# Patient Record
Sex: MALE | Race: WHITE | NOT HISPANIC OR LATINO | Employment: OTHER | URBAN - METROPOLITAN AREA
[De-identification: names, ages, dates, MRNs, and addresses within clinical notes are randomized per-mention and may not be internally consistent; named-entity substitution may affect disease eponyms.]

---

## 2017-10-06 ENCOUNTER — GENERIC CONVERSION - ENCOUNTER (OUTPATIENT)
Dept: OTHER | Facility: OTHER | Age: 52
End: 2017-10-06

## 2017-10-06 DIAGNOSIS — Z00.00 ENCOUNTER FOR GENERAL ADULT MEDICAL EXAMINATION WITHOUT ABNORMAL FINDINGS: ICD-10-CM

## 2017-10-19 ENCOUNTER — GENERIC CONVERSION - ENCOUNTER (OUTPATIENT)
Dept: OTHER | Facility: OTHER | Age: 52
End: 2017-10-19

## 2017-10-24 RX ORDER — OXYCODONE AND ACETAMINOPHEN 10; 325 MG/1; MG/1
1 TABLET ORAL EVERY 4 HOURS PRN
COMMUNITY

## 2017-10-24 NOTE — PRE-PROCEDURE INSTRUCTIONS
No outpatient prescriptions have been marked as taking for the 10/25/17 encounter [facility] Encounter)

## 2017-10-25 ENCOUNTER — ANESTHESIA (OUTPATIENT)
Dept: PERIOP | Facility: HOSPITAL | Age: 52
End: 2017-10-25
Payer: COMMERCIAL

## 2017-10-25 ENCOUNTER — HOSPITAL ENCOUNTER (OUTPATIENT)
Facility: HOSPITAL | Age: 52
Setting detail: OUTPATIENT SURGERY
Discharge: HOME | End: 2017-10-25
Attending: SURGERY | Admitting: SURGERY
Payer: COMMERCIAL

## 2017-10-25 ENCOUNTER — ANESTHESIA EVENT (OUTPATIENT)
Dept: PERIOP | Facility: HOSPITAL | Age: 52
End: 2017-10-25
Payer: COMMERCIAL

## 2017-10-25 VITALS
BODY MASS INDEX: 25.78 KG/M2 | TEMPERATURE: 98 F | RESPIRATION RATE: 17 BRPM | HEART RATE: 59 BPM | OXYGEN SATURATION: 100 % | WEIGHT: 151 LBS | HEIGHT: 64 IN | SYSTOLIC BLOOD PRESSURE: 132 MMHG | DIASTOLIC BLOOD PRESSURE: 71 MMHG

## 2017-10-25 DIAGNOSIS — M79.9 SOFT TISSUE DISORDER: ICD-10-CM

## 2017-10-25 PROBLEM — D17.1 SUBMUSCULAR LIPOMA OF CHEST: Chronic | Status: ACTIVE | Noted: 2017-10-25

## 2017-10-25 PROCEDURE — 88304 TISSUE EXAM BY PATHOLOGIST: CPT | Performed by: SURGERY

## 2017-10-25 RX ORDER — PROPOFOL 10 MG/ML
INJECTION, EMULSION INTRAVENOUS CONTINUOUS PRN
Status: DISCONTINUED | OUTPATIENT
Start: 2017-10-25 | End: 2017-10-25

## 2017-10-25 RX ORDER — CEFAZOLIN SODIUM 1 G/3ML
INJECTION, POWDER, FOR SOLUTION INTRAMUSCULAR; INTRAVENOUS AS NEEDED
Status: DISCONTINUED | OUTPATIENT
Start: 2017-10-25 | End: 2017-10-25 | Stop reason: SURG

## 2017-10-25 RX ORDER — PROMETHAZINE HYDROCHLORIDE 25 MG/ML
25 INJECTION, SOLUTION INTRAMUSCULAR; INTRAVENOUS ONCE AS NEEDED
Status: DISCONTINUED | OUTPATIENT
Start: 2017-10-25 | End: 2017-10-25 | Stop reason: HOSPADM

## 2017-10-25 RX ORDER — FENTANYL CITRATE/PF 50 MCG/ML
50 SYRINGE (ML) INJECTION
Status: DISCONTINUED | OUTPATIENT
Start: 2017-10-25 | End: 2017-10-25

## 2017-10-25 RX ORDER — ACETAMINOPHEN AND CODEINE PHOSPHATE 300; 30 MG/1; MG/1
1 TABLET ORAL EVERY 4 HOURS PRN
Qty: 20 TABLET | Refills: 0 | Status: SHIPPED | OUTPATIENT
Start: 2017-10-25 | End: 2017-11-04

## 2017-10-25 RX ORDER — MAGNESIUM HYDROXIDE 1200 MG/15ML
LIQUID ORAL AS NEEDED
Status: DISCONTINUED | OUTPATIENT
Start: 2017-10-25 | End: 2017-10-25 | Stop reason: HOSPADM

## 2017-10-25 RX ORDER — ONDANSETRON 2 MG/ML
4 INJECTION INTRAMUSCULAR; INTRAVENOUS ONCE AS NEEDED
Status: DISCONTINUED | OUTPATIENT
Start: 2017-10-25 | End: 2017-10-25

## 2017-10-25 RX ORDER — ONDANSETRON 2 MG/ML
4 INJECTION INTRAMUSCULAR; INTRAVENOUS EVERY 6 HOURS PRN
Status: DISCONTINUED | OUTPATIENT
Start: 2017-10-25 | End: 2017-10-25 | Stop reason: HOSPADM

## 2017-10-25 RX ORDER — LIDOCAINE HYDROCHLORIDE 10 MG/ML
INJECTION, SOLUTION INFILTRATION; PERINEURAL AS NEEDED
Status: DISCONTINUED | OUTPATIENT
Start: 2017-10-25 | End: 2017-10-25 | Stop reason: HOSPADM

## 2017-10-25 RX ORDER — MORPHINE SULFATE 2 MG/ML
2 INJECTION, SOLUTION INTRAMUSCULAR; INTRAVENOUS EVERY 2 HOUR PRN
Status: DISCONTINUED | OUTPATIENT
Start: 2017-10-25 | End: 2017-10-25 | Stop reason: HOSPADM

## 2017-10-25 RX ORDER — FENTANYL CITRATE 50 UG/ML
INJECTION, SOLUTION INTRAMUSCULAR; INTRAVENOUS AS NEEDED
Status: DISCONTINUED | OUTPATIENT
Start: 2017-10-25 | End: 2017-10-25 | Stop reason: SURG

## 2017-10-25 RX ORDER — PROPOFOL 10 MG/ML
INJECTION, EMULSION INTRAVENOUS AS NEEDED
Status: DISCONTINUED | OUTPATIENT
Start: 2017-10-25 | End: 2017-10-25 | Stop reason: SURG

## 2017-10-25 RX ORDER — MIDAZOLAM HYDROCHLORIDE 1 MG/ML
INJECTION INTRAMUSCULAR; INTRAVENOUS AS NEEDED
Status: DISCONTINUED | OUTPATIENT
Start: 2017-10-25 | End: 2017-10-25 | Stop reason: SURG

## 2017-10-25 RX ORDER — LIDOCAINE HYDROCHLORIDE 10 MG/ML
INJECTION, SOLUTION INFILTRATION; PERINEURAL AS NEEDED
Status: DISCONTINUED | OUTPATIENT
Start: 2017-10-25 | End: 2017-10-25 | Stop reason: SURG

## 2017-10-25 RX ORDER — MEPERIDINE HYDROCHLORIDE 25 MG/ML
12.5 INJECTION INTRAMUSCULAR; INTRAVENOUS; SUBCUTANEOUS ONCE AS NEEDED
Status: DISCONTINUED | OUTPATIENT
Start: 2017-10-25 | End: 2017-10-25

## 2017-10-25 RX ORDER — SODIUM CHLORIDE, SODIUM LACTATE, POTASSIUM CHLORIDE, CALCIUM CHLORIDE 600; 310; 30; 20 MG/100ML; MG/100ML; MG/100ML; MG/100ML
100 INJECTION, SOLUTION INTRAVENOUS CONTINUOUS
Status: DISCONTINUED | OUTPATIENT
Start: 2017-10-25 | End: 2017-10-25 | Stop reason: HOSPADM

## 2017-10-25 RX ADMIN — FENTANYL CITRATE 50 MCG: 50 INJECTION, SOLUTION INTRAMUSCULAR; INTRAVENOUS at 12:06

## 2017-10-25 RX ADMIN — PROPOFOL 100 MCG/KG/MIN: 10 INJECTION, EMULSION INTRAVENOUS at 12:06

## 2017-10-25 RX ADMIN — MIDAZOLAM HYDROCHLORIDE 2 MG: 1 INJECTION, SOLUTION INTRAMUSCULAR; INTRAVENOUS at 12:00

## 2017-10-25 RX ADMIN — SODIUM CHLORIDE, SODIUM LACTATE, POTASSIUM CHLORIDE, AND CALCIUM CHLORIDE: .6; .31; .03; .02 INJECTION, SOLUTION INTRAVENOUS at 11:53

## 2017-10-25 RX ADMIN — LIDOCAINE HYDROCHLORIDE 50 MG: 10 INJECTION, SOLUTION INFILTRATION; PERINEURAL at 12:06

## 2017-10-25 RX ADMIN — PROPOFOL 20 MG: 10 INJECTION, EMULSION INTRAVENOUS at 12:12

## 2017-10-25 RX ADMIN — PROPOFOL 20 MG: 10 INJECTION, EMULSION INTRAVENOUS at 12:09

## 2017-10-25 RX ADMIN — PROPOFOL 100 MG: 10 INJECTION, EMULSION INTRAVENOUS at 12:06

## 2017-10-25 RX ADMIN — CEFAZOLIN 2000 MG: 1 INJECTION, POWDER, FOR SOLUTION INTRAVENOUS at 12:10

## 2017-10-25 NOTE — ANESTHESIA PREPROCEDURE EVALUATION
Review of Systems/Medical History  Patient summary reviewed        Cardiovascular  Negative cardio ROS    Pulmonary  Smoker cigarette smoker , Tobacco cessation counseling given, ,        GI/Hepatic  Negative GI/hepatic ROS          Negative  ROS        Endo/Other  Negative endo/other ROS      GYN  Negative gynecology ROS          Hematology  Negative hematology ROS      Musculoskeletal  Negative musculoskeletal ROS        Neurology  Negative neurology ROS      Psychology   Negative psychology ROS            Physical Exam    Airway    Mallampati score: II  TM Distance: >3 FB  Neck ROM: full     Dental       Cardiovascular  Comment: Negative ROS, Rhythm: regular, Rate: normal, Cardiovascular exam normal    Pulmonary  Pulmonary exam normal Breath sounds clear to auscultation,     Other Findings        Anesthesia Plan  ASA Score- 2       Anesthesia Type- IV sedation with anesthesia with ASA Monitors  Additional Monitors:   Airway Plan:           Induction- intravenous  Informed Consent- Anesthetic plan and risks discussed with patient

## 2017-10-25 NOTE — OP NOTE
OPERATIVE REPORT  PATIENT NAME: Korina Watkins    :  1965  MRN: [id number]  Pt Location: [facility] OR ROOM 04    SURGERY DATE: 10/25/2017    Surgeon(s) and Role:     * Jesus Sanders MD - Primary     * Yuli Rebolledo PA-C    Preop Diagnosis:  Soft tissue disorder [M79 9]    Post-Op Diagnosis Codes: * Soft tissue disorder [M79 9]    Procedure(s) (LRB):  RIGHT BACK MASS EXCISION (N/A)    Specimen(s):  ID Type Source Tests Collected by Time Destination   1 : RIGHT BACK LIPOMA Tissue Soft Tissue, Lipoma TISSUE EXAM Jesus Sanders MD 10/25/2017 1219        Estimated Blood Loss:   Minimal    Drains:       Anesthesia Type:   IV Sedation with Anesthesia    Operative Indications:  Soft tissue disorder [M79 9]  [de-identified] year male who noted a mass in the right posterior chest for approximately 2 years, increasing in size and constant discomfort and pain with movements  The patient was advised to undergo excision of the soft tissue mass  Operative Findings:  The patient had an lipoma measuring 7 5 x 5 5 cm and was 2 5 cm thick  The lipoma was within the latissimus dorsi muscle  Complications:   None    Procedure and Technique:  The patient was identified he was placed on the left leg decubitus in the operating table  After adequate IV sedation the right chest was prepped and draped in a sterile usual fashion with ChloraPrep  Time-out was called the patient was identified as well as surgical site  1% lidocaine was infiltrated over the mass  A transverse incision was made with a scalpel, taken down through the subcutaneous tissue with cautery  The lipoma was readily identified and dissected bluntly using finger dissection and hemostat  Once the soft tissue mass was completely excised, it was sent to pathology  The muscle was approximated with 3 0 Vicryl in a interrupted fashion    The subcutaneous tissue was approximated with a 3 0 Vicryl in an interrupted fashion and the skin was closed with 4 O Vicryl in a continuous subcuticular fashion  Sterile dressings were applied  At the end of the case instrument, needles, and sponges counts were correct  The patient tolerated the procedure well     I was present for the entire procedure and A qualified resident physician was not available    Patient Disposition:  PACU     SIGNATURE: Dawit Ocampo MD  DATE: October 25, 2017  TIME: 12:35 PM

## 2017-10-25 NOTE — ANESTHESIA POSTPROCEDURE EVALUATION
Post-Op Assessment Note      CV Status:  Stable    Mental Status:  Somnolent    Hydration Status:  Euvolemic    PONV Controlled:  Controlled    Airway Patency:  Patent    Post Op Vitals Reviewed: Yes          Staff: CRNA           /55 (10/25/17 1234)    Temp 97 9 °F (36 6 °C) (10/25/17 1234)    Pulse 74 (10/25/17 1234)   Resp 18 (10/25/17 1234)    SpO2 96 % (10/25/17 1234)

## 2017-10-25 NOTE — DISCHARGE INSTRUCTIONS
Lipoma   GENERAL INFORMATION:   What is a lipoma? A lipoma is a lump made up of fat cells  It is most often found just under your skin on your shoulders, back, or neck, but can be found in other areas of your body  Multiple lipomas found in different areas of your body is called lipomatosis  Lipomas normally grow very slowly and rarely turn into cancer  What increases my risk of a lipoma? The exact cause of a lipoma is not known  Single lipomas are more common in women  Men are more likely to have lipomatosis  The following may increase your risk of getting a lipoma or lipomatosis:  · Family history of lipoma     · Certain medical conditions, such as liver disease, or problems controlling your blood sugar    · Obesity    · A blunt blow or injury to your body  What are the signs and symptoms of a lipoma? Lipomas can occur anywhere in your body and cause signs and symptoms depending on where they occur  Most often, you have a soft, round, movable lump just under your skin  The lump may be painful, but this is not common  How is a lipoma diagnosed? Your caregiver will examine you  He may feel the area around your lipoma  Tell your caregiver about any signs and symptoms you have  You may need any of the following:  · Ultrasound: An ultrasound uses sound waves to show pictures on a monitor  An ultrasound may be done to look at your lipoma and surrounding tissue  · X-ray:  An x-ray may be taken to check for lipomas in your muscles and other areas of your body  · CT scan: This test is also called a CAT scan  An x-ray machine uses a computer to take pictures of your lipoma and nearby tissue  You may be given a dye before the pictures are taken to help caregivers see the pictures better  Tell the caregiver if you have ever had an allergic reaction to contrast dye  · MRI:  This scan uses powerful magnets and a computer to take pictures of your lipoma and nearby tissue   You may be given dye to help the pictures show up better  Tell the caregiver if you have ever had an allergic reaction to contrast dye  Do not enter the MRI room with anything metal  Metal can cause serious injury  Tell the caregiver if you have any metal in or on your body  · Biopsy:  During this procedure, a small amount of tissue is removed from your lipoma  The tissue sample will be sent to a lab for tests  How is a lipoma treated? You may need treatment if your lipoma grows and causes symptoms such as pain  You may choose to have your lipoma treated if you do not like how it looks  Treatment may include any of the following:  · Steroid injections: This is given as a shot into your lipoma to help it shrink  · Liposuction:  During this procedure, your caregiver will use a syringe with a needle to remove your lipoma  He may also insert a scope and tools through a small incision  A scope is a thin, flexible tube with a light and tiny camera on the end  This will help him see and remove your lipoma  · Surgical removal:  Your caregiver will remove your lipoma through an incision in your skin  Anesthesia medicine will be used to numb the surgery site  A drain may be put into your skin to remove extra blood or fluid from your surgery area  The incision may be closed with stitches and a bandage may cover your wound  What are the risks of a lipoma? · Treatment for your lipoma may cause pain, swelling, and bruising  Liposuction may cause dimpling or color changes in your skin  Surgical removal of your lipoma may cause a scar  With surgery, a seroma (pocket of fluid) may form in nearby tissue or organs  Your nerves may be damaged and cause numbness or tingling in your skin  Your muscles may also be injured, and you may bleed more than expected or get an infection  You may need to have more than one treatment for your lipoma  Even with treatment, your lipoma may not be completely removed, and it may increase in size again      · Without treatment, your lipoma may become large and painful  A lipoma in your bowel may block bowel movements  It may also injure nearby tissue causing a hemorrhage (heavy bleeding)  Lipomas in your neck and throat may cause you to choke, have trouble breathing, and be life-threatening  When should I contact my caregiver? Contact your caregiver if:  · You have blood in your bowel movement  · Your lipoma increases in size  · Your lipoma is painful or you have pain in the area of your lipoma  · You have questions or concerns about your condition or care  When should I seek immediate care? Seek care immediately or call 911 if:  · You feel a lump in your throat or have trouble swallowing  · You suddenly have trouble breathing  CARE AGREEMENT:   You have the right to help plan your care  Learn about your health condition and how it may be treated  Discuss treatment options with your caregivers to decide what care you want to receive  You always have the right to refuse treatment  The above information is an  only  It is not intended as medical advice for individual conditions or treatments  Talk to your doctor, nurse or pharmacist before following any medical regimen to see if it is safe and effective for you  © 2014 [address] is for End User's use only and may not be sold, redistributed or otherwise used for commercial purposes  All illustrations and images included in CareNotes® are the copyrighted property of [employer]  or Shashank Ferguson  Lipoma   WHAT YOU SHOULD KNOW:   A lipoma is a lump made up of fat cells  It is most often found just under your skin on your shoulders, back, or neck, but can be found in other areas of your body  Multiple lipomas found in different areas of your body is called lipomatosis  Lipomas normally grow very slowly and rarely turn into cancer     AFTER YOU LEAVE:   Follow up with your primary healthcare provider as directed: If you had your lipoma removed, you may need to return to have your wound checked or stitches removed  Write down your questions so you remember to ask them during your visits  Contact your primary healthcare provider if:   · You have blood in your bowel movement  · Your lipoma increases in size  · Your lipoma is painful or you have pain in the area of your lipoma  · You have questions or concerns about your condition or care  Seek care immediately or call 911 if:   · You feel a lump in your throat or have trouble swallowing  · You suddenly have trouble breathing  © 2014 [address] is for End User's use only and may not be sold, redistributed or otherwise used for commercial purposes  All illustrations and images included in CareNotes® are the copyrighted property of [employer]  or Shashank Ferguson  The above information is an  only  It is not intended as medical advice for individual conditions or treatments  Talk to your doctor, nurse or pharmacist before following any medical regimen to see if it is safe and effective for you    No diet restriction for this surgery  May shower every day  Remove dressings in 3 days  Remove strips in 7 days  Call office to make an appointment in 2 weeks  Call office with any issues regarding the surgery  No driving, heavy lifting or strenuous exercise for one week  Resume home medications  Apply ice to the incisions  May take Percocet, regular Tylenol or ibuprofen for pain

## 2018-01-22 VITALS
SYSTOLIC BLOOD PRESSURE: 118 MMHG | HEIGHT: 63 IN | DIASTOLIC BLOOD PRESSURE: 90 MMHG | TEMPERATURE: 97.2 F | HEART RATE: 87 BPM | WEIGHT: 150 LBS | BODY MASS INDEX: 26.58 KG/M2 | OXYGEN SATURATION: 97 %

## 2018-01-22 VITALS — WEIGHT: 156 LBS | TEMPERATURE: 97.8 F | SYSTOLIC BLOOD PRESSURE: 110 MMHG | DIASTOLIC BLOOD PRESSURE: 70 MMHG

## 2018-02-05 ENCOUNTER — TELEPHONE (OUTPATIENT)
Dept: FAMILY MEDICINE CLINIC | Facility: CLINIC | Age: 53
End: 2018-02-05

## 2018-02-05 NOTE — TELEPHONE ENCOUNTER
Pt was seen for that in early October, why haven't we heard from him until now, also he needs to have his labs done and needs to be seen

## 2018-02-13 ENCOUNTER — OFFICE VISIT (OUTPATIENT)
Dept: FAMILY MEDICINE CLINIC | Facility: CLINIC | Age: 53
End: 2018-02-13
Payer: COMMERCIAL

## 2018-02-13 VITALS
DIASTOLIC BLOOD PRESSURE: 90 MMHG | WEIGHT: 155 LBS | BODY MASS INDEX: 26.46 KG/M2 | SYSTOLIC BLOOD PRESSURE: 124 MMHG | HEART RATE: 81 BPM | HEIGHT: 64 IN | TEMPERATURE: 96.4 F | OXYGEN SATURATION: 99 %

## 2018-02-13 DIAGNOSIS — R03.0 ELEVATED BLOOD PRESSURE READING: ICD-10-CM

## 2018-02-13 DIAGNOSIS — R21 RASH: Primary | ICD-10-CM

## 2018-02-13 DIAGNOSIS — Z12.5 SCREENING FOR PROSTATE CANCER: ICD-10-CM

## 2018-02-13 PROCEDURE — 99214 OFFICE O/P EST MOD 30 MIN: CPT | Performed by: FAMILY MEDICINE

## 2018-02-13 RX ORDER — DIAZEPAM 5 MG/1
TABLET ORAL
Refills: 0 | COMMUNITY
Start: 2018-01-24 | End: 2018-12-07 | Stop reason: SDUPTHER

## 2018-02-13 NOTE — PROGRESS NOTES
Assessment/Plan:    No problem-specific Assessment & Plan notes found for this encounter  Diagnoses and all orders for this visit:    Rash  -     CBC and differential; Future  -     Comprehensive metabolic panel; Future  -     TSH, 3rd generation with T4 reflex; Future  -     nystatin-triamcinolone (MYCOLOG-II) cream; Apply topically 2 (two) times a day for 10 days    Elevated blood pressure reading  -     CBC and differential; Future  -     Comprehensive metabolic panel; Future  -     Lipid panel; Future  -     TSH, 3rd generation with T4 reflex; Future    Screening for prostate cancer  -     PSA; Future    Other orders  -     traMADol-acetaminophen (ULTRACET) 37 5-325 mg per tablet; TAKE 1 - 2 TABLET BY MOUTH EVERY FOUR TO SIX HOURS AS NEEDED FOR PAIN  -     diazepam (VALIUM) 5 mg tablet; TAKE 1-2 TABLET EVERY SIX HOURS          Subjective:      Patient ID: Myah Beltran is a 50 y o  male  Pt states the the nystatin was helping but he ran out of medication, pt failed to have his labs done and failed to follow up      Rash   This is a chronic problem  The current episode started more than 1 month ago  The problem has been gradually improving since onset  Location: "jock area" The rash is characterized by redness  He was exposed to nothing  Pertinent negatives include no fatigue, fever or shortness of breath  Treatments tried: nystatin  The treatment provided moderate relief  There is no history of allergies  The following portions of the patient's history were reviewed and updated as appropriate: allergies, current medications, past family history, past medical history, past social history, past surgical history and problem list     Review of Systems   Constitutional: Negative for chills, fatigue and fever  Respiratory: Negative for shortness of breath and wheezing  Skin: Positive for rash           Objective:    Vitals:    02/13/18 1342   BP: 124/90   Pulse: 81   Temp: (!) 96 4 °F (35 8 °C) SpO2: 99%        Physical Exam   Constitutional: He is oriented to person, place, and time  He appears well-developed and well-nourished  No distress  HENT:   Head: Normocephalic and atraumatic  Neck: Normal range of motion  Neck supple  Cardiovascular: Normal rate, regular rhythm and normal heart sounds  No murmur heard  Pulmonary/Chest: Effort normal and breath sounds normal  No stridor  No respiratory distress  He has no wheezes  He has no rales  Lymphadenopathy:     He has no cervical adenopathy  Neurological: He is alert and oriented to person, place, and time  Skin: Skin is warm and dry  He is not diaphoretic  Psychiatric: He has a normal mood and affect   His behavior is normal  Judgment and thought content normal

## 2018-02-21 ENCOUNTER — APPOINTMENT (OUTPATIENT)
Dept: LAB | Facility: CLINIC | Age: 53
End: 2018-02-21
Payer: COMMERCIAL

## 2018-02-21 DIAGNOSIS — R03.0 ELEVATED BLOOD PRESSURE READING: ICD-10-CM

## 2018-02-21 DIAGNOSIS — R21 RASH: ICD-10-CM

## 2018-02-21 DIAGNOSIS — Z12.5 SCREENING FOR PROSTATE CANCER: ICD-10-CM

## 2018-02-21 LAB
ALBUMIN SERPL BCP-MCNC: 3.8 G/DL (ref 3.5–5)
ALP SERPL-CCNC: 58 U/L (ref 46–116)
ALT SERPL W P-5'-P-CCNC: 25 U/L (ref 12–78)
ANION GAP SERPL CALCULATED.3IONS-SCNC: 9 MMOL/L (ref 4–13)
AST SERPL W P-5'-P-CCNC: 13 U/L (ref 5–45)
BASOPHILS # BLD AUTO: 0.05 THOUSANDS/ΜL (ref 0–0.1)
BASOPHILS NFR BLD AUTO: 1 % (ref 0–1)
BILIRUB SERPL-MCNC: 0.77 MG/DL (ref 0.2–1)
BUN SERPL-MCNC: 14 MG/DL (ref 5–25)
CALCIUM SERPL-MCNC: 8.5 MG/DL (ref 8.3–10.1)
CHLORIDE SERPL-SCNC: 106 MMOL/L (ref 100–108)
CHOLEST SERPL-MCNC: 152 MG/DL (ref 50–200)
CO2 SERPL-SCNC: 24 MMOL/L (ref 21–32)
CREAT SERPL-MCNC: 0.72 MG/DL (ref 0.6–1.3)
EOSINOPHIL # BLD AUTO: 0.35 THOUSAND/ΜL (ref 0–0.61)
EOSINOPHIL NFR BLD AUTO: 6 % (ref 0–6)
ERYTHROCYTE [DISTWIDTH] IN BLOOD BY AUTOMATED COUNT: 12.8 % (ref 11.6–15.1)
GFR SERPL CREATININE-BSD FRML MDRD: 109 ML/MIN/1.73SQ M
GLUCOSE P FAST SERPL-MCNC: 84 MG/DL (ref 65–99)
HCT VFR BLD AUTO: 39 % (ref 36.5–49.3)
HDLC SERPL-MCNC: 77 MG/DL (ref 40–60)
HGB BLD-MCNC: 13.7 G/DL (ref 12–17)
LDLC SERPL CALC-MCNC: 55 MG/DL (ref 0–100)
LYMPHOCYTES # BLD AUTO: 2.22 THOUSANDS/ΜL (ref 0.6–4.47)
LYMPHOCYTES NFR BLD AUTO: 36 % (ref 14–44)
MCH RBC QN AUTO: 31.1 PG (ref 26.8–34.3)
MCHC RBC AUTO-ENTMCNC: 35.1 G/DL (ref 31.4–37.4)
MCV RBC AUTO: 88 FL (ref 82–98)
MONOCYTES # BLD AUTO: 0.56 THOUSAND/ΜL (ref 0.17–1.22)
MONOCYTES NFR BLD AUTO: 9 % (ref 4–12)
NEUTROPHILS # BLD AUTO: 3.02 THOUSANDS/ΜL (ref 1.85–7.62)
NEUTS SEG NFR BLD AUTO: 48 % (ref 43–75)
NRBC BLD AUTO-RTO: 0 /100 WBCS
PLATELET # BLD AUTO: 291 THOUSANDS/UL (ref 149–390)
PMV BLD AUTO: 10.1 FL (ref 8.9–12.7)
POTASSIUM SERPL-SCNC: 4.1 MMOL/L (ref 3.5–5.3)
PROT SERPL-MCNC: 6.9 G/DL (ref 6.4–8.2)
PSA SERPL-MCNC: 0.8 NG/ML (ref 0–4)
RBC # BLD AUTO: 4.41 MILLION/UL (ref 3.88–5.62)
SODIUM SERPL-SCNC: 139 MMOL/L (ref 136–145)
TRIGL SERPL-MCNC: 102 MG/DL
TSH SERPL DL<=0.05 MIU/L-ACNC: 1.27 UIU/ML (ref 0.36–3.74)
WBC # BLD AUTO: 6.22 THOUSAND/UL (ref 4.31–10.16)

## 2018-02-21 PROCEDURE — 84443 ASSAY THYROID STIM HORMONE: CPT

## 2018-02-21 PROCEDURE — 80053 COMPREHEN METABOLIC PANEL: CPT

## 2018-02-21 PROCEDURE — G0103 PSA SCREENING: HCPCS

## 2018-02-21 PROCEDURE — 36415 COLL VENOUS BLD VENIPUNCTURE: CPT

## 2018-02-21 PROCEDURE — 85025 COMPLETE CBC W/AUTO DIFF WBC: CPT

## 2018-02-21 PROCEDURE — 80061 LIPID PANEL: CPT

## 2018-02-27 ENCOUNTER — OFFICE VISIT (OUTPATIENT)
Dept: FAMILY MEDICINE CLINIC | Facility: CLINIC | Age: 53
End: 2018-02-27

## 2018-02-27 VITALS
HEIGHT: 64 IN | DIASTOLIC BLOOD PRESSURE: 78 MMHG | SYSTOLIC BLOOD PRESSURE: 116 MMHG | WEIGHT: 155 LBS | TEMPERATURE: 97.9 F | HEART RATE: 69 BPM | BODY MASS INDEX: 26.46 KG/M2 | OXYGEN SATURATION: 99 %

## 2018-02-27 DIAGNOSIS — R21 RASH: ICD-10-CM

## 2018-02-27 DIAGNOSIS — R03.0 ELEVATED BLOOD PRESSURE READING: Primary | ICD-10-CM

## 2018-02-27 DIAGNOSIS — Z12.5 SCREENING FOR PROSTATE CANCER: ICD-10-CM

## 2018-02-27 DIAGNOSIS — E78.89 ELEVATED HDL: ICD-10-CM

## 2018-02-27 DIAGNOSIS — B00.1 COLD SORE: ICD-10-CM

## 2018-02-27 PROCEDURE — 99214 OFFICE O/P EST MOD 30 MIN: CPT | Performed by: FAMILY MEDICINE

## 2018-02-27 RX ORDER — VALACYCLOVIR HYDROCHLORIDE 1 G/1
1000 TABLET, FILM COATED ORAL 2 TIMES DAILY
Qty: 10 TABLET | Refills: 0 | Status: SHIPPED | OUTPATIENT
Start: 2018-02-27 | End: 2022-02-23

## 2018-02-27 NOTE — PROGRESS NOTES
Assessment/Plan:    No problem-specific Assessment & Plan notes found for this encounter  Diagnoses and all orders for this visit:    Elevated blood pressure reading  -     CBC and differential; Future  -     Comprehensive metabolic panel; Future  -     Lipid panel; Future  -     TSH, 3rd generation with T4 reflex; Future    Elevated HDL  Comments:  pt has a very good lipid profile, continue to watch diet and exercise  Orders:  -     CBC and differential; Future  -     Comprehensive metabolic panel; Future  -     Lipid panel; Future  -     TSH, 3rd generation with T4 reflex; Future    Rash  Comments:  resolved    Cold sore  -     valACYclovir (VALTREX) 1,000 mg tablet; Take 1 tablet (1,000 mg total) by mouth 2 (two) times a day for 5 days    Screening for prostate cancer  -     PSA; Future          Subjective:      Patient ID: Makenzie Nunez is a 50 y o  male  Follow up for elevated Bp, pt quit smoking totally at this point, pt is not checking Bps at home, follow up for rash which is improved with the since using the steroid cream, pt complains of cold sore today which started recently        The following portions of the patient's history were reviewed and updated as appropriate: allergies, current medications, past family history, past medical history, past social history, past surgical history and problem list     Review of Systems   Cardiovascular: Negative for chest pain and palpitations  Gastrointestinal: Negative for abdominal pain, nausea and vomiting  Musculoskeletal: Negative for myalgias  Objective:      /78   Pulse 69   Temp 97 9 °F (36 6 °C)   Ht 5' 4" (1 626 m)   Wt 70 3 kg (155 lb)   SpO2 99%   BMI 26 61 kg/m²          Physical Exam   Constitutional: He is oriented to person, place, and time  He appears well-developed and well-nourished  No distress  HENT:   Head: Normocephalic and atraumatic  Eyes: No scleral icterus  Neck: Normal range of motion  Neck supple  Cardiovascular: Normal rate, regular rhythm and normal heart sounds  Exam reveals no gallop and no friction rub  No murmur heard  Pulmonary/Chest: Effort normal and breath sounds normal  No stridor  No respiratory distress  He has no wheezes  He has no rales  Abdominal: Soft  Bowel sounds are normal  He exhibits no distension and no mass  There is no tenderness  There is no rebound and no guarding  Lymphadenopathy:     He has no cervical adenopathy  Neurological: He is alert and oriented to person, place, and time  Skin: Skin is warm and dry  He is not diaphoretic  Psychiatric: He has a normal mood and affect  His behavior is normal  Judgment and thought content normal    Nursing note and vitals reviewed

## 2018-05-24 DIAGNOSIS — R21 RASH: ICD-10-CM

## 2018-06-19 DIAGNOSIS — R21 RASH: ICD-10-CM

## 2018-07-03 ENCOUNTER — OFFICE VISIT (OUTPATIENT)
Dept: FAMILY MEDICINE CLINIC | Facility: CLINIC | Age: 53
End: 2018-07-03
Payer: COMMERCIAL

## 2018-07-03 VITALS
BODY MASS INDEX: 26.63 KG/M2 | WEIGHT: 156 LBS | HEIGHT: 64 IN | SYSTOLIC BLOOD PRESSURE: 132 MMHG | DIASTOLIC BLOOD PRESSURE: 98 MMHG | TEMPERATURE: 97.1 F

## 2018-07-03 DIAGNOSIS — G89.29 CHRONIC BILATERAL LOW BACK PAIN WITH BILATERAL SCIATICA: ICD-10-CM

## 2018-07-03 DIAGNOSIS — Z12.11 SCREENING FOR COLON CANCER: ICD-10-CM

## 2018-07-03 DIAGNOSIS — M54.41 CHRONIC BILATERAL LOW BACK PAIN WITH BILATERAL SCIATICA: ICD-10-CM

## 2018-07-03 DIAGNOSIS — H92.01 EAR PAIN, RIGHT: Primary | ICD-10-CM

## 2018-07-03 DIAGNOSIS — F41.9 ANXIETY: ICD-10-CM

## 2018-07-03 DIAGNOSIS — M54.42 CHRONIC BILATERAL LOW BACK PAIN WITH BILATERAL SCIATICA: ICD-10-CM

## 2018-07-03 DIAGNOSIS — H61.23 BILATERAL HEARING LOSS DUE TO CERUMEN IMPACTION: ICD-10-CM

## 2018-07-03 PROBLEM — M54.9 BACK PAIN: Status: ACTIVE | Noted: 2018-07-03

## 2018-07-03 PROBLEM — R21 SKIN RASH: Status: ACTIVE | Noted: 2017-10-06

## 2018-07-03 PROBLEM — L72.9 CYST OF SKIN: Status: ACTIVE | Noted: 2017-10-06

## 2018-07-03 PROBLEM — M79.89 SOFT TISSUE MASS: Status: ACTIVE | Noted: 2017-10-19

## 2018-07-03 PROCEDURE — 99214 OFFICE O/P EST MOD 30 MIN: CPT | Performed by: NURSE PRACTITIONER

## 2018-07-03 RX ORDER — CYCLOBENZAPRINE HCL 10 MG
10 TABLET ORAL 3 TIMES DAILY PRN
Qty: 30 TABLET | Refills: 2 | Status: SHIPPED | OUTPATIENT
Start: 2018-07-03

## 2018-07-03 RX ORDER — NYSTATIN 100000 U/G
CREAM TOPICAL
Refills: 1 | COMMUNITY
Start: 2018-05-24 | End: 2021-01-13 | Stop reason: SDUPTHER

## 2018-07-03 RX ORDER — PREDNISONE 20 MG/1
TABLET ORAL
Qty: 16 TABLET | Refills: 0 | Status: SHIPPED | OUTPATIENT
Start: 2018-07-03

## 2018-07-03 RX ORDER — DIAZEPAM 5 MG/1
5 TABLET ORAL EVERY 6 HOURS PRN
Qty: 30 TABLET | Refills: 0 | Status: SHIPPED | OUTPATIENT
Start: 2018-07-03 | End: 2018-11-09 | Stop reason: SDUPTHER

## 2018-07-03 NOTE — PROGRESS NOTES
Assessment/Plan:    No problem-specific Assessment & Plan notes found for this encounter  Diagnoses and all orders for this visit:    Ear pain, right  Comments:  bilateral cerumen impaction noted- pt reports bilateral hearing loss    Screening for colon cancer  Comments:  Referred to GI  Orders:  -     Ambulatory referral to Gastroenterology; Future    Anxiety  Comments:  Rx for Valium provided  Orders:  -     diazepam (VALIUM) 5 mg tablet; Take 1 tablet (5 mg total) by mouth every 6 (six) hours as needed for anxiety    Bilateral hearing loss due to cerumen impaction  Comments:  Pt will return 3 days for cerumen removal    Chronic bilateral low back pain with bilateral sciatica  Comments:  Rx for Prednisone and Flexeril provided  pt has hx of lumbar surgery 2 yrs ago  Orders:  -     predniSONE 20 mg tablet; Please take 3 -20 mg tabs X3 days , Please take 2 - 20mg tabs X 3 days, Please take 1-20 mg tab X1 days  -     cyclobenzaprine (FLEXERIL) 10 mg tablet; Take 1 tablet (10 mg total) by mouth 3 (three) times a day as needed for muscle spasms    Other orders  -     nystatin (MYCOSTATIN) cream; APPLY TOPICALLY 2 (TWO) TIMES A DAY FOR 10 DAYS          Subjective:      Patient ID: Parul Del Rio is a 50 y o  male here for ear pain  HPI    The following portions of the patient's history were reviewed and updated as appropriate:   He  has a past medical history of Back pain  He   Patient Active Problem List    Diagnosis Date Noted    Ear pain, right 07/03/2018    Back pain 07/03/2018    Submuscular lipoma of chest 10/25/2017    Soft tissue mass 10/19/2017    Cyst of skin 10/06/2017    Skin rash 10/06/2017     He  has a past surgical history that includes Back surgery (09/2017); Neck surgery; Hernia repair; and Mass excision (N/A, 10/25/2017)  His family history is not on file  He  reports that he has been smoking Cigarettes  He has been smoking about 0 10 packs per day   He has never used smokeless tobacco  He reports that he drinks alcohol  He reports that he does not use drugs  Current Outpatient Prescriptions   Medication Sig Dispense Refill    nystatin (MYCOSTATIN) cream APPLY TOPICALLY 2 (TWO) TIMES A DAY FOR 10 DAYS  1    cyclobenzaprine (FLEXERIL) 10 mg tablet Take 1 tablet (10 mg total) by mouth 3 (three) times a day as needed for muscle spasms 30 tablet 2    diazepam (VALIUM) 5 mg tablet TAKE 1-2 TABLET EVERY SIX HOURS  0    diazepam (VALIUM) 5 mg tablet Take 1 tablet (5 mg total) by mouth every 6 (six) hours as needed for anxiety 30 tablet 0    nystatin-triamcinolone (MYCOLOG-II) cream Apply topically 2 (two) times a day for 10 days 120 g 1    oxyCODONE-acetaminophen (PERCOCET)  mg per tablet Take 1 tablet by mouth every 4 (four) hours as needed for moderate pain      predniSONE 20 mg tablet Please take 3 -20 mg tabs X3 days , Please take 2 - 20mg tabs X 3 days, Please take 1-20 mg tab X1 days 16 tablet 0    valACYclovir (VALTREX) 1,000 mg tablet Take 1 tablet (1,000 mg total) by mouth 2 (two) times a day for 5 days 10 tablet 0     No current facility-administered medications for this visit  Current Outpatient Prescriptions on File Prior to Visit   Medication Sig    diazepam (VALIUM) 5 mg tablet TAKE 1-2 TABLET EVERY SIX HOURS    nystatin-triamcinolone (MYCOLOG-II) cream Apply topically 2 (two) times a day for 10 days    oxyCODONE-acetaminophen (PERCOCET)  mg per tablet Take 1 tablet by mouth every 4 (four) hours as needed for moderate pain    valACYclovir (VALTREX) 1,000 mg tablet Take 1 tablet (1,000 mg total) by mouth 2 (two) times a day for 5 days    [DISCONTINUED] traMADol-acetaminophen (ULTRACET) 37 5-325 mg per tablet TAKE 1 - 2 TABLET BY MOUTH EVERY FOUR TO SIX HOURS AS NEEDED FOR PAIN     No current facility-administered medications on file prior to visit  He has No Known Allergies       Review of Systems   Constitutional: Negative    Negative for appetite change and fatigue  HENT: Positive for ear pain and hearing loss  Negative for congestion, rhinorrhea and sinus pain  Eyes: Negative  Negative for pain, itching and visual disturbance  Respiratory: Negative  Negative for cough, shortness of breath and wheezing  Cardiovascular: Negative  Negative for chest pain, palpitations and leg swelling  Gastrointestinal: Negative  Negative for abdominal pain, constipation, diarrhea, nausea and vomiting  Endocrine: Negative  Negative for polydipsia, polyphagia and polyuria  Genitourinary: Negative  Negative for difficulty urinating, frequency and urgency  Musculoskeletal: Positive for back pain and gait problem  Negative for joint swelling  Pt repots low back pain and bilateral leg pain X1 wk   Skin: Negative  Negative for rash and wound  Allergic/Immunologic: Negative  Neurological: Negative for dizziness, weakness and headaches  Hematological: Negative  Negative for adenopathy  Does not bruise/bleed easily  Psychiatric/Behavioral: Negative for behavioral problems and sleep disturbance  The patient is nervous/anxious  Objective:      /98   Temp (!) 97 1 °F (36 2 °C)   Ht 5' 4" (1 626 m)   Wt 70 8 kg (156 lb)   BMI 26 78 kg/m²          Physical Exam   Constitutional: He is oriented to person, place, and time  He appears well-developed and well-nourished  He appears distressed  HENT:   Head: Normocephalic  Right Ear: Decreased hearing is noted  Left Ear: Decreased hearing is noted  Large amount cerumen noted in both ears   Eyes: Conjunctivae and EOM are normal  Pupils are equal, round, and reactive to light  Neck: Normal range of motion  Neck supple  Cardiovascular: Normal rate and regular rhythm  Pulmonary/Chest: Effort normal and breath sounds normal    Abdominal: Soft  Bowel sounds are normal    Musculoskeletal:        Lumbar back: He exhibits decreased range of motion, tenderness, pain and spasm  Arms:  Bilateral lower back tenderness noted, n/v intact   Neurological: He is alert and oriented to person, place, and time  Skin: Skin is warm and dry  Psychiatric: He has a normal mood and affect   His behavior is normal  Judgment and thought content normal

## 2018-07-06 ENCOUNTER — OFFICE VISIT (OUTPATIENT)
Dept: FAMILY MEDICINE CLINIC | Facility: CLINIC | Age: 53
End: 2018-07-06
Payer: COMMERCIAL

## 2018-07-06 VITALS
OXYGEN SATURATION: 97 % | SYSTOLIC BLOOD PRESSURE: 138 MMHG | WEIGHT: 156 LBS | DIASTOLIC BLOOD PRESSURE: 90 MMHG | HEART RATE: 77 BPM | HEIGHT: 64 IN | BODY MASS INDEX: 26.63 KG/M2 | TEMPERATURE: 96.8 F

## 2018-07-06 DIAGNOSIS — H92.01 EAR PAIN, RIGHT: Primary | ICD-10-CM

## 2018-07-06 DIAGNOSIS — H61.23 BILATERAL HEARING LOSS DUE TO CERUMEN IMPACTION: ICD-10-CM

## 2018-07-06 PROCEDURE — 99213 OFFICE O/P EST LOW 20 MIN: CPT | Performed by: NURSE PRACTITIONER

## 2018-07-06 PROCEDURE — 3008F BODY MASS INDEX DOCD: CPT | Performed by: NURSE PRACTITIONER

## 2018-07-06 PROCEDURE — 69209 REMOVE IMPACTED EAR WAX UNI: CPT | Performed by: NURSE PRACTITIONER

## 2018-07-06 NOTE — PROGRESS NOTES
Assessment/Plan:    No problem-specific Assessment & Plan notes found for this encounter  Diagnoses and all orders for this visit:    Ear pain, right  Comments:  Pain is resolved post Cerumen removal  Orders:  -     Ear cerumen removal    Bilateral hearing loss due to cerumen impaction  Comments:  pt able to hear without difficulty following bilateral cerumen removal  Orders:  -     Ear cerumen removal          Subjective:      Patient ID: Yvette Ram is a 50 y o  male here for bilateral cerumen impaction, right ear pain and bilateral hearing loss    HPI    The following portions of the patient's history were reviewed and updated as appropriate:   He  has a past medical history of Back pain  He   Patient Active Problem List    Diagnosis Date Noted    Bilateral hearing loss due to cerumen impaction 07/06/2018    Ear pain, right 07/03/2018    Back pain 07/03/2018    Submuscular lipoma of chest 10/25/2017    Soft tissue mass 10/19/2017    Cyst of skin 10/06/2017    Skin rash 10/06/2017     He  has a past surgical history that includes Back surgery (09/2017); Neck surgery; Hernia repair; and Mass excision (N/A, 10/25/2017)  His family history is not on file  He  reports that he has been smoking Cigarettes  He has been smoking about 0 10 packs per day  He has never used smokeless tobacco  He reports that he drinks alcohol  He reports that he does not use drugs    Current Outpatient Prescriptions   Medication Sig Dispense Refill    cyclobenzaprine (FLEXERIL) 10 mg tablet Take 1 tablet (10 mg total) by mouth 3 (three) times a day as needed for muscle spasms 30 tablet 2    diazepam (VALIUM) 5 mg tablet TAKE 1-2 TABLET EVERY SIX HOURS  0    diazepam (VALIUM) 5 mg tablet Take 1 tablet (5 mg total) by mouth every 6 (six) hours as needed for anxiety 30 tablet 0    nystatin (MYCOSTATIN) cream APPLY TOPICALLY 2 (TWO) TIMES A DAY FOR 10 DAYS  1    oxyCODONE-acetaminophen (PERCOCET)  mg per tablet Take 1 tablet by mouth every 4 (four) hours as needed for moderate pain      predniSONE 20 mg tablet Please take 3 -20 mg tabs X3 days , Please take 2 - 20mg tabs X 3 days, Please take 1-20 mg tab X1 days 16 tablet 0    nystatin-triamcinolone (MYCOLOG-II) cream Apply topically 2 (two) times a day for 10 days 120 g 1    valACYclovir (VALTREX) 1,000 mg tablet Take 1 tablet (1,000 mg total) by mouth 2 (two) times a day for 5 days 10 tablet 0     No current facility-administered medications for this visit  Current Outpatient Prescriptions on File Prior to Visit   Medication Sig    cyclobenzaprine (FLEXERIL) 10 mg tablet Take 1 tablet (10 mg total) by mouth 3 (three) times a day as needed for muscle spasms    diazepam (VALIUM) 5 mg tablet TAKE 1-2 TABLET EVERY SIX HOURS    diazepam (VALIUM) 5 mg tablet Take 1 tablet (5 mg total) by mouth every 6 (six) hours as needed for anxiety    nystatin (MYCOSTATIN) cream APPLY TOPICALLY 2 (TWO) TIMES A DAY FOR 10 DAYS    oxyCODONE-acetaminophen (PERCOCET)  mg per tablet Take 1 tablet by mouth every 4 (four) hours as needed for moderate pain    predniSONE 20 mg tablet Please take 3 -20 mg tabs X3 days , Please take 2 - 20mg tabs X 3 days, Please take 1-20 mg tab X1 days    nystatin-triamcinolone (MYCOLOG-II) cream Apply topically 2 (two) times a day for 10 days    valACYclovir (VALTREX) 1,000 mg tablet Take 1 tablet (1,000 mg total) by mouth 2 (two) times a day for 5 days     No current facility-administered medications on file prior to visit  He has No Known Allergies       Review of Systems   Constitutional: Negative  Negative for appetite change and fatigue  HENT: Positive for ear pain and hearing loss  Negative for congestion, rhinorrhea and sinus pain  Eyes: Negative  Negative for pain, itching and visual disturbance  Respiratory: Negative  Negative for cough, shortness of breath and wheezing  Cardiovascular: Negative    Negative for chest pain, palpitations and leg swelling  Gastrointestinal: Negative  Negative for abdominal pain, constipation, diarrhea, nausea and vomiting  Endocrine: Negative  Negative for polydipsia, polyphagia and polyuria  Genitourinary: Negative  Negative for difficulty urinating, frequency and urgency  Musculoskeletal: Negative  Negative for back pain and joint swelling  Skin: Negative  Negative for rash and wound  Allergic/Immunologic: Negative  Neurological: Negative  Negative for dizziness, weakness and headaches  Hematological: Negative  Negative for adenopathy  Does not bruise/bleed easily  Psychiatric/Behavioral: Negative  Negative for behavioral problems and sleep disturbance  The patient is not nervous/anxious  Objective:      /90 (BP Location: Right arm, Patient Position: Sitting, Cuff Size: Adult)   Pulse 77   Temp (!) 96 8 °F (36 °C) (Tympanic)   Ht 5' 4" (1 626 m)   Wt 70 8 kg (156 lb)   SpO2 97%   BMI 26 78 kg/m²          Physical Exam   Constitutional: He is oriented to person, place, and time  He appears well-developed and well-nourished  HENT:   Head: Normocephalic  Right Ear: Decreased hearing is noted  Left Ear: Decreased hearing is noted  Cerumen impaction bilaterally   Eyes: Conjunctivae and EOM are normal  Pupils are equal, round, and reactive to light  Neck: Normal range of motion  Neck supple  Cardiovascular: Normal rate and regular rhythm  Pulmonary/Chest: Effort normal and breath sounds normal    Abdominal: Soft  Bowel sounds are normal    Musculoskeletal: Normal range of motion  Neurological: He is alert and oriented to person, place, and time  Skin: Skin is warm and dry  Psychiatric: He has a normal mood and affect   His behavior is normal  Judgment and thought content normal          Ear cerumen removal  Date/Time: 7/6/2018 9:43 AM  Performed by: Chikis Escalante by: Alfredo Rosenberg     Patient location: Clinic  Indications / Diagnosis:  Large amount bilateral cerumen impaction,, hearing loss, ear pain  Consent:     Consent obtained:  Verbal    Consent given by:  Patient    Risks discussed:  Pain, incomplete removal and dizziness    Alternatives discussed:  No treatment  Universal protocol:     Procedure explained and questions answered to patient or proxy's satisfaction: yes      Patient identity confirmed:  Verbally with patient  Procedure details:     Local anesthetic:  None    Location:  L ear and R ear    Procedure type: irrigation      Approach:  External    Equipment used:  Currette and irrigation  Post-procedure details:     Complication:  None    Hearing quality:  Normal    Patient tolerance of procedure:   Tolerated well, no immediate complications

## 2018-07-11 ENCOUNTER — EVALUATION (OUTPATIENT)
Dept: PHYSICAL THERAPY | Facility: CLINIC | Age: 53
End: 2018-07-11
Payer: OTHER MISCELLANEOUS

## 2018-07-11 DIAGNOSIS — M51.26 LUMBAR DISC HERNIATION: ICD-10-CM

## 2018-07-11 DIAGNOSIS — M50.20 CERVICAL DISC HERNIATION: Primary | ICD-10-CM

## 2018-07-11 PROCEDURE — 97112 NEUROMUSCULAR REEDUCATION: CPT | Performed by: PHYSICAL THERAPIST

## 2018-07-11 PROCEDURE — 97163 PT EVAL HIGH COMPLEX 45 MIN: CPT | Performed by: PHYSICAL THERAPIST

## 2018-07-11 PROCEDURE — G8991 OTHER PT/OT GOAL STATUS: HCPCS | Performed by: PHYSICAL THERAPIST

## 2018-07-11 PROCEDURE — G8990 OTHER PT/OT CURRENT STATUS: HCPCS | Performed by: PHYSICAL THERAPIST

## 2018-07-11 PROCEDURE — 97110 THERAPEUTIC EXERCISES: CPT | Performed by: PHYSICAL THERAPIST

## 2018-07-11 NOTE — PROGRESS NOTES
PT Evaluation     Today's date: 2018  Patient name: Mare Chicas  : 1965  MRN: [id number]  Referring provider: Dewaine Goodell, MD  Dx:   Encounter Diagnosis     ICD-10-CM    1  Cervical disc herniation M50 20    2  Lumbar disc herniation M51 26                   Assessment  Impairments: abnormal or restricted ROM, activity intolerance, impaired physical strength, pain with function, poor posture  and poor body mechanics  Functional limitations: Pain with prolonged standing and walking  Assessment details: Patient is a 50 y o  Male s/p both cervical and lumbar fusions 14 and 18 months ago  He reports stiffness in his neck, but severe pain in his lower back and radiculopathy into his right LE  Pain is constant and limits patient's ability to function at home and community  Patient demonstrates decreased cervical and lumbar ROM, poor posture, and decrease core muscle strength  He will benefit from trial of physical therapy to see if symptoms can be decreased in order to promote a more active lifestyle  Understanding of Dx/Px/POC: fair   Prognosis: fair  Prognosis details: Chronic pain unchanged since surgery and previous trial of physical therapy    Goals  STG (4 weeks)  1  Patient will report pain as a 5-6/10 at worst with ADLs  2  Patient will demonstrate improved postural awareness to minimize strain on lower back  3  Patient will demonstrate cervical ROM to [facility]  LT (8 weeks)  1  Patient will report pain as a 3-4/10 at worst with normal activities  2  Patient will report the ability to ambulate community distances without limitations due to pain  3   Patient will demonstrate core strength of 4-4+/5 in order to promote lumbar stabilization    Plan  Patient would benefit from: skilled physical therapy  Planned modality interventions: electrical stimulation/Russian stimulation and thermotherapy: hydrocollator packs  Planned therapy interventions: abdominal trunk stabilization, home exercise program, therapeutic exercise, therapeutic activities, stretching, strengthening, postural training, patient education, neuromuscular re-education and manual therapy  Frequency: 2x week  Duration in weeks: 8  Plan of Care beginning date: 2018  Plan of Care expiration date: 2018  Treatment plan discussed with: patient        Subjective Evaluation    History of Present Illness  Mechanism of injury: Patient states he was pumping concrete and a pump came off of the wall and hit him in the back of the head  He states he had a CT scan and was told there was nothing wrong  He went to the hospital  Two months later, a 4x4 fell and hit him in the head  He went to the hospital and was told there was nothing wrong  Patient took a couple of days off and went to the ER due to right LE and neck pain  Patient underwent a cervical fusion 18 months ago and a lumbar fusion 14 months ago  He had some physical therapy in [place] about a year ago  He still has significant pain, so he is referred now to outpatient PT services  Recurrent probem    Quality of life: fair    Pain  Current pain ratin  At best pain ratin  At worst pain ratin  Location: Right lower back and entire right LE  Stiffness left UT  Quality: pulling and tight  Progression: improved    Social Support  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)  4  Lives in: [facility]    Employment status: not working (On disability)  Exercise history: Walking around the block  Tries to ride his bicycle      Diagnostic Tests    FCE comments: Doesn't have pain in neck  Reports more tightness in left UTTreatments  Previous treatment: physical therapy  Current treatment: medication  Patient Goals  Patient goal: To get better so he can go back to work        Objective     Special Questions  Negative for night pain, disturbed sleep, dizziness, faints, bladder dysfunction, bowel dysfunction and saddle (S4) numbness    Static Posture     Lumbar Spine   Flattened  Knee   Knee (Left): Flexed  Knee (Right): Flexed  Comments  Wide HEAVEN    Postural Observations  Seated posture: poor  Standing posture: poor  Correction of posture: has no consistent effect        Palpation   Left   No palpable tenderness to the erector spinae and lumbar paraspinals  Tenderness of the upper trapezius  Right   No palpable tenderness to the erector spinae and lumbar paraspinals  Neurological Testing     Sensation   Cervical/Thoracic   Left   Intact: light touch    Right   Intact: light touch    Lumbar     Right   Diminished: light touch    Comments   Right light touch: entire right LE    Reflexes   Left   Biceps (C5/C6): normal (2+)  Brachioradialis (C6): normal (2+)  Triceps (C7): normal (2+)    Right   Biceps (C5/C6): normal (2+)  Brachioradialis (C6): normal (2+)  Triceps (C7): normal (2+)    Active Range of Motion   Cervical/Thoracic Spine   Cervical    Flexion: 40 degrees   Extension: 45 degrees   Left lateral flexion: 40 degrees   Right lateral flexion: 30 degrees   Left rotation: 75 degrees   Right rotation: 50 degrees     Additional Active Range of Motion Details  50% limited AROM all directions  Painful with flexion and extension    Strength/Myotome Testing     Lumbar   Left   Normal strength    Right   Normal strength    Tests   Cervical   Negative repeated extension and repeated flexion  Thoracic   Positive slump  Lumbar   Positive slumped  Left   Negative crossed SLR and passive SLR  Right   Positive passive SLR  Negative crossed SLR         Flowsheet Rows      Most Recent Value   PT/OT G-Codes   Current Score  l-spine 26  C-spine 37   Projected Score  L-spine 37 C-spine 44   FOTO information reviewed  Yes   Assessment Type  Evaluation   G code set  Other PT/OT Primary   Other PT Primary Current Status ()  CL   Other PT Primary Goal Status ()  CL          Precautions None    Specialty Daily Treatment Diary     Manual Exercise Diary  7/11       Postural ed JF       Cervical AROM Instructed       Bilateral UT stretch 3x30"       Webslide rows H,M,L        Cervical retractions        Bilateral Levator scap stretch        Seated lower back stretch        LTR supine        Right piriformis stretch        Slump sliders right        PPT with march        Alternating UE/LE raises supine        Prone UE raises        Prone LE raises                                                            Modalities

## 2018-07-16 ENCOUNTER — OFFICE VISIT (OUTPATIENT)
Dept: PHYSICAL THERAPY | Facility: CLINIC | Age: 53
End: 2018-07-16
Payer: OTHER MISCELLANEOUS

## 2018-07-16 DIAGNOSIS — M51.26 LUMBAR DISC HERNIATION: ICD-10-CM

## 2018-07-16 DIAGNOSIS — M50.20 CERVICAL DISC HERNIATION: Primary | ICD-10-CM

## 2018-07-16 PROCEDURE — 97112 NEUROMUSCULAR REEDUCATION: CPT

## 2018-07-16 PROCEDURE — 97110 THERAPEUTIC EXERCISES: CPT

## 2018-07-16 PROCEDURE — 97530 THERAPEUTIC ACTIVITIES: CPT

## 2018-07-16 NOTE — PROGRESS NOTES
Daily Note     Today's date: 2018  Patient name: Kristine Pepe  : 1965  MRN: [id number]  Referring provider: Palmer Walsh MD  Dx:   Encounter Diagnosis     ICD-10-CM    1  Cervical disc herniation M50 20    2  Lumbar disc herniation M51 26                   Subjective: Pt c/o 710 pain in LB today  He states that he was swimming yesterday and his R shoulder is sore today  Objective: See treatment diary below  Manual                                                                                          Exercise Diary           Postural ed JF  SA         Cervical AROM Instructed  15x         Bilateral UT stretch 3x30"  30" 3x         Webslide rows H,M,L             Cervical retractions    2x10         Bilateral Levator scap stretch    30" 3x         Seated lower back stretch    30" 3x         LTR supine    10" 10x         Right piriformis stretch    30" 3x         Slump sliders right    3" 15x         PPT with march    30x         Alternating UE/LE raises supine             Prone UE raises             Prone LE raises              PPT    5" 30x          UBE standing    5'f/5'b                                                                       Modalities                                                             Assessment: Pt had no pain post Kayla today  He noted feeling completely different leaving than when had arrived  He reported feeling much more loose  He was educated on HEP including stretches and correcting postural alignment to decrease pain outside of therapy  Plan: Progress treatment as tolerated

## 2018-07-18 ENCOUNTER — OFFICE VISIT (OUTPATIENT)
Dept: PHYSICAL THERAPY | Facility: CLINIC | Age: 53
End: 2018-07-18
Payer: OTHER MISCELLANEOUS

## 2018-07-18 DIAGNOSIS — M50.20 CERVICAL DISC HERNIATION: Primary | ICD-10-CM

## 2018-07-18 DIAGNOSIS — M51.26 LUMBAR DISC HERNIATION: ICD-10-CM

## 2018-07-18 PROCEDURE — 97530 THERAPEUTIC ACTIVITIES: CPT

## 2018-07-18 PROCEDURE — 97110 THERAPEUTIC EXERCISES: CPT

## 2018-07-18 PROCEDURE — 97112 NEUROMUSCULAR REEDUCATION: CPT

## 2018-07-18 NOTE — PROGRESS NOTES
Daily Note     Today's date: 2018  Patient name: Kilo Nugent  : 1965  MRN: [id number]  Referring provider: Marva Oseguera MD  Dx:   Encounter Diagnosis     ICD-10-CM    1  Cervical disc herniation M50 20    2  Lumbar disc herniation M51 26                   Subjective: Pt denies any pain upon arrival today  He states that he is stiff but it is ms stiffness  Objective: See treatment diary below  Manual                                                                                          Exercise Diary         Postural ed JF  SA  SA       Cervical AROM Instructed  15x  15x       Bilateral UT stretch 3x30"  30" 3x  30" 3x       Webslide rows H,M,L      GTB 20x       Cervical retractions    2x10  2x10       Bilateral Levator scap stretch    30" 3x  30" 3x       Seated lower back stretch    30" 3x  30" 3x       LTR supine    10" 10x  10" 10x       Right piriformis stretch    30" 3x  30" 3x       Slump sliders right    3" 15x  3" 15x       PPT with march    30x  30x       Alternating UE/LE raises supine             Prone UE raises             Prone LE raises              PPT    5" 30x  5" 30x        UBE standing    5'f/5'b  5'f/5'b                                                                     Modalities                                                              Assessment: pt had no pain throughout nor post treatment today  He noted feeling much better post Kayla and stretches  He was educated on ms soreness vs pain and was advised to continue with HEP to decrease pain outside of therapy  Plan: Progress treatment as tolerated

## 2018-07-20 ENCOUNTER — OFFICE VISIT (OUTPATIENT)
Dept: PHYSICAL THERAPY | Facility: CLINIC | Age: 53
End: 2018-07-20
Payer: OTHER MISCELLANEOUS

## 2018-07-20 DIAGNOSIS — M50.20 CERVICAL DISC HERNIATION: Primary | ICD-10-CM

## 2018-07-20 DIAGNOSIS — M51.26 LUMBAR DISC HERNIATION: ICD-10-CM

## 2018-07-20 PROCEDURE — 97112 NEUROMUSCULAR REEDUCATION: CPT

## 2018-07-20 PROCEDURE — 97110 THERAPEUTIC EXERCISES: CPT

## 2018-07-20 PROCEDURE — 97530 THERAPEUTIC ACTIVITIES: CPT

## 2018-07-20 NOTE — PROGRESS NOTES
Daily Note     Today's date: 2018  Patient name: Nj Stevenson  : 1965  MRN: [id number]  Referring provider: Jerry Rizo MD  Dx:   Encounter Diagnosis     ICD-10-CM    1  Cervical disc herniation M50 20    2  Lumbar disc herniation M51 26                   Subjective: Pt c/o 5/10 pain in his LB today  He states that he was mowing the lawn yesterday and feels as if he over did it  Objective: See treatment diary below  Manual                                                                                          Exercise Diary       Postural ed JF  SA  SA  SA     Cervical AROM Instructed  15x  15x  15x     Bilateral UT stretch 3x30"  30" 3x  30" 3x  30" 3x     Webslide rows H,M,L      GTB 20x  GTB 20x     Cervical retractions    2x10  2x10  2x10     Bilateral Levator scap stretch    30" 3x  30" 3x  30" 3x     Seated lower back stretch    30" 3x  30" 3x  30" 3x     LTR supine    10" 10x  10" 10x  10" 10x     Right piriformis stretch    30" 3x  30" 3x  30" 3x     Slump sliders right    3" 15x  3" 15x  3" 15x     PPT with march    30x  30x  30x     Alternating UE/LE raises supine             Prone UE raises             Prone LE raises              PPT    5" 30x  5" 30x  5" 30x      UBE standing    5'f/5'b  5'f/5'b  5'f/5'b                                                                   Modalities                                                             Assessment: Pt had no pain post stretches and Kayla today  He noted always feeling better after performing exercises  He was educated on HEP including reps for stretches to decrease pain outside of therapy  Plan: Progress treatment as tolerated

## 2018-07-23 ENCOUNTER — OFFICE VISIT (OUTPATIENT)
Dept: PHYSICAL THERAPY | Facility: CLINIC | Age: 53
End: 2018-07-23
Payer: OTHER MISCELLANEOUS

## 2018-07-23 DIAGNOSIS — M51.26 LUMBAR DISC HERNIATION: ICD-10-CM

## 2018-07-23 DIAGNOSIS — M50.20 CERVICAL DISC HERNIATION: Primary | ICD-10-CM

## 2018-07-23 PROCEDURE — 97112 NEUROMUSCULAR REEDUCATION: CPT

## 2018-07-23 PROCEDURE — 97110 THERAPEUTIC EXERCISES: CPT

## 2018-07-23 NOTE — PROGRESS NOTES
Daily Note     Today's date: 2018  Patient name: Kilo Nugent  : 1965  MRN: [id number]  Referring provider: Marva Oseguera MD  Dx:   Encounter Diagnosis     ICD-10-CM    1  Cervical disc herniation M50 20    2  Lumbar disc herniation M51 26                   Subjective: Pt denies any pain upon arrival today  He states that he was able to go for a jog this weekend without pain  Objective: See treatment diary below  Manual                                                                                          Exercise Diary     Postural ed JF  SA  SA  SA  SA   Cervical AROM Instructed  15x  15x  15x  15x   Bilateral UT stretch 3x30"  30" 3x  30" 3x  30" 3x  30" 3x   Webslide rows H,M,L      GTB 20x  GTB 20x  BTB 20x   Cervical retractions    2x10  2x10  2x10  2x10   Bilateral Levator scap stretch    30" 3x  30" 3x  30" 3x  30" 3x   Seated lower back stretch    30" 3x  30" 3x  30" 3x  30" 3x   LTR supine    10" 10x  10" 10x  10" 10x  10" 10x   Right piriformis stretch    30" 3x  30" 3x  30" 3x  30" 3x   Slump sliders right    3" 15x  3" 15x  3" 15x  NP   PPT with march    30x  30x  30x  30x   Alternating UE/LE raises supine             Prone UE raises          10x   Prone LE raises          10x    PPT    5" 30x  5" 30x  5" 30x  5" 30x    UBE standing    5'f/5'b  5'f/5'b  5'f/5'b  5'f/5'b                                                                 Modalities                                                              Assessment:  Pt had no pain post Kayla today despite noting some ms soreness from new exercises  He was educated on stretches post activity outside of therapy to decrease LB pain and tension  Plan: Progress treatment as tolerated

## 2018-07-27 ENCOUNTER — OFFICE VISIT (OUTPATIENT)
Dept: PHYSICAL THERAPY | Facility: CLINIC | Age: 53
End: 2018-07-27
Payer: OTHER MISCELLANEOUS

## 2018-07-27 DIAGNOSIS — M51.26 LUMBAR DISC HERNIATION: ICD-10-CM

## 2018-07-27 DIAGNOSIS — M50.20 CERVICAL DISC HERNIATION: Primary | ICD-10-CM

## 2018-07-27 PROCEDURE — 97110 THERAPEUTIC EXERCISES: CPT

## 2018-07-27 PROCEDURE — 97112 NEUROMUSCULAR REEDUCATION: CPT

## 2018-07-27 NOTE — PROGRESS NOTES
Daily Note     Today's date: 2018  Patient name: Aspen Browning  : 1965  MRN: [id number]  Referring provider: Ian Bran MD  Dx:   Encounter Diagnosis     ICD-10-CM    1  Cervical disc herniation M50 20    2  Lumbar disc herniation M51 26                   Subjective: Pt c/o 5/10 pain in LB today  He states that he slept wrong and is feeling sore today  Objective: See treatment diary below  Manual                                                                                          Exercise Diary     Postural ed SA  SA  SA  SA  SA   Cervical AROM 15x  15x  15x  15x  15x   Bilateral UT stretch 3x30"  30" 3x  30" 3x  30" 3x  30" 3x   Webslide rows H,M,L  BTB 20x    GTB 20x  GTB 20x  BTB 20x   Cervical retractions  2x10  2x10  2x10  2x10  2x10   Bilateral Levator scap stretch  30" 3x  30" 3x  30" 3x  30" 3x  30" 3x   Seated lower back stretch  30" 3x  30" 3x  30" 3x  30" 3x  30" 3x   LTR supine  10" 10x  10" 10x  10" 10x  10" 10x  10" 10x   Right piriformis stretch  30" 3x  30" 3x  30" 3x  30" 3x  30" 3x   Slump sliders right  3" 15x  3" 15x  3" 15x  3" 15x  NP   PPT with march  30x  30x  30x  30x  30x   Alternating UE/LE raises supine             Prone UE raises  10x        10x   Prone LE raises  10x        10x    PPT  5" 30x  5" 30x  5" 30x  5" 30x  5" 30x    UBE standing  5'f/5'b   5'f/5'b  5'f/5'b  5'f/5'b  5'f/5'b                                                                 Modalities                                                             Assessment: Pt had little to no pain post treatment today  He noted feeling as if the stretches is really what he needed  He was educated on HEP including stretches to decrease pain outside of therapy  Pt was advised to take a break from running this weekend  Plan: Progress treatment as tolerated

## 2018-07-30 ENCOUNTER — OFFICE VISIT (OUTPATIENT)
Dept: PHYSICAL THERAPY | Facility: CLINIC | Age: 53
End: 2018-07-30
Payer: OTHER MISCELLANEOUS

## 2018-07-30 DIAGNOSIS — M51.26 LUMBAR DISC HERNIATION: ICD-10-CM

## 2018-07-30 DIAGNOSIS — M50.20 CERVICAL DISC HERNIATION: Primary | ICD-10-CM

## 2018-07-30 PROCEDURE — 97110 THERAPEUTIC EXERCISES: CPT

## 2018-07-30 PROCEDURE — 97112 NEUROMUSCULAR REEDUCATION: CPT

## 2018-07-30 NOTE — PROGRESS NOTES
Daily Note     Today's date: 2018  Patient name: Carly Torres  : 1965  MRN: [id number]  Referring provider: Trista Bravo MD  Dx:   Encounter Diagnosis     ICD-10-CM    1  Cervical disc herniation M50 20    2  Lumbar disc herniation M51 26                   Subjective: Pt denies any pain upon arrival today  He states that he took a break from running this weekend       Objective: See treatment diary below  Manual                                                                                          Exercise Diary     Postural ed SA  SA  SA  SA  SA   Cervical AROM 15x  15x  15x  15x  15x   Bilateral UT stretch 3x30"  30" 3x  30" 3x  30" 3x  30" 3x   Webslide rows H,M,L  BTB 20x  purple 20x  GTB 20x  GTB 20x  BTB 20x   Cervical retractions  2x10  2x10  2x10  2x10  2x10   Bilateral Levator scap stretch  30" 3x  30" 3x  30" 3x  30" 3x  30" 3x   Seated lower back stretch  30" 3x  30" 3x  30" 3x  30" 3x  30" 3x   LTR supine  10" 10x  10" 10x  10" 10x  10" 10x  10" 10x   Right piriformis stretch  30" 3x  30" 3x  30" 3x  30" 3x  30" 3x   Slump sliders right  3" 15x  3" 15x  3" 15x  3" 15x  NP   PPT with march  30x  30x  30x  30x  30x   Alternating UE/LE raises supine             Prone UE raises  10x  10x      10x   Prone LE raises  10x  10x      10x    PPT  5" 30x  5" 30x  5" 30x  5" 30x  5" 30x    UBE standing  5'f/5'b   5'f/5'b  5'f/5'b  5'f/5'b  5'f/5'b                                                                 Modalities                                                             Assessment: Pt had no pain post treatment today  He noted feeling very good and showed interest in the fitness program  Pt may DC to fitness program next week  Plan: Progress treatment as tolerated

## 2018-08-01 ENCOUNTER — OFFICE VISIT (OUTPATIENT)
Dept: PHYSICAL THERAPY | Facility: CLINIC | Age: 53
End: 2018-08-01
Payer: OTHER MISCELLANEOUS

## 2018-08-01 DIAGNOSIS — M51.26 LUMBAR DISC HERNIATION: ICD-10-CM

## 2018-08-01 DIAGNOSIS — M50.20 CERVICAL DISC HERNIATION: Primary | ICD-10-CM

## 2018-08-01 PROCEDURE — 97110 THERAPEUTIC EXERCISES: CPT

## 2018-08-01 PROCEDURE — 97112 NEUROMUSCULAR REEDUCATION: CPT

## 2018-08-01 NOTE — PROGRESS NOTES
Daily Note     Today's date: 2018  Patient name: Amina Estrella  : 1965  MRN: [id number]  Referring provider: Paula Verduzco MD  Dx:   Encounter Diagnosis     ICD-10-CM    1  Cervical disc herniation M50 20    2  Lumbar disc herniation M51 26                   Subjective: Pt reports feeling stiff today in his neck, "I think it's the rain "         Objective: See treatment diary below  Manual                                                                                          Exercise Diary     Postural ed SA  SA  SA  SA  SA    Cervical AROM 15x  15x  15x  15x  15x 15x   Bilateral UT stretch 3x30"  30" 3x  30" 3x  30" 3x  30" 3x 30'' x 3   Webslide rows H,M,L  BTB 20x  purple 20x  GTB 20x  GTB 20x  BTB 20x Pt declined   Cervical retractions  2x10  2x10  2x10  2x10  2x10 2x10    Bilateral Levator scap stretch  30" 3x  30" 3x  30" 3x  30" 3x  30" 3x 30'' x 3    Seated lower back stretch  30" 3x  30" 3x  30" 3x  30" 3x  30" 3x 30'' x 3   LTR supine  10" 10x  10" 10x  10" 10x  10" 10x  10" 10x 10'' x 10   Right piriformis stretch  30" 3x  30" 3x  30" 3x  30" 3x  30" 3x 30'' x 3 bilat   Slump sliders right  3" 15x  3" 15x  3" 15x  3" 15x  NP NP   PPT with march  30x  30x  30x  30x  30x 30x   Alternating UE/LE raises supine              Prone UE raises  10x  10x      10x Pt declined   Prone LE raises  10x  10x      10x 10x    PPT  5" 30x  5" 30x  5" 30x  5" 30x  5" 30x 5'' 30x     UBE standing  5'f/5'b   5'f/5'b  5'f/5'b  5'f/5'b  5'f/5'b 5'f/5'b                                                                     Modalities                                                                   Assessment: Tolerated treatment well  Needed verbal cueing for exercise technique with TE, especially for breathing with PPT  prone UE and t-band exercises due to increasing stiffness/soreness in his LB after mat exercises  Resume full program nv   Pt declined  Patient would benefit from continued PT      Plan: Continue per plan of care

## 2018-08-02 ENCOUNTER — APPOINTMENT (OUTPATIENT)
Dept: PHYSICAL THERAPY | Facility: CLINIC | Age: 53
End: 2018-08-02
Payer: OTHER MISCELLANEOUS

## 2018-08-03 ENCOUNTER — APPOINTMENT (OUTPATIENT)
Dept: PHYSICAL THERAPY | Facility: CLINIC | Age: 53
End: 2018-08-03
Payer: OTHER MISCELLANEOUS

## 2018-08-06 ENCOUNTER — OFFICE VISIT (OUTPATIENT)
Dept: PHYSICAL THERAPY | Facility: CLINIC | Age: 53
End: 2018-08-06
Payer: OTHER MISCELLANEOUS

## 2018-08-06 DIAGNOSIS — M51.26 LUMBAR DISC HERNIATION: ICD-10-CM

## 2018-08-06 DIAGNOSIS — M50.20 CERVICAL DISC HERNIATION: Primary | ICD-10-CM

## 2018-08-06 PROCEDURE — 97112 NEUROMUSCULAR REEDUCATION: CPT

## 2018-08-06 PROCEDURE — 97110 THERAPEUTIC EXERCISES: CPT

## 2018-08-06 NOTE — PROGRESS NOTES
Daily Note     Today's date: 2018  Patient name: Ngoc Bradfodr  : 1965  MRN: [id number]  Referring provider: Dejuan Dodson MD  Dx:   Encounter Diagnosis     ICD-10-CM    1  Cervical disc herniation M50 20    2  Lumbar disc herniation M51 26                   Subjective: Pt denies any pain in his neck and back today despite noting some stiffness  He states that his shoulder is sore today but he was swimming this week so he thinks it may be from that  Objective: See treatment diary below  Manual                                                                                          Exercise Diary     Postural ed SA  SA  SA  SA  SA     Cervical AROM 15x  15x  15x  15x  15x 15x   Bilateral UT stretch 3x30"  30" 3x  30" 3x  30" 3x  30" 3x 30'' x 3   Webslide rows H,M,L  purple 20x  purple 20x  GTB 20x  GTB 20x  BTB 20x Pt declined   Cervical retractions  2x10  2x10  2x10  2x10  2x10 2x10    Bilateral Levator scap stretch  30" 3x  30" 3x  30" 3x  30" 3x  30" 3x 30'' x 3    Seated lower back stretch  30" 3x  30" 3x  30" 3x  30" 3x  30" 3x 30'' x 3   LTR supine  10" 10x  10" 10x  10" 10x  10" 10x  10" 10x 10'' x 10   Right piriformis stretch  30" 3x  30" 3x  30" 3x  30" 3x  30" 3x 30'' x 3 bilat   Slump sliders right  3" 15x  3" 15x  3" 15x  3" 15x  NP NP   PPT with march  30x  30x  30x  30x  30x 30x   Alternating UE/LE raises supine               Prone UE raises  10x  10x      10x Pt declined   Prone LE raises  10x  10x      10x 10x    PPT  5" 30x  5" 30x  5" 30x  5" 30x  5" 30x 5'' 30x     UBE standing  5'f/5'b   5'f/5'b  5'f/5'b  5'f/5'b  5'f/5'b 5'f/5'b                                                                         Modalities                                                              Assessment: Pt had no pain nor stiffness post treatment today  He noted always feeling better post stretches   He was educated on HEP including stretches to decrease stiffness outside of therapy  Pt was leaving therapy to go swimming for more exercise  Plan: Progress treatment as tolerated

## 2018-08-08 ENCOUNTER — APPOINTMENT (OUTPATIENT)
Dept: PHYSICAL THERAPY | Facility: CLINIC | Age: 53
End: 2018-08-08
Payer: OTHER MISCELLANEOUS

## 2018-08-10 PROCEDURE — G8992 OTHER PT/OT  D/C STATUS: HCPCS | Performed by: PHYSICAL THERAPIST

## 2018-08-10 PROCEDURE — G8991 OTHER PT/OT GOAL STATUS: HCPCS | Performed by: PHYSICAL THERAPIST

## 2018-08-10 NOTE — PROGRESS NOTES
Patient will be discharged per attendance policy  Was a NS on 8/8, so appointment was rescheduled for 8/9  Patient then was a NS for appointment on 8/9 and 8/10  Had been feeling very good when last seen with no reports of pain

## 2018-10-30 PROBLEM — F41.9 ANXIETY: Status: ACTIVE | Noted: 2018-10-30

## 2018-11-09 DIAGNOSIS — F41.9 ANXIETY: ICD-10-CM

## 2018-11-09 RX ORDER — DIAZEPAM 5 MG/1
5 TABLET ORAL EVERY 6 HOURS PRN
Qty: 30 TABLET | Refills: 0 | Status: SHIPPED | OUTPATIENT
Start: 2018-11-09 | End: 2018-12-07

## 2018-12-07 ENCOUNTER — OFFICE VISIT (OUTPATIENT)
Dept: FAMILY MEDICINE CLINIC | Facility: CLINIC | Age: 53
End: 2018-12-07
Payer: COMMERCIAL

## 2018-12-07 VITALS
HEIGHT: 64 IN | DIASTOLIC BLOOD PRESSURE: 90 MMHG | SYSTOLIC BLOOD PRESSURE: 124 MMHG | TEMPERATURE: 98.3 F | WEIGHT: 155 LBS | BODY MASS INDEX: 26.46 KG/M2

## 2018-12-07 DIAGNOSIS — N52.9 ERECTILE DYSFUNCTION, UNSPECIFIED ERECTILE DYSFUNCTION TYPE: ICD-10-CM

## 2018-12-07 DIAGNOSIS — F41.9 ANXIETY: Primary | ICD-10-CM

## 2018-12-07 PROCEDURE — 99213 OFFICE O/P EST LOW 20 MIN: CPT | Performed by: FAMILY MEDICINE

## 2018-12-07 PROCEDURE — 3008F BODY MASS INDEX DOCD: CPT | Performed by: FAMILY MEDICINE

## 2018-12-07 RX ORDER — DIAZEPAM 5 MG/1
5 TABLET ORAL EVERY 8 HOURS PRN
Qty: 30 TABLET | Refills: 1 | Status: SHIPPED | OUTPATIENT
Start: 2018-12-07 | End: 2021-01-13 | Stop reason: SDUPTHER

## 2018-12-07 RX ORDER — SILDENAFIL CITRATE 20 MG/1
20 TABLET ORAL 3 TIMES DAILY
Qty: 10 TABLET | Refills: 1 | Status: SHIPPED | OUTPATIENT
Start: 2018-12-07

## 2018-12-07 NOTE — PROGRESS NOTES
Assessment/Plan:    No problem-specific Assessment & Plan notes found for this encounter  Diagnoses and all orders for this visit:    Anxiety  -     diazepam (VALIUM) 5 mg tablet; Take 1 tablet (5 mg total) by mouth every 8 (eight) hours as needed for anxiety    Erectile dysfunction, unspecified erectile dysfunction type  -     sildenafil (REVATIO) 20 mg tablet; Take 1 tablet (20 mg total) by mouth 3 (three) times a day          Subjective:      Patient ID: Adri Monae is a 50 y o  male  Pt complains of worsening anxiety, over not making any money and not receiving workman's comp, pt ran out of valium, pt also complains of ED and would like to tried viagra      Anxiety   Presents for follow-up visit  Symptoms include irritability, nervous/anxious behavior and restlessness  Patient reports no chest pain, palpitations, shortness of breath or suicidal ideas  Symptoms occur constantly  The quality of sleep is good  The following portions of the patient's history were reviewed and updated as appropriate: allergies, current medications, past family history, past medical history, past social history, past surgical history and problem list     Review of Systems   Constitutional: Positive for irritability  Respiratory: Negative for shortness of breath and wheezing  Cardiovascular: Negative for chest pain and palpitations  Psychiatric/Behavioral: Negative for suicidal ideas  The patient is nervous/anxious  Objective:      /90 (BP Location: Right arm, Patient Position: Sitting, Cuff Size: Adult)   Temp 98 3 °F (36 8 °C) (Tympanic)   Ht 5' 4" (1 626 m)   Wt 70 3 kg (155 lb)   BMI 26 61 kg/m²          Physical Exam   Constitutional: He is oriented to person, place, and time  He appears well-developed and well-nourished  No distress  HENT:   Head: Normocephalic and atraumatic  Eyes: Pupils are equal, round, and reactive to light   Conjunctivae and EOM are normal  No scleral icterus  Neck: Normal range of motion  Neck supple  Cardiovascular: Normal rate, regular rhythm and normal heart sounds  No murmur heard  Pulmonary/Chest: Effort normal and breath sounds normal  No respiratory distress  He has no wheezes  He has no rales  Abdominal: Soft  Bowel sounds are normal  He exhibits no distension and no mass  There is no tenderness  There is no rebound and no guarding  Musculoskeletal: He exhibits no edema  Lymphadenopathy:     He has no cervical adenopathy  Neurological: He is alert and oriented to person, place, and time  He exhibits normal muscle tone  Skin: Skin is warm and dry  No rash noted  He is not diaphoretic  No erythema  No pallor  Psychiatric: He has a normal mood and affect  His behavior is normal  Judgment and thought content normal    Nursing note and vitals reviewed

## 2018-12-26 ENCOUNTER — OFFICE VISIT (OUTPATIENT)
Dept: URGENT CARE | Facility: CLINIC | Age: 53
End: 2018-12-26
Payer: COMMERCIAL

## 2018-12-26 VITALS
HEART RATE: 98 BPM | WEIGHT: 157 LBS | HEIGHT: 65 IN | BODY MASS INDEX: 26.16 KG/M2 | RESPIRATION RATE: 18 BRPM | DIASTOLIC BLOOD PRESSURE: 82 MMHG | SYSTOLIC BLOOD PRESSURE: 122 MMHG | OXYGEN SATURATION: 98 % | TEMPERATURE: 99.1 F

## 2018-12-26 DIAGNOSIS — J02.0 STREP THROAT: Primary | ICD-10-CM

## 2018-12-26 LAB — S PYO AG THROAT QL: POSITIVE

## 2018-12-26 PROCEDURE — 99283 EMERGENCY DEPT VISIT LOW MDM: CPT | Performed by: PHYSICIAN ASSISTANT

## 2018-12-26 PROCEDURE — 87430 STREP A AG IA: CPT | Performed by: PHYSICIAN ASSISTANT

## 2018-12-26 PROCEDURE — G0382 LEV 3 HOSP TYPE B ED VISIT: HCPCS | Performed by: PHYSICIAN ASSISTANT

## 2018-12-26 RX ORDER — AMOXICILLIN 500 MG/1
500 TABLET, FILM COATED ORAL 3 TIMES DAILY
Qty: 30 TABLET | Refills: 0 | Status: SHIPPED | OUTPATIENT
Start: 2018-12-26 | End: 2019-01-05

## 2018-12-26 NOTE — PROGRESS NOTES
[facility]        NAME: Adri Monae is a 48 y o  male  : 1965    MRN: [id number]  DATE: 2018  TIME: 11:15 AM    Assessment and Plan   Strep throat [J02 0]  1  Strep throat  amoxicillin (AMOXIL) 500 MG tablet    POCT rapid strepA         Patient Instructions       Positive rapid strep in the office    Patient Instructions   Tylenol and motrin for sore throat  Pharyngitis   AMBULATORY CARE:   Pharyngitis , or sore throat, is inflammation of the tissues and structures in your pharynx (throat)  Pharyngitis is most often caused by bacteria  It may also be caused by a cold or flu virus  Other causes include smoking, allergies, or acid reflux  Signs and symptoms that may occur with pharyngitis:   · Sore throat or pain when you swallow    · Fever, chills, and body aches    · Hoarse or raspy voice    · Cough, runny or stuffy nose, itchy or watery eyes    · Headache    · Upset stomach and loss of appetite    · Mild neck stiffness    · Swollen glands that feel like hard lumps when you touch your neck    · White and yellow pus-filled blisters in the back of your throat  Call 911 for any of the following:   · You have trouble breathing or swallowing because your throat is swollen or sore  Seek care immediately if:   · You are drooling because it hurts too much to swallow  · Your fever is higher than 102? F (39?C) or lasts longer than 3 days  · You are confused  · You taste blood in your throat  Contact your healthcare provider if:   · Your throat pain gets worse  · You have a painful lump in your throat that does not go away after 5 days  · Your symptoms do not improve after 5 days  · You have questions or concerns about your condition or care  Treatment for pharyngitis:  Viral pharyngitis will go away on its own without treatment  Your sore throat should start to feel better in 3 to 5 days for both viral and bacterial infections   You may need any of the following:  · Antibiotics  treat a bacterial infection  · NSAIDs , such as ibuprofen, help decrease swelling, pain, and fever  NSAIDs can cause stomach bleeding or kidney problems in certain people  If you take blood thinner medicine, always ask your healthcare provider if NSAIDs are safe for you  Always read the medicine label and follow directions  · Acetaminophen  decreases pain and fever  It is available without a doctor's order  Ask how much to take and how often to take it  Follow directions  Acetaminophen can cause liver damage if not taken correctly  Manage your symptoms:   · Gargle salt water  Mix ¼ teaspoon salt in an 8 ounce glass of warm water and gargle  This may help decrease swelling in your throat  · Drink liquids as directed  You may need to drink more liquids than usual  Liquids may help soothe your throat and prevent dehydration  Ask how much liquid to drink each day and which liquids are best for you  · Use a cool-steam humidifier  to help moisten the air in your room and calm your cough  · Soothe your throat  with cough drops, ice, soft foods, or popsicles  Prevent the spread of pharyngitis:  Cover your mouth and nose when you cough or sneeze  Do not share food or drinks  Wash your hands often  Use soap and water  If soap and water are unavailable, use an alcohol based hand   Follow up with your healthcare provider as directed:  Write down your questions so you remember to ask them during your visits  © 2017 [address] [employer] Information is for End User's use only and may not be sold, redistributed or otherwise used for commercial purposes  All illustrations and images included in CareNotes® are the copyrighted property of [employer]  or Shashank Ferguson  The above information is an  only  It is not intended as medical advice for individual conditions or treatments   Talk to your doctor, nurse or pharmacist before following any medical regimen to see if it is safe and effective for you  Follow up with PCP in 3-5 days  Proceed to  ER if symptoms worsen  Chief Complaint     Chief Complaint   Patient presents with    Sore Throat     x a few days  also complains of fever and weakness, head pain  History of Present Illness         28-year-old male complains of sore throat starting yesterday  Fever at home  Very painful to swallow  No cough or runny nose  No medicines over-the-counter for this          Review of Systems   Review of Systems      Current Medications       Current Outpatient Prescriptions:     nystatin (MYCOSTATIN) cream, APPLY TOPICALLY 2 (TWO) TIMES A DAY FOR 10 DAYS, Disp: , Rfl: 1    amoxicillin (AMOXIL) 500 MG tablet, Take 1 tablet (500 mg total) by mouth 3 (three) times a day for 10 days, Disp: 30 tablet, Rfl: 0    cyclobenzaprine (FLEXERIL) 10 mg tablet, Take 1 tablet (10 mg total) by mouth 3 (three) times a day as needed for muscle spasms (Patient not taking: Reported on 12/26/2018 ), Disp: 30 tablet, Rfl: 2    diazepam (VALIUM) 5 mg tablet, Take 1 tablet (5 mg total) by mouth every 8 (eight) hours as needed for anxiety (Patient not taking: Reported on 12/26/2018 ), Disp: 30 tablet, Rfl: 1    nystatin-triamcinolone (MYCOLOG-II) cream, Apply topically 2 (two) times a day for 10 days, Disp: 120 g, Rfl: 1    oxyCODONE-acetaminophen (PERCOCET)  mg per tablet, Take 1 tablet by mouth every 4 (four) hours as needed for moderate pain, Disp: , Rfl:     predniSONE 20 mg tablet, Please take 3 -20 mg tabs X3 days , Please take 2 - 20mg tabs X 3 days, Please take 1-20 mg tab X1 days (Patient not taking: Reported on 12/7/2018 ), Disp: 16 tablet, Rfl: 0    sildenafil (REVATIO) 20 mg tablet, Take 1 tablet (20 mg total) by mouth 3 (three) times a day (Patient not taking: Reported on 12/26/2018 ), Disp: 10 tablet, Rfl: 1    valACYclovir (VALTREX) 1,000 mg tablet, Take 1 tablet (1,000 mg total) by mouth 2 (two) times a day for 5 days, Disp: 10 tablet, Rfl: 0    Current Allergies     Allergies as of 12/26/2018    (No Known Allergies)            The following portions of the patient's history were reviewed and updated as appropriate: allergies, current medications, past family history, past medical history, past social history, past surgical history and problem list      Past Medical History:   Diagnosis Date    Back pain        Past Surgical History:   Procedure Laterality Date    BACK SURGERY  09/2017    HERNIA REPAIR      INGUINAL    MASS EXCISION N/A 10/25/2017    Procedure: RIGHT BACK MASS EXCISION;  Surgeon: Andrew Connolly MD;  [facility];  Service: General    NECK SURGERY         No family history on file  Medications have been verified  Objective   /82 (BP Location: Left arm, Patient Position: Sitting)   Pulse 98   Temp 99 1 °F (37 3 °C) (Tympanic)   Resp 18   Ht 5' 5" (1 651 m)   Wt 71 2 kg (157 lb)   SpO2 98%   BMI 26 13 kg/m²        Physical Exam     Physical Exam   Constitutional: He appears well-developed and well-nourished  No distress  HENT:   Right Ear: Tympanic membrane, external ear and ear canal normal    Left Ear: Tympanic membrane, external ear and ear canal normal    Nose: Nose normal  Right sinus exhibits no maxillary sinus tenderness and no frontal sinus tenderness  Left sinus exhibits no maxillary sinus tenderness and no frontal sinus tenderness  Mouth/Throat: Oropharyngeal exudate, posterior oropharyngeal edema and posterior oropharyngeal erythema present  No tonsillar abscesses  Eyes: Pupils are equal, round, and reactive to light  Conjunctivae and EOM are normal  No scleral icterus  Neck: Normal range of motion  Neck supple  Cardiovascular: Normal rate, regular rhythm and normal heart sounds  Pulmonary/Chest: Effort normal and breath sounds normal  No respiratory distress  He has no wheezes  He has no rales  Abdominal: Soft   Bowel sounds are normal  He exhibits no distension and no mass  There is no tenderness  There is no rebound and no guarding  Lymphadenopathy:     He has cervical adenopathy  Right cervical: Superficial cervical adenopathy present  Left cervical: Superficial cervical adenopathy present  Skin: Skin is warm and dry  No rash noted

## 2018-12-26 NOTE — PATIENT INSTRUCTIONS
Tylenol and motrin for sore throat  Pharyngitis   AMBULATORY CARE:   Pharyngitis , or sore throat, is inflammation of the tissues and structures in your pharynx (throat)  Pharyngitis is most often caused by bacteria  It may also be caused by a cold or flu virus  Other causes include smoking, allergies, or acid reflux  Signs and symptoms that may occur with pharyngitis:   · Sore throat or pain when you swallow    · Fever, chills, and body aches    · Hoarse or raspy voice    · Cough, runny or stuffy nose, itchy or watery eyes    · Headache    · Upset stomach and loss of appetite    · Mild neck stiffness    · Swollen glands that feel like hard lumps when you touch your neck    · White and yellow pus-filled blisters in the back of your throat  Call 911 for any of the following:   · You have trouble breathing or swallowing because your throat is swollen or sore  Seek care immediately if:   · You are drooling because it hurts too much to swallow  · Your fever is higher than 102? F (39?C) or lasts longer than 3 days  · You are confused  · You taste blood in your throat  Contact your healthcare provider if:   · Your throat pain gets worse  · You have a painful lump in your throat that does not go away after 5 days  · Your symptoms do not improve after 5 days  · You have questions or concerns about your condition or care  Treatment for pharyngitis:  Viral pharyngitis will go away on its own without treatment  Your sore throat should start to feel better in 3 to 5 days for both viral and bacterial infections  You may need any of the following:  · Antibiotics  treat a bacterial infection  · NSAIDs , such as ibuprofen, help decrease swelling, pain, and fever  NSAIDs can cause stomach bleeding or kidney problems in certain people  If you take blood thinner medicine, always ask your healthcare provider if NSAIDs are safe for you   Always read the medicine label and follow directions  · Acetaminophen  decreases pain and fever  It is available without a doctor's order  Ask how much to take and how often to take it  Follow directions  Acetaminophen can cause liver damage if not taken correctly  Manage your symptoms:   · Gargle salt water  Mix ¼ teaspoon salt in an 8 ounce glass of warm water and gargle  This may help decrease swelling in your throat  · Drink liquids as directed  You may need to drink more liquids than usual  Liquids may help soothe your throat and prevent dehydration  Ask how much liquid to drink each day and which liquids are best for you  · Use a cool-steam humidifier  to help moisten the air in your room and calm your cough  · Soothe your throat  with cough drops, ice, soft foods, or popsicles  Prevent the spread of pharyngitis:  Cover your mouth and nose when you cough or sneeze  Do not share food or drinks  Wash your hands often  Use soap and water  If soap and water are unavailable, use an alcohol based hand   Follow up with your healthcare provider as directed:  Write down your questions so you remember to ask them during your visits  © 2017 [facility] Information is for End User's use only and may not be sold, redistributed or otherwise used for commercial purposes  All illustrations and images included in CareNotes® are the copyrighted property of [employer]  or Shashank Ferguson  The above information is an  only  It is not intended as medical advice for individual conditions or treatments  Talk to your doctor, nurse or pharmacist before following any medical regimen to see if it is safe and effective for you

## 2019-01-02 ENCOUNTER — OFFICE VISIT (OUTPATIENT)
Dept: URGENT CARE | Facility: CLINIC | Age: 54
End: 2019-01-02
Payer: COMMERCIAL

## 2019-01-02 VITALS
SYSTOLIC BLOOD PRESSURE: 137 MMHG | WEIGHT: 159 LBS | OXYGEN SATURATION: 97 % | RESPIRATION RATE: 18 BRPM | HEIGHT: 65 IN | TEMPERATURE: 98.7 F | HEART RATE: 78 BPM | DIASTOLIC BLOOD PRESSURE: 90 MMHG | BODY MASS INDEX: 26.49 KG/M2

## 2019-01-02 DIAGNOSIS — H60.501 ACUTE OTITIS EXTERNA OF RIGHT EAR, UNSPECIFIED TYPE: Primary | ICD-10-CM

## 2019-01-02 DIAGNOSIS — T16.1XXA FOREIGN BODY OF RIGHT EAR, INITIAL ENCOUNTER: ICD-10-CM

## 2019-01-02 PROCEDURE — G0382 LEV 3 HOSP TYPE B ED VISIT: HCPCS | Performed by: PHYSICIAN ASSISTANT

## 2019-01-02 PROCEDURE — 99283 EMERGENCY DEPT VISIT LOW MDM: CPT | Performed by: PHYSICIAN ASSISTANT

## 2019-01-02 PROCEDURE — 69200 CLEAR OUTER EAR CANAL: CPT | Performed by: PHYSICIAN ASSISTANT

## 2019-01-02 PROCEDURE — 99213 OFFICE O/P EST LOW 20 MIN: CPT | Performed by: PHYSICIAN ASSISTANT

## 2019-01-02 RX ORDER — NEOMYCIN SULFATE, POLYMYXIN B SULFATE AND HYDROCORTISONE 10; 3.5; 1 MG/ML; MG/ML; [USP'U]/ML
4 SUSPENSION/ DROPS AURICULAR (OTIC) EVERY 6 HOURS SCHEDULED
Qty: 10 ML | Refills: 0 | Status: SHIPPED | OUTPATIENT
Start: 2019-01-02 | End: 2019-01-07

## 2019-01-02 NOTE — PROGRESS NOTES
[facility]        NAME: Angelita Wiggins is a 48 y o  male  : 1965    MRN: [id number]  DATE: 2019  TIME: 1:04 PM    Assessment and Plan   Acute otitis externa of right ear, unspecified type [H60 501]  1  Acute otitis externa of right ear, unspecified type  neomycin-polymyxin-hydrocortisone (CORTISPORIN) 0 35%-10,000 units/mL-1% otic suspension   2  Foreign body of right ear, initial encounter           Patient Instructions      keep the ear dry today  Follow up with PCP in 3-5 days  Proceed to  ER if symptoms worsen  Chief Complaint     Chief Complaint   Patient presents with   Lola Smith says he was cleaning his ear out yesterday with peroxide and now he has an earache, and it now looks like strange things are draining from the right ear         History of Present Illness         33-year-old male complains of right ear pain and some drainage for 2 days  He tried to get wax out with a Q-tip and some Debrox  He says that got the wax out but now is having drainage           Review of Systems   Review of Systems      Current Medications       Current Outpatient Prescriptions:     amoxicillin (AMOXIL) 500 MG tablet, Take 1 tablet (500 mg total) by mouth 3 (three) times a day for 10 days, Disp: 30 tablet, Rfl: 0    nystatin (MYCOSTATIN) cream, APPLY TOPICALLY 2 (TWO) TIMES A DAY FOR 10 DAYS, Disp: , Rfl: 1    cyclobenzaprine (FLEXERIL) 10 mg tablet, Take 1 tablet (10 mg total) by mouth 3 (three) times a day as needed for muscle spasms (Patient not taking: Reported on 2018 ), Disp: 30 tablet, Rfl: 2    diazepam (VALIUM) 5 mg tablet, Take 1 tablet (5 mg total) by mouth every 8 (eight) hours as needed for anxiety (Patient not taking: Reported on 2018 ), Disp: 30 tablet, Rfl: 1    neomycin-polymyxin-hydrocortisone (CORTISPORIN) 0 35%-10,000 units/mL-1% otic suspension, Administer 4 drops to the right ear every 6 (six) hours for 5 days, Disp: 10 mL, Rfl: 0   nystatin-triamcinolone (MYCOLOG-II) cream, Apply topically 2 (two) times a day for 10 days, Disp: 120 g, Rfl: 1    oxyCODONE-acetaminophen (PERCOCET)  mg per tablet, Take 1 tablet by mouth every 4 (four) hours as needed for moderate pain, Disp: , Rfl:     predniSONE 20 mg tablet, Please take 3 -20 mg tabs X3 days , Please take 2 - 20mg tabs X 3 days, Please take 1-20 mg tab X1 days (Patient not taking: Reported on 12/7/2018 ), Disp: 16 tablet, Rfl: 0    sildenafil (REVATIO) 20 mg tablet, Take 1 tablet (20 mg total) by mouth 3 (three) times a day (Patient not taking: Reported on 12/26/2018 ), Disp: 10 tablet, Rfl: 1    valACYclovir (VALTREX) 1,000 mg tablet, Take 1 tablet (1,000 mg total) by mouth 2 (two) times a day for 5 days, Disp: 10 tablet, Rfl: 0    Current Allergies     Allergies as of 01/02/2019    (No Known Allergies)            The following portions of the patient's history were reviewed and updated as appropriate: allergies, current medications, past family history, past medical history, past social history, past surgical history and problem list      Past Medical History:   Diagnosis Date    Back pain        Past Surgical History:   Procedure Laterality Date    BACK SURGERY  09/2017    HERNIA REPAIR      INGUINAL    MASS EXCISION N/A 10/25/2017    Procedure: RIGHT BACK MASS EXCISION;  Surgeon: Eddie Price MD;  [facility]: [facility] OR;  Service: General    NECK SURGERY         History reviewed  No pertinent family history  Medications have been verified  Objective   /90 (BP Location: Right arm, Patient Position: Sitting, Cuff Size: Standard)   Pulse 78   Temp 98 7 °F (37 1 °C) (Tympanic)   Resp 18   Ht 5' 5" (1 651 m)   Wt 72 1 kg (159 lb)   SpO2 97%   BMI 26 46 kg/m²          Physical Exam     Physical Exam   Constitutional: He appears well-developed and well-nourished  HENT:   Head: Normocephalic and atraumatic     Nose: Nose normal    Mouth/Throat: Oropharynx is clear and moist     Left ear normal   Right ear with Basilio Denver foreign body in the ear canal   This was removed with lavage and alligator forceps  The right ear canal is erythematous and swollen with no drainage  Eyes: Pupils are equal, round, and reactive to light  Conjunctivae and EOM are normal    Cardiovascular: Normal rate and regular rhythm  Pulmonary/Chest: Effort normal and breath sounds normal      Foreign body removal  Date/Time: 1/2/2019 1:03 PM  Performed by: José Miguel Leonard  Authorized by: Tram Sinclair Protocol:Consent: Verbal consent obtained  Consent given by: patient    Body area: ear  Location details: right ear  Localization method: visualized  Removal mechanism: alligator forceps and irrigation  Complexity: simple  1 objects recovered    Objects recovered: cotton swab tip  Post-procedure assessment: foreign body removed

## 2021-01-04 ENCOUNTER — TRANSCRIBE ORDERS (OUTPATIENT)
Dept: FAMILY MEDICINE CLINIC | Facility: CLINIC | Age: 56
End: 2021-01-04

## 2021-01-04 DIAGNOSIS — I10 HYPERTENSION: Primary | ICD-10-CM

## 2021-01-13 ENCOUNTER — OFFICE VISIT (OUTPATIENT)
Dept: FAMILY MEDICINE CLINIC | Facility: CLINIC | Age: 56
End: 2021-01-13
Payer: COMMERCIAL

## 2021-01-13 ENCOUNTER — LAB (OUTPATIENT)
Dept: LAB | Facility: CLINIC | Age: 56
End: 2021-01-13
Payer: COMMERCIAL

## 2021-01-13 VITALS
OXYGEN SATURATION: 98 % | BODY MASS INDEX: 26.87 KG/M2 | DIASTOLIC BLOOD PRESSURE: 76 MMHG | HEIGHT: 64 IN | HEART RATE: 78 BPM | WEIGHT: 157.4 LBS | SYSTOLIC BLOOD PRESSURE: 138 MMHG | TEMPERATURE: 97.2 F

## 2021-01-13 DIAGNOSIS — Z00.00 ANNUAL PHYSICAL EXAM: Primary | ICD-10-CM

## 2021-01-13 DIAGNOSIS — Z11.4 SCREENING FOR HIV (HUMAN IMMUNODEFICIENCY VIRUS): ICD-10-CM

## 2021-01-13 DIAGNOSIS — Z13.31 NEGATIVE DEPRESSION SCREENING: ICD-10-CM

## 2021-01-13 DIAGNOSIS — I10 HYPERTENSION: ICD-10-CM

## 2021-01-13 DIAGNOSIS — Z12.11 SCREENING FOR COLORECTAL CANCER: ICD-10-CM

## 2021-01-13 DIAGNOSIS — R21 SKIN RASH: ICD-10-CM

## 2021-01-13 DIAGNOSIS — Z23 ENCOUNTER FOR IMMUNIZATION: ICD-10-CM

## 2021-01-13 DIAGNOSIS — E66.3 OVERWEIGHT (BMI 25.0-29.9): ICD-10-CM

## 2021-01-13 DIAGNOSIS — F41.9 ANXIETY: ICD-10-CM

## 2021-01-13 DIAGNOSIS — Z12.12 SCREENING FOR COLORECTAL CANCER: ICD-10-CM

## 2021-01-13 LAB
ALBUMIN SERPL BCP-MCNC: 3.9 G/DL (ref 3.5–5)
ALP SERPL-CCNC: 69 U/L (ref 46–116)
ALT SERPL W P-5'-P-CCNC: 32 U/L (ref 12–78)
ANION GAP SERPL CALCULATED.3IONS-SCNC: 3 MMOL/L (ref 4–13)
AST SERPL W P-5'-P-CCNC: 14 U/L (ref 5–45)
BASOPHILS # BLD AUTO: 0.07 THOUSANDS/ΜL (ref 0–0.1)
BASOPHILS NFR BLD AUTO: 1 % (ref 0–1)
BILIRUB SERPL-MCNC: 0.4 MG/DL (ref 0.2–1)
BUN SERPL-MCNC: 17 MG/DL (ref 5–25)
CALCIUM SERPL-MCNC: 9.1 MG/DL (ref 8.3–10.1)
CHLORIDE SERPL-SCNC: 106 MMOL/L (ref 100–108)
CHOLEST SERPL-MCNC: 176 MG/DL (ref 50–200)
CO2 SERPL-SCNC: 28 MMOL/L (ref 21–32)
CREAT SERPL-MCNC: 0.97 MG/DL (ref 0.6–1.3)
EOSINOPHIL # BLD AUTO: 0.56 THOUSAND/ΜL (ref 0–0.61)
EOSINOPHIL NFR BLD AUTO: 7 % (ref 0–6)
ERYTHROCYTE [DISTWIDTH] IN BLOOD BY AUTOMATED COUNT: 12 % (ref 11.6–15.1)
GFR SERPL CREATININE-BSD FRML MDRD: 89 ML/MIN/1.73SQ M
GLUCOSE P FAST SERPL-MCNC: 81 MG/DL (ref 65–99)
HCT VFR BLD AUTO: 44.6 % (ref 36.5–49.3)
HDLC SERPL-MCNC: 79 MG/DL
HGB BLD-MCNC: 15.3 G/DL (ref 12–17)
IMM GRANULOCYTES # BLD AUTO: 0.04 THOUSAND/UL (ref 0–0.2)
IMM GRANULOCYTES NFR BLD AUTO: 1 % (ref 0–2)
LDLC SERPL CALC-MCNC: 72 MG/DL (ref 0–100)
LYMPHOCYTES # BLD AUTO: 2.83 THOUSANDS/ΜL (ref 0.6–4.47)
LYMPHOCYTES NFR BLD AUTO: 36 % (ref 14–44)
MCH RBC QN AUTO: 30.9 PG (ref 26.8–34.3)
MCHC RBC AUTO-ENTMCNC: 34.3 G/DL (ref 31.4–37.4)
MCV RBC AUTO: 90 FL (ref 82–98)
MONOCYTES # BLD AUTO: 0.82 THOUSAND/ΜL (ref 0.17–1.22)
MONOCYTES NFR BLD AUTO: 10 % (ref 4–12)
NEUTROPHILS # BLD AUTO: 3.64 THOUSANDS/ΜL (ref 1.85–7.62)
NEUTS SEG NFR BLD AUTO: 45 % (ref 43–75)
NONHDLC SERPL-MCNC: 97 MG/DL
NRBC BLD AUTO-RTO: 0 /100 WBCS
PLATELET # BLD AUTO: 309 THOUSANDS/UL (ref 149–390)
PMV BLD AUTO: 9.6 FL (ref 8.9–12.7)
POTASSIUM SERPL-SCNC: 4.8 MMOL/L (ref 3.5–5.3)
PROT SERPL-MCNC: 7.2 G/DL (ref 6.4–8.2)
RBC # BLD AUTO: 4.95 MILLION/UL (ref 3.88–5.62)
SODIUM SERPL-SCNC: 137 MMOL/L (ref 136–145)
TRIGL SERPL-MCNC: 124 MG/DL
TSH SERPL DL<=0.05 MIU/L-ACNC: 2.64 UIU/ML (ref 0.36–3.74)
WBC # BLD AUTO: 7.96 THOUSAND/UL (ref 4.31–10.16)

## 2021-01-13 PROCEDURE — 90682 RIV4 VACC RECOMBINANT DNA IM: CPT

## 2021-01-13 PROCEDURE — 80053 COMPREHEN METABOLIC PANEL: CPT

## 2021-01-13 PROCEDURE — 84443 ASSAY THYROID STIM HORMONE: CPT

## 2021-01-13 PROCEDURE — 90471 IMMUNIZATION ADMIN: CPT

## 2021-01-13 PROCEDURE — 85025 COMPLETE CBC W/AUTO DIFF WBC: CPT

## 2021-01-13 PROCEDURE — 99396 PREV VISIT EST AGE 40-64: CPT | Performed by: FAMILY MEDICINE

## 2021-01-13 PROCEDURE — 90472 IMMUNIZATION ADMIN EACH ADD: CPT

## 2021-01-13 PROCEDURE — 90750 HZV VACC RECOMBINANT IM: CPT

## 2021-01-13 PROCEDURE — 80061 LIPID PANEL: CPT

## 2021-01-13 PROCEDURE — 36415 COLL VENOUS BLD VENIPUNCTURE: CPT

## 2021-01-13 RX ORDER — DIAZEPAM 5 MG/1
5 TABLET ORAL EVERY 8 HOURS PRN
Qty: 30 TABLET | Refills: 1 | Status: SHIPPED | OUTPATIENT
Start: 2021-01-13 | End: 2021-06-28 | Stop reason: SDUPTHER

## 2021-01-13 RX ORDER — NYSTATIN 100000 U/G
CREAM TOPICAL 2 TIMES DAILY
Qty: 30 G | Refills: 1 | Status: SHIPPED | OUTPATIENT
Start: 2021-01-13 | End: 2021-01-20

## 2021-01-13 NOTE — PROGRESS NOTES
Assessment/Plan:    No problem-specific Assessment & Plan notes found for this encounter  Diagnoses and all orders for this visit:    Annual physical exam    Screening for HIV (human immunodeficiency virus)  -     HIV 1/2 Antigen/Antibody (4th Generation) w Reflex [facility]; Future    Screening for colorectal cancer  -     Cologuard; Future    Encounter for immunization  -     influenza vaccine, quadrivalent, recombinant, PF, 0 5 mL, for patients 18 yr+ (FLUBLOK)  -     Zoster Vaccine Recombinant IM    Skin rash  -     nystatin (MYCOSTATIN) cream; Apply topically 2 (two) times a day for 7 days    Overweight (BMI 25 0-29  9)    Negative depression screening    Anxiety  -     diazepam (VALIUM) 5 mg tablet; Take 1 tablet (5 mg total) by mouth every 8 (eight) hours as needed for anxiety          PHQ-9 Depression Screening    PHQ-9:   Frequency of the following problems over the past two weeks:      Little interest or pleasure in doing things: 0 - not at all  Feeling down, depressed, or hopeless: 0 - not at all  PHQ-2 Score: 0        BMI Counseling: Body mass index is 27 02 kg/m²  The BMI is above normal  Nutrition recommendations include reducing portion sizes and 3-5 servings of fruits/vegetables daily  Exercise recommendations include moderate aerobic physical activity for 150 minutes/week and exercising 3-5 times per week  Subjective:      Patient ID: Segundo Juarez is a 50 y o  male  Pt her for annual physical      The following portions of the patient's history were reviewed and updated as appropriate: allergies, current medications, past family history, past medical history, past social history, past surgical history and problem list     Review of Systems   Constitutional: Negative  Negative for chills, fatigue and fever  HENT: Negative  Eyes: Negative  Respiratory: Negative for shortness of breath and wheezing  Cardiovascular: Negative for chest pain and palpitations     Gastrointestinal: Negative for abdominal pain, blood in stool, constipation, diarrhea, nausea and vomiting  Endocrine: Negative  Genitourinary: Negative for difficulty urinating and dysuria  Musculoskeletal: Negative for arthralgias and myalgias  Skin: Negative  Allergic/Immunologic: Negative  Neurological: Negative for seizures and syncope  Hematological: Negative for adenopathy  Psychiatric/Behavioral: Negative  Objective:    /76   Pulse 78   Temp (!) 97 2 °F (36 2 °C) (Tympanic)   Ht 5' 4" (1 626 m)   Wt 71 4 kg (157 lb 6 4 oz)   SpO2 98%   BMI 27 02 kg/m²      Physical Exam  Vitals signs and nursing note reviewed  Constitutional:       General: He is not in acute distress  Appearance: Normal appearance  He is well-developed  He is not ill-appearing, toxic-appearing or diaphoretic  HENT:      Head: Normocephalic and atraumatic  Right Ear: Tympanic membrane, ear canal and external ear normal  There is no impacted cerumen  Left Ear: Tympanic membrane, ear canal and external ear normal  There is no impacted cerumen  Nose: Nose normal  No congestion or rhinorrhea  Mouth/Throat:      Mouth: Mucous membranes are moist       Pharynx: No oropharyngeal exudate or posterior oropharyngeal erythema  Eyes:      General: No scleral icterus  Right eye: No discharge  Left eye: No discharge  Extraocular Movements: Extraocular movements intact  Conjunctiva/sclera: Conjunctivae normal       Pupils: Pupils are equal, round, and reactive to light  Neck:      Musculoskeletal: Normal range of motion and neck supple  No neck rigidity  Cardiovascular:      Rate and Rhythm: Normal rate and regular rhythm  Pulses: Normal pulses  Heart sounds: Normal heart sounds  No murmur  No friction rub  No gallop  Pulmonary:      Effort: Pulmonary effort is normal  No respiratory distress  Breath sounds: Normal breath sounds   No wheezing, rhonchi or rales    Abdominal:      General: Bowel sounds are normal  There is no distension  Palpations: Abdomen is soft  There is no mass  Tenderness: There is no abdominal tenderness  There is no guarding or rebound  Musculoskeletal: Normal range of motion  General: No swelling or deformity  Right lower leg: No edema  Left lower leg: No edema  Lymphadenopathy:      Cervical: No cervical adenopathy  Skin:     General: Skin is warm and dry  Findings: No rash  Neurological:      General: No focal deficit present  Mental Status: He is alert and oriented to person, place, and time  Sensory: No sensory deficit  Motor: No weakness  Coordination: Coordination normal       Gait: Gait normal       Deep Tendon Reflexes: Reflexes are normal and symmetric  Reflexes normal    Psychiatric:         Mood and Affect: Mood normal          Behavior: Behavior normal          Thought Content:  Thought content normal          Judgment: Judgment normal

## 2021-01-13 NOTE — PATIENT INSTRUCTIONS
January 13, 2021     Geetha Lee    Dear Mr Tino Salinas: Thank you for contacting us! Our commitment to you is to keep you informed  [place] to let you know when its your turn for the COVID-19 vaccine  Please go under your questionnaires tab in [employer] (health tab on the web, questionnaire button on the juan ) and complete the [employer] questionnaire  This will help us communicate with you when vaccine spots are available for your phase  Please let us know if you have any further questions or concerns  Sincerely,      [facility] Patient Technical Services Desk     Additional Information:  If you have questions, call [phone] ([phone]) choose option 5 to talk to our customer support staff  Remember, [employer] is NOT to be used for urgent needs  For medical emergencies, dial [phone]

## 2021-05-04 ENCOUNTER — OFFICE VISIT (OUTPATIENT)
Dept: URGENT CARE | Facility: CLINIC | Age: 56
End: 2021-05-04
Payer: COMMERCIAL

## 2021-05-04 VITALS
BODY MASS INDEX: 24.49 KG/M2 | OXYGEN SATURATION: 97 % | DIASTOLIC BLOOD PRESSURE: 77 MMHG | SYSTOLIC BLOOD PRESSURE: 118 MMHG | WEIGHT: 147 LBS | RESPIRATION RATE: 18 BRPM | TEMPERATURE: 98.3 F | HEIGHT: 65 IN | HEART RATE: 73 BPM

## 2021-05-04 DIAGNOSIS — Z02.4 DRIVER'S PERMIT PE (PHYSICAL EXAMINATION): Primary | ICD-10-CM

## 2021-05-04 NOTE — PROGRESS NOTES
[facility]        NAME: Michela Pennington is a 50 y o  male  : 1965    MRN: [id number]  DATE: May 4, 2021  TIME: 9:48 AM    Assessment and Plan   's permit PE (physical examination) [Z02 4]  1  's permit PE (physical examination)           Patient Instructions       Vision is 20/50 in both eyes  He will need vision correction  I gave him the report of eye examination form dL 102    Chief Complaint     Chief Complaint   Patient presents with    Annual Exam     Pt here for  permit physical         History of Present Illness         68-year-old male presents for 's permit physical   He does not wear glasses  No history of seizures or loss of conscious  No diabetes  Review of Systems   Review of Systems   Constitutional: Negative for chills and fever  HENT: Negative for congestion, ear pain, postnasal drip, rhinorrhea, sinus pressure, sinus pain and sore throat  Eyes: Negative for pain, redness and visual disturbance  Respiratory: Negative for cough, shortness of breath and wheezing  Cardiovascular: Negative for chest pain and palpitations  Gastrointestinal: Negative for abdominal pain, diarrhea, nausea and vomiting  Neurological: Negative for dizziness and headaches           Current Medications       Current Outpatient Medications:     cyclobenzaprine (FLEXERIL) 10 mg tablet, Take 1 tablet (10 mg total) by mouth 3 (three) times a day as needed for muscle spasms (Patient not taking: Reported on 2018 ), Disp: 30 tablet, Rfl: 2    diazepam (VALIUM) 5 mg tablet, Take 1 tablet (5 mg total) by mouth every 8 (eight) hours as needed for anxiety (Patient not taking: Reported on 2021), Disp: 30 tablet, Rfl: 1    neomycin-polymyxin-hydrocortisone (CORTISPORIN) 0 35%-10,000 units/mL-1% otic suspension, Administer 4 drops to the right ear every 6 (six) hours for 5 days, Disp: 10 mL, Rfl: 0    nystatin (MYCOSTATIN) cream, Apply topically 2 (two) times a day for 7 days, Disp: 30 g, Rfl: 1    nystatin-triamcinolone (MYCOLOG-II) cream, Apply topically 2 (two) times a day for 10 days, Disp: 120 g, Rfl: 1    oxyCODONE-acetaminophen (PERCOCET)  mg per tablet, Take 1 tablet by mouth every 4 (four) hours as needed for moderate pain, Disp: , Rfl:     predniSONE 20 mg tablet, Please take 3 -20 mg tabs X3 days , Please take 2 - 20mg tabs X 3 days, Please take 1-20 mg tab X1 days (Patient not taking: Reported on 12/7/2018 ), Disp: 16 tablet, Rfl: 0    sildenafil (REVATIO) 20 mg tablet, Take 1 tablet (20 mg total) by mouth 3 (three) times a day (Patient not taking: Reported on 12/26/2018 ), Disp: 10 tablet, Rfl: 1    valACYclovir (VALTREX) 1,000 mg tablet, Take 1 tablet (1,000 mg total) by mouth 2 (two) times a day for 5 days, Disp: 10 tablet, Rfl: 0    Current Allergies     Allergies as of 05/04/2021    (No Known Allergies)            The following portions of the patient's history were reviewed and updated as appropriate: allergies, current medications, past family history, past medical history, past social history, past surgical history and problem list      Past Medical History:   Diagnosis Date    Back pain        Past Surgical History:   Procedure Laterality Date    BACK SURGERY  09/2017    HERNIA REPAIR      INGUINAL    MASS EXCISION N/A 10/25/2017    Procedure: RIGHT BACK MASS EXCISION;  Surgeon: Tawanna Briscoe MD;  Location: [facility];  Service: General    NECK SURGERY         History reviewed  No pertinent family history  Medications have been verified  Objective   /77   Pulse 73   Temp 98 3 °F (36 8 °C) (Temporal)   Resp 18   Ht 5' 5" (1 651 m)   Wt 66 7 kg (147 lb)   SpO2 97%   BMI 24 46 kg/m²        Physical Exam     Physical Exam  Constitutional:       General: He is not in acute distress  Appearance: He is well-developed  HENT:      Head: Normocephalic and atraumatic     Eyes:      Conjunctiva/sclera: Conjunctivae normal    Cardiovascular:      Rate and Rhythm: Normal rate and regular rhythm  Pulmonary:      Effort: Pulmonary effort is normal       Breath sounds: Normal breath sounds  Skin:     General: Skin is warm and dry  Findings: No rash  Neurological:      Mental Status: He is alert and oriented to person, place, and time  Cranial Nerves: No cranial nerve deficit  Sensory: No sensory deficit        Coordination: Coordination normal       Gait: Gait normal

## 2021-06-28 DIAGNOSIS — F41.9 ANXIETY: ICD-10-CM

## 2021-06-28 DIAGNOSIS — R21 RASH: ICD-10-CM

## 2021-06-29 RX ORDER — DIAZEPAM 5 MG/1
5 TABLET ORAL EVERY 8 HOURS PRN
Qty: 30 TABLET | Refills: 1 | Status: SHIPPED | OUTPATIENT
Start: 2021-06-29

## 2022-02-02 ENCOUNTER — HOSPITAL ENCOUNTER (EMERGENCY)
Facility: HOSPITAL | Age: 57
Discharge: HOME | End: 2022-02-02
Attending: EMERGENCY MEDICINE
Payer: MEDICARE

## 2022-02-02 VITALS
DIASTOLIC BLOOD PRESSURE: 72 MMHG | TEMPERATURE: 97.5 F | WEIGHT: 147 LBS | OXYGEN SATURATION: 98 % | RESPIRATION RATE: 18 BRPM | SYSTOLIC BLOOD PRESSURE: 158 MMHG | BODY MASS INDEX: 24.46 KG/M2 | HEART RATE: 83 BPM

## 2022-02-02 DIAGNOSIS — Z20.822 COVID-19 VIRUS TEST RESULT UNKNOWN: ICD-10-CM

## 2022-02-02 DIAGNOSIS — R68.89 FLU-LIKE SYMPTOMS: Primary | ICD-10-CM

## 2022-02-02 LAB
FLUAV RNA RESP QL NAA+PROBE: NEGATIVE
FLUBV RNA RESP QL NAA+PROBE: NEGATIVE
GAS + CO PNL BLDA: 2 % (ref 0–1.5)
SARS-COV-2 AG UPPER RESP QL IA: NORMAL
SARS-COV-2 RNA RESP QL NAA+PROBE: NEGATIVE

## 2022-02-02 PROCEDURE — 99282 EMERGENCY DEPT VISIT SF MDM: CPT | Performed by: EMERGENCY MEDICINE

## 2022-02-02 PROCEDURE — 82375 ASSAY CARBOXYHB QUANT: CPT | Performed by: EMERGENCY MEDICINE

## 2022-02-02 PROCEDURE — 99283 EMERGENCY DEPT VISIT LOW MDM: CPT

## 2022-02-02 PROCEDURE — 87636 SARSCOV2 & INF A&B AMP PRB: CPT | Performed by: EMERGENCY MEDICINE

## 2022-02-02 PROCEDURE — 36415 COLL VENOUS BLD VENIPUNCTURE: CPT | Performed by: EMERGENCY MEDICINE

## 2022-02-02 NOTE — ED PROVIDER NOTES
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, states his symptoms began at the end of the day and have continued this morning  He denies fever, nausea, vomiting, diarrhea, trauma, changes in speech or vision, other symptoms  Patient states he smokes an occasional cigarette but does not smoke daily  HPI      Past Medical and Surgical History    Past Medical History:   Diagnosis Date    Back pain        Past Surgical History:   Procedure Laterality Date    BACK SURGERY  09/2017    HERNIA REPAIR      INGUINAL    MASS EXCISION N/A 10/25/2017    Procedure: RIGHT BACK MASS EXCISION;  Surgeon: Aleks Penaloza MD;  Location: [facility];  Service: General    NECK SURGERY         No family history on file  Social History     Tobacco Use    Smoking status: Former Smoker     Packs/day: 0 10    Smokeless tobacco: Never Used   Substance Use Topics    Alcohol use: Yes     Comment: socially    Drug use: No           Allergies    No Known Allergies    Home Medications    Prior to Admission medications    Medication Sig Start Date End Date Taking?  Authorizing Provider   cyclobenzaprine (FLEXERIL) 10 mg tablet Take 1 tablet (10 mg total) by mouth 3 (three) times a day as needed for muscle spasms  Patient not taking: Reported on 12/26/2018  7/3/18   HITESH Bowman   diazepam (VALIUM) 5 mg tablet Take 1 tablet (5 mg total) by mouth every 8 (eight) hours as needed for anxiety 6/29/21   Gale Carson, MD   neomycin-polymyxin-hydrocortisone (CORTISPORIN) 0 35%-10,000 units/mL-1% otic suspension Administer 4 drops to the right ear every 6 (six) hours for 5 days 1/2/19 1/7/19  Graciela Peterson PA-C   nystatin (MYCOSTATIN) cream Apply topically 2 (two) times a day for 7 days 1/13/21 1/20/21  Scott Mejia DO   nystatin-triamcinolone (MYCOLOG-II) cream Apply topically 2 (two) times a day for 10 days 6/29/21 7/9/21  Gale Carson, MD   oxyCODONE-acetaminophen (PERCOCET)  mg per tablet Take 1 tablet by mouth every 4 (four) hours as needed for moderate pain    Historical Provider, MD   predniSONE 20 mg tablet Please take 3 -20 mg tabs X3 days , Please take 2 - 20mg tabs X 3 days, Please take 1-20 mg tab X1 days  Patient not taking: Reported on 12/7/2018  7/3/18   Geoffrey Galeazzi, CRNP   sildenafil (REVATIO) 20 mg tablet Take 1 tablet (20 mg total) by mouth 3 (three) times a day  Patient not taking: Reported on 12/26/2018 12/7/18   Scott Mejia DO   valACYclovir (VALTREX) 1,000 mg tablet Take 1 tablet (1,000 mg total) by mouth 2 (two) times a day for 5 days 2/27/18 3/4/18  Scott Mejia DO           Review of Systems    Review of Systems   Constitutional: Positive for fatigue  Negative for appetite change, chills and diaphoresis  HENT: Negative for drooling, facial swelling, trouble swallowing and voice change  Respiratory: Negative for apnea, shortness of breath and wheezing  Cardiovascular: Negative for chest pain and leg swelling  Gastrointestinal: Negative for abdominal distention, abdominal pain, diarrhea, nausea and vomiting  Genitourinary: Negative for dysuria and urgency  Musculoskeletal: Positive for myalgias  Negative for arthralgias, back pain, gait problem and neck pain  Skin: Negative for color change, rash and wound  Neurological: Positive for headaches   Negative for seizures, speech difficulty and weakness  Psychiatric/Behavioral: Negative for agitation, behavioral problems and dysphoric mood  The patient is not nervous/anxious  All other systems reviewed and negative  Physical Exam      ED Triage Vitals [02/02/22 1420]   Temperature Pulse Respirations Blood Pressure SpO2   97 5 °F (36 4 °C) 83 18 158/72 98 %      Temp Source Heart Rate Source Patient Position - Orthostatic VS BP Location FiO2 (%)   Oral Monitor Sitting Left arm --      Pain Score       --               Physical Exam  Vitals and nursing note reviewed  Constitutional:       General: He is not in acute distress  Appearance: He is well-developed  He is not ill-appearing or toxic-appearing  HENT:      Head: Normocephalic and atraumatic  Right Ear: External ear normal       Left Ear: External ear normal    Eyes:      Conjunctiva/sclera: Conjunctivae normal       Pupils: Pupils are equal, round, and reactive to light  Neck:      Trachea: No tracheal deviation  Cardiovascular:      Rate and Rhythm: Normal rate and regular rhythm  Heart sounds: Normal heart sounds  No murmur heard  Pulmonary:      Effort: Pulmonary effort is normal  No respiratory distress  Breath sounds: Normal breath sounds  No stridor  No wheezing or rales  Abdominal:      General: There is no distension  Palpations: Abdomen is soft  Tenderness: There is no abdominal tenderness  There is no guarding or rebound  Musculoskeletal:         General: No deformity  Normal range of motion  Cervical back: Normal range of motion and neck supple  Skin:     General: Skin is warm and dry  Findings: No rash  Neurological:      Mental Status: He is alert and oriented to person, place, and time  Cranial Nerves: No cranial nerve deficit  Motor: No weakness  Coordination: Coordination normal    Psychiatric:         Behavior: Behavior normal          Thought Content:  Thought content normal  Judgment: Judgment normal               Diagnostic Results      Labs:    Results Reviewed     Procedure Component Value Units Date/Time    Carboxyhemoglobin [[id number]]  (Abnormal) Collected: 02/02/22 1536    Lab Status: Final result Specimen: Blood from Arm, Right Updated: 02/02/22 1543     Carbon Monoxide, Blood 2 0 %     Narrative: Therapeutic levels (1 mg/mL and 2 mg/mL) of hydroxocobalamin may interfere with the fCOHb and fMetHb where it may cause lower than expected values  Normal Carboxyhemoglobin range for nonsmokers is <1 5%   Normal Carboxyhemoglobin range for smokers is 1 5% to 5 1%     COVID-19 Rapid Antigen [[id number]]  (Normal) Collected: 02/02/22 1423    Lab Status: Final result Specimen: Nares from Nose Updated: 02/02/22 1441     SARS-CoV-2 Ag Negative Presumptive    Covid19 and INFLUENZA A/B PCR [[id number]] Collected: 02/02/22 1423    Lab Status: In process Specimen: Nares from Nose Updated: 02/02/22 1441          All labs reviewed and utilized in the medical decision making process    Radiology:    No orders to display       All radiology studies independently viewed by me and interpreted by the radiologist     Procedure    Procedures        ED Course of Care and Re-Assessments      Patient's symptoms resolved over the course of the ER stay  Medications - No data to display        FINAL IMPRESSION    Final diagnoses:   Flu-like symptoms   COVID-19 virus test result unknown         DISPOSITION/PLAN    Presentation as above with flu-like symptoms and possible carbon monoxide exposure  Vital signs reassuring, examination likewise reassuring  Carboxyhemoglobin obtained and not significantly elevated, there is a possibility patient had a more significant exposure yesterday that he has since cleared which would be consistent with his improving symptoms  The possibility of viral etiology was also discussed at some length with the patient  COVID-19 test sent and pending at this time    Patient not held in emergency department at this time as test is a send out and will not result within the next hour, and the result will not   Patient does not meet current hospital criteria for rapid testing or admission for novel coronavirus at this time based on vital signs, history, and clinical examination  The patient was counseled regarding the possibility of COVID-19 among other considerations in the differential diagnosis as above  Patient counseled extensively regarding conservative measures to take at home for symptomatic treatment, symptoms and reasons to return for further evaluation and care, and quarantine/isolation and other infection control measures  Patient indicated understanding of the above, all questions answered  Discharged with strict return precautions, follow up with primary care doctor  Time reflects when diagnosis was documented in both MDM as applicable and the Disposition within this note     Time User Action Codes Description Comment    2/2/2022  3:50 PM Dulcy Gift Add [R68 89] Flu-like symptoms     2/2/2022  3:50 PM Dulcy Gift Add [Z20 822] COVID-19 virus test result unknown       ED Disposition     ED Disposition Condition Date/Time Comment    Discharge Stable Wed Feb 2, 2022  3:50 PM Chapito Iyer discharge to home/self care  Follow-up Information     Follow up With Specialties Details Why Contact Info Additional [address] [facility] Emergency Department Emergency Medicine Go to  If symptoms worsen [address] [place] [id number] [facility] Emergency Department, [address], [place], [address] [place], DO Family Medicine Call in 1 day To discuss this visit and schedule close outpatient follow-up  [address]   [address] [place], [place] [id number] [phone]               PATIENT REFERRED TO:    [address] [place] [address] Emergency Department  [address] [place] [id number] [phone]  Go to   If symptoms worsen    Neptali Garrett DO  [facility] [address] [place], [place] [id number] [phone]    Call in 1 day  To discuss this visit and schedule close outpatient follow-up  DISCHARGE MEDICATIONS:    Patient's Medications   Discharge Prescriptions    No medications on file       No discharge procedures on file  Owen Echols MD    Portions of the record may have been created with voice recognition software  Occasional wrong word or "sound alike" substitutions may have occurred due to the inherent limitations of voice recognition software    Please read the chart carefully and recognize, using context, where substitutions have occurred     Owen Echols MD  02/02/22 9429

## 2022-02-02 NOTE — ED NOTES
Pt is discharged to home at this time  VSS  AVS given and pt expressed understanding        Kevin Ochoa, MARANDA  02/02/22 3924

## 2022-02-03 NOTE — RESULT ENCOUNTER NOTE
Patient aware of negative COVID-19 test results  He states he is feeling better  Recommend he follow-up PCP for any worsening symptoms

## 2022-02-12 ENCOUNTER — HOSPITAL ENCOUNTER (EMERGENCY)
Facility: HOSPITAL | Age: 57
Discharge: HOME | End: 2022-02-12
Attending: EMERGENCY MEDICINE | Admitting: EMERGENCY MEDICINE
Payer: MEDICARE

## 2022-02-12 ENCOUNTER — APPOINTMENT (EMERGENCY)
Dept: RADIOLOGY | Facility: HOSPITAL | Age: 57
End: 2022-02-12
Payer: MEDICARE

## 2022-02-12 VITALS
RESPIRATION RATE: 18 BRPM | SYSTOLIC BLOOD PRESSURE: 185 MMHG | TEMPERATURE: 98 F | WEIGHT: 167.55 LBS | DIASTOLIC BLOOD PRESSURE: 101 MMHG | BODY MASS INDEX: 27.92 KG/M2 | HEART RATE: 92 BPM | OXYGEN SATURATION: 99 % | HEIGHT: 65 IN

## 2022-02-12 DIAGNOSIS — S61.419A LACERATION OF HAND WITHOUT FOREIGN BODY, UNSPECIFIED LATERALITY, INITIAL ENCOUNTER: ICD-10-CM

## 2022-02-12 DIAGNOSIS — S69.90XA INJURY OF HAND, UNSPECIFIED LATERALITY, INITIAL ENCOUNTER: Primary | ICD-10-CM

## 2022-02-12 PROCEDURE — 73130 X-RAY EXAM OF HAND: CPT

## 2022-02-12 PROCEDURE — 99284 EMERGENCY DEPT VISIT MOD MDM: CPT | Performed by: EMERGENCY MEDICINE

## 2022-02-12 PROCEDURE — 99283 EMERGENCY DEPT VISIT LOW MDM: CPT

## 2022-02-12 PROCEDURE — 90471 IMMUNIZATION ADMIN: CPT

## 2022-02-12 PROCEDURE — 90715 TDAP VACCINE 7 YRS/> IM: CPT | Performed by: EMERGENCY MEDICINE

## 2022-02-12 RX ORDER — CEPHALEXIN 500 MG/1
500 CAPSULE ORAL 2 TIMES DAILY
Qty: 20 CAPSULE | Refills: 0 | Status: SHIPPED | OUTPATIENT
Start: 2022-02-12 | End: 2022-02-22

## 2022-02-12 RX ORDER — BACITRACIN, NEOMYCIN, POLYMYXIN B 400; 3.5; 5 [USP'U]/G; MG/G; [USP'U]/G
1 OINTMENT TOPICAL ONCE
Status: COMPLETED | OUTPATIENT
Start: 2022-02-12 | End: 2022-02-12

## 2022-02-12 RX ORDER — CEPHALEXIN 250 MG/1
500 CAPSULE ORAL ONCE
Status: COMPLETED | OUTPATIENT
Start: 2022-02-12 | End: 2022-02-12

## 2022-02-12 RX ADMIN — TETANUS TOXOID, REDUCED DIPHTHERIA TOXOID AND ACELLULAR PERTUSSIS VACCINE, ADSORBED 0.5 ML: 5; 2.5; 8; 8; 2.5 SUSPENSION INTRAMUSCULAR at 17:42

## 2022-02-12 RX ADMIN — CEPHALEXIN 500 MG: 250 CAPSULE ORAL at 17:41

## 2022-02-12 RX ADMIN — BACITRACIN, NEOMYCIN, POLYMYXIN B 1 SMALL APPLICATION: 400; 3.5; 5 OINTMENT TOPICAL at 18:18

## 2022-02-12 NOTE — ED NOTES
Discharged reviewed with pt  Pt verbalized understanding and has no further questions at this time  Pt ambulatory off unit with steady gait       Guerda Martínez, RN  02/12/22 2564

## 2022-02-12 NOTE — ED NOTES
Educated patient on wound care and provided with supplies for wound cleaning, patient expressed understanding and has no further questions at this time       Susi Millan RN  02/12/22 8407
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
## 2022-02-13 ENCOUNTER — HOSPITAL ENCOUNTER (EMERGENCY)
Facility: HOSPITAL | Age: 57
Discharge: HOME | End: 2022-02-13
Attending: EMERGENCY MEDICINE | Admitting: EMERGENCY MEDICINE
Payer: MEDICARE

## 2022-02-13 VITALS
WEIGHT: 160 LBS | OXYGEN SATURATION: 97 % | HEART RATE: 109 BPM | RESPIRATION RATE: 18 BRPM | HEIGHT: 65 IN | DIASTOLIC BLOOD PRESSURE: 80 MMHG | TEMPERATURE: 97.2 F | SYSTOLIC BLOOD PRESSURE: 134 MMHG | BODY MASS INDEX: 26.66 KG/M2

## 2022-02-13 DIAGNOSIS — L02.91 ABSCESS: ICD-10-CM

## 2022-02-13 DIAGNOSIS — L03.90 CELLULITIS: Primary | ICD-10-CM

## 2022-02-13 LAB
ALBUMIN SERPL BCP-MCNC: 4 G/DL (ref 3.5–5)
ALP SERPL-CCNC: 79 U/L (ref 46–116)
ALT SERPL W P-5'-P-CCNC: 23 U/L (ref 12–78)
ANION GAP SERPL CALCULATED.3IONS-SCNC: 11 MMOL/L (ref 4–13)
AST SERPL W P-5'-P-CCNC: 10 U/L (ref 5–45)
BASOPHILS # BLD AUTO: 0.03 THOUSANDS/ΜL (ref 0–0.1)
BASOPHILS NFR BLD AUTO: 0 % (ref 0–1)
BILIRUB SERPL-MCNC: 0.76 MG/DL (ref 0.2–1)
BUN SERPL-MCNC: 9 MG/DL (ref 5–25)
CALCIUM SERPL-MCNC: 9 MG/DL (ref 8.3–10.1)
CHLORIDE SERPL-SCNC: 100 MMOL/L (ref 100–108)
CO2 SERPL-SCNC: 25 MMOL/L (ref 21–32)
CREAT SERPL-MCNC: 0.64 MG/DL (ref 0.6–1.3)
EOSINOPHIL # BLD AUTO: 0.01 THOUSAND/ΜL (ref 0–0.61)
EOSINOPHIL NFR BLD AUTO: 0 % (ref 0–6)
ERYTHROCYTE [DISTWIDTH] IN BLOOD BY AUTOMATED COUNT: 12.5 % (ref 11.6–15.1)
GFR SERPL CREATININE-BSD FRML MDRD: 111 ML/MIN/1.73SQ M
GLUCOSE SERPL-MCNC: 107 MG/DL (ref 65–140)
HCT VFR BLD AUTO: 41.9 % (ref 36.5–49.3)
HGB BLD-MCNC: 14.4 G/DL (ref 12–17)
IMM GRANULOCYTES # BLD AUTO: 0.04 THOUSAND/UL (ref 0–0.2)
IMM GRANULOCYTES NFR BLD AUTO: 0 % (ref 0–2)
LYMPHOCYTES # BLD AUTO: 1.51 THOUSANDS/ΜL (ref 0.6–4.47)
LYMPHOCYTES NFR BLD AUTO: 12 % (ref 14–44)
MCH RBC QN AUTO: 29.8 PG (ref 26.8–34.3)
MCHC RBC AUTO-ENTMCNC: 34.4 G/DL (ref 31.4–37.4)
MCV RBC AUTO: 87 FL (ref 82–98)
MONOCYTES # BLD AUTO: 0.75 THOUSAND/ΜL (ref 0.17–1.22)
MONOCYTES NFR BLD AUTO: 6 % (ref 4–12)
NEUTROPHILS # BLD AUTO: 9.88 THOUSANDS/ΜL (ref 1.85–7.62)
NEUTS SEG NFR BLD AUTO: 82 % (ref 43–75)
NRBC BLD AUTO-RTO: 0 /100 WBCS
PLATELET # BLD AUTO: 325 THOUSANDS/UL (ref 149–390)
PMV BLD AUTO: 8.7 FL (ref 8.9–12.7)
POTASSIUM SERPL-SCNC: 4.2 MMOL/L (ref 3.5–5.3)
PROT SERPL-MCNC: 7.4 G/DL (ref 6.4–8.2)
RBC # BLD AUTO: 4.83 MILLION/UL (ref 3.88–5.62)
SODIUM SERPL-SCNC: 136 MMOL/L (ref 136–145)
WBC # BLD AUTO: 12.22 THOUSAND/UL (ref 4.31–10.16)

## 2022-02-13 PROCEDURE — 36415 COLL VENOUS BLD VENIPUNCTURE: CPT | Performed by: EMERGENCY MEDICINE

## 2022-02-13 PROCEDURE — 99285 EMERGENCY DEPT VISIT HI MDM: CPT | Performed by: EMERGENCY MEDICINE

## 2022-02-13 PROCEDURE — 96375 TX/PRO/DX INJ NEW DRUG ADDON: CPT

## 2022-02-13 PROCEDURE — 96376 TX/PRO/DX INJ SAME DRUG ADON: CPT

## 2022-02-13 PROCEDURE — 96365 THER/PROPH/DIAG IV INF INIT: CPT

## 2022-02-13 PROCEDURE — 85025 COMPLETE CBC W/AUTO DIFF WBC: CPT | Performed by: EMERGENCY MEDICINE

## 2022-02-13 PROCEDURE — 96366 THER/PROPH/DIAG IV INF ADDON: CPT

## 2022-02-13 PROCEDURE — 99283 EMERGENCY DEPT VISIT LOW MDM: CPT

## 2022-02-13 PROCEDURE — 80053 COMPREHEN METABOLIC PANEL: CPT | Performed by: EMERGENCY MEDICINE

## 2022-02-13 PROCEDURE — 10060 I&D ABSCESS SIMPLE/SINGLE: CPT | Performed by: EMERGENCY MEDICINE

## 2022-02-13 RX ORDER — MORPHINE SULFATE 4 MG/ML
4 INJECTION, SOLUTION INTRAMUSCULAR; INTRAVENOUS ONCE
Status: COMPLETED | OUTPATIENT
Start: 2022-02-13 | End: 2022-02-13

## 2022-02-13 RX ORDER — MORPHINE SULFATE 15 MG/1
15 TABLET ORAL EVERY 4 HOURS PRN
Qty: 10 TABLET | Refills: 0 | Status: SHIPPED | OUTPATIENT
Start: 2022-02-13

## 2022-02-13 RX ORDER — CLINDAMYCIN PHOSPHATE 600 MG/50ML
600 INJECTION INTRAVENOUS ONCE
Status: COMPLETED | OUTPATIENT
Start: 2022-02-13 | End: 2022-02-13

## 2022-02-13 RX ORDER — AMOXICILLIN AND CLAVULANATE POTASSIUM 875; 125 MG/1; MG/1
1 TABLET, FILM COATED ORAL EVERY 12 HOURS
Qty: 20 TABLET | Refills: 0 | Status: SHIPPED | OUTPATIENT
Start: 2022-02-13 | End: 2022-02-16 | Stop reason: SDUPTHER

## 2022-02-13 RX ORDER — AMOXICILLIN AND CLAVULANATE POTASSIUM 875; 125 MG/1; MG/1
1 TABLET, FILM COATED ORAL ONCE
Status: COMPLETED | OUTPATIENT
Start: 2022-02-13 | End: 2022-02-13

## 2022-02-13 RX ORDER — LIDOCAINE HYDROCHLORIDE 20 MG/ML
10 INJECTION, SOLUTION EPIDURAL; INFILTRATION; INTRACAUDAL; PERINEURAL ONCE
Status: COMPLETED | OUTPATIENT
Start: 2022-02-13 | End: 2022-02-13

## 2022-02-13 RX ADMIN — CLINDAMYCIN PHOSPHATE 600 MG: 600 INJECTION, SOLUTION INTRAVENOUS at 11:56

## 2022-02-13 RX ADMIN — AMOXICILLIN AND CLAVULANATE POTASSIUM 1 TABLET: 875; 125 TABLET, FILM COATED ORAL at 13:53

## 2022-02-13 RX ADMIN — MORPHINE SULFATE 4 MG: 4 INJECTION, SOLUTION INTRAMUSCULAR; INTRAVENOUS at 13:53

## 2022-02-13 RX ADMIN — MORPHINE SULFATE 4 MG: 4 INJECTION INTRAVENOUS at 11:56

## 2022-02-13 RX ADMIN — LIDOCAINE HYDROCHLORIDE 10 ML: 20 INJECTION, SOLUTION EPIDURAL; INFILTRATION; INTRACAUDAL at 12:13

## 2022-02-13 NOTE — ED PROVIDER NOTES
History  Chief Complaint   Patient presents with    Hand Laceration     Right hand cut on glass when washing it Thursday and left hand infection (seen here yesterday)     Patient is a 82-year-old male past medical history of anxiety presenting with left 4th digit infection  Patient was seen here yesterday for laceration to the left 4th digit that he obtained 2 days ago when the bleed of a fan  He states that he was diagnosed with infection and started on antibiotics and states he has taken 4 doses with worsening of his condition  Notes worsening constant throbbing pain which is worse with movement or pressure and radiates up his arm since 7:00 p m  Last night, roughly 16 hours  He states that it was not improved with alternating Tylenol and Motrin, last dose prior to arrival   He denies any fevers or purulent drainage and denies any chest pain, shortness breath, dizziness, nausea vomiting, rashes, vision changes, dysuria  Notes some numbness and tingling to the area  States that he was unable to wash the area secondary to severe pain  States the lacerations obtain to the right hand are unchanged  Prior to Admission Medications   Prescriptions Last Dose Informant Patient Reported? Taking?    cephalexin (KEFLEX) 500 mg capsule   No Yes   Sig: Take 1 capsule (500 mg total) by mouth 2 (two) times a day for 10 days   cyclobenzaprine (FLEXERIL) 10 mg tablet Not Taking at Unknown time  No No   Sig: Take 1 tablet (10 mg total) by mouth 3 (three) times a day as needed for muscle spasms   Patient not taking: Reported on 12/26/2018    diazepam (VALIUM) 5 mg tablet   No Yes   Sig: Take 1 tablet (5 mg total) by mouth every 8 (eight) hours as needed for anxiety   neomycin-polymyxin-hydrocortisone (CORTISPORIN) 0 35%-10,000 units/mL-1% otic suspension   No No   Sig: Administer 4 drops to the right ear every 6 (six) hours for 5 days   nystatin (MYCOSTATIN) cream   No No   Sig: Apply topically 2 (two) times a day for 7 days   nystatin-triamcinolone (MYCOLOG-II) cream   No No   Sig: Apply topically 2 (two) times a day for 10 days   oxyCODONE-acetaminophen (PERCOCET)  mg per tablet   Yes Yes   Sig: Take 1 tablet by mouth every 4 (four) hours as needed for moderate pain   predniSONE 20 mg tablet Not Taking at Unknown time  No No   Sig: Please take 3 -20 mg tabs X3 days , Please take 2 - 20mg tabs X 3 days, Please take 1-20 mg tab X1 days   Patient not taking: Reported on 12/7/2018    sildenafil (REVATIO) 20 mg tablet Not Taking at Unknown time  No No   Sig: Take 1 tablet (20 mg total) by mouth 3 (three) times a day   Patient not taking: Reported on 12/26/2018    valACYclovir (VALTREX) 1,000 mg tablet   No No   Sig: Take 1 tablet (1,000 mg total) by mouth 2 (two) times a day for 5 days      Facility-Administered Medications: None       Past Medical History:   Diagnosis Date    Back pain        Past Surgical History:   Procedure Laterality Date    BACK SURGERY  09/2017    HERNIA REPAIR      INGUINAL    MASS EXCISION N/A 10/25/2017    Procedure: RIGHT BACK MASS EXCISION;  Surgeon: Tawanna Briscoe MD;  Location: [facility] OR;  Service: General    NECK SURGERY         History reviewed  No pertinent family history  I have reviewed and agree with the history as documented  E-Cigarette/Vaping     E-Cigarette/Vaping Substances     Social History     Tobacco Use    Smoking status: Former Smoker     Packs/day: 0 10    Smokeless tobacco: Never Used   Substance Use Topics    Alcohol use: Yes     Comment: socially    Drug use: No       Review of Systems   All other systems reviewed and are negative  Physical Exam  Physical Exam  Vitals reviewed  Constitutional:       General: He is not in acute distress  Appearance: Normal appearance  He is not ill-appearing     HENT:      Mouth/Throat:      Mouth: Mucous membranes are moist    Eyes:      Conjunctiva/sclera: Conjunctivae normal    Cardiovascular:      Rate and Rhythm: Normal rate and regular rhythm  Heart sounds: Normal heart sounds  Pulmonary:      Effort: Pulmonary effort is normal       Breath sounds: Normal breath sounds  Abdominal:      General: Abdomen is flat  Musculoskeletal:         General: Swelling and tenderness present  Cervical back: Neck supple  Comments: Patient has laceration which is appropriately approximated and appears to be healing to the dorsal aspect of the right 4th digit distally adjacent to the D IP with moderate edema, tenderness, erythema, some fluctuance deep to the laceration as well as to the distal pulp, unable to fully flex or extend D IP but with no tenderness from PIP proximally and able to flex and extend at the MCP   Skin:     General: Skin is warm and dry  Capillary Refill: Capillary refill takes less than 2 seconds  Neurological:      General: No focal deficit present  Mental Status: He is alert  Sensory: No sensory deficit     Psychiatric:         Mood and Affect: Mood normal          Vital Signs  ED Triage Vitals   Temperature Pulse Respirations Blood Pressure SpO2   02/13/22 1112 02/13/22 1112 02/13/22 1112 02/13/22 1112 02/13/22 1112   (!) 97 2 °F (36 2 °C) (!) 109 18 134/80 97 %      Temp Source Heart Rate Source Patient Position - Orthostatic VS BP Location FiO2 (%)   02/13/22 1112 02/13/22 1112 02/13/22 1112 02/13/22 1112 --   Oral Monitor Sitting Left arm       Pain Score       02/13/22 1156       8           Vitals:    02/13/22 1112   BP: 134/80   Pulse: (!) 109   Patient Position - Orthostatic VS: Sitting         Visual Acuity      ED Medications  Medications   morphine (PF) 4 mg/mL injection 4 mg (4 mg Intravenous Given 2/13/22 1156)   clindamycin (CLEOCIN) IVPB (premix in dextrose) 600 mg 50 mL (0 mg Intravenous Stopped 2/13/22 1354)   lidocaine (PF) (XYLOCAINE-MPF) 2 % injection 10 mL (10 mL Infiltration Given 2/13/22 1213)   amoxicillin-clavulanate (AUGMENTIN) 875-125 mg per tablet 1 tablet (1 tablet Oral Given 2/13/22 1353)   morphine (PF) 4 mg/mL injection 4 mg (4 mg Intravenous Given 2/13/22 1353)       Diagnostic Studies  Results Reviewed     Procedure Component Value Units Date/Time    Comprehensive metabolic panel [[id number]] Collected: 02/13/22 1155    Lab Status: Final result Specimen: Blood from Arm, Right Updated: 02/13/22 1222     Sodium 136 mmol/L      Potassium 4 2 mmol/L      Chloride 100 mmol/L      CO2 25 mmol/L      ANION GAP 11 mmol/L      BUN 9 mg/dL      Creatinine 0 64 mg/dL      Glucose 107 mg/dL      Calcium 9 0 mg/dL      AST 10 U/L      ALT 23 U/L      Alkaline Phosphatase 79 U/L      Total Protein 7 4 g/dL      Albumin 4 0 g/dL      Total Bilirubin 0 76 mg/dL      eGFR 111 ml/min/1 73sq m     Narrative:      National Kidney Disease Foundation guidelines for Chronic Kidney Disease (CKD):     Stage 1 with normal or high GFR (GFR > 90 mL/min/1 73 square meters)    Stage 2 Mild CKD (GFR = 60-89 mL/min/1 73 square meters)    Stage 3A Moderate CKD (GFR = 45-59 mL/min/1 73 square meters)    Stage 3B Moderate CKD (GFR = 30-44 mL/min/1 73 square meters)    Stage 4 Severe CKD (GFR = 15-29 mL/min/1 73 square meters)    Stage 5 End Stage CKD (GFR <15 mL/min/1 73 square meters)  Note: GFR calculation is accurate only with a steady state creatinine    CBC and differential [[id number]]  (Abnormal) Collected: 02/13/22 1155    Lab Status: Final result Specimen: Blood from Arm, Right Updated: 02/13/22 1205     WBC 12 22 Thousand/uL      RBC 4 83 Million/uL      Hemoglobin 14 4 g/dL      Hematocrit 41 9 %      MCV 87 fL      MCH 29 8 pg      MCHC 34 4 g/dL      RDW 12 5 %      MPV 8 7 fL      Platelets 187 Thousands/uL      nRBC 0 /100 WBCs      Neutrophils Relative 82 %      Immat GRANS % 0 %      Lymphocytes Relative 12 %      Monocytes Relative 6 %      Eosinophils Relative 0 %      Basophils Relative 0 %      Neutrophils Absolute 9 88 Thousands/µL      Immature Grans Absolute 0 04 Thousand/uL      Lymphocytes Absolute 1 51 Thousands/µL      Monocytes Absolute 0 75 Thousand/µL      Eosinophils Absolute 0 01 Thousand/µL      Basophils Absolute 0 03 Thousands/µL                  No orders to display              Procedures  Incision and drain    Date/Time: 2/13/2022 2:24 PM  Performed by: Eusebio Eisenmenger, DO  Authorized by: Eusebio Eisenmenger, DO   Universal Protocol:  Consent: Verbal consent obtained  Consent given by: patient  Patient understanding: patient states understanding of the procedure being performed  Patient consent: the patient's understanding of the procedure matches consent given  Procedure consent: procedure consent matches procedure scheduled  Relevant documents: relevant documents present and verified  Test results: test results available and properly labeled  Site marked: the operative site was marked  Radiology Images displayed and confirmed  If images not available, report reviewed: imaging studies available  Patient identity confirmed: verbally with patient      Patient location:  ED  Location:     Type:  Abscess    Location:  Upper extremity    Upper extremity location:  L thumb  Pre-procedure details:     Skin preparation:  Antiseptic wash  Anesthesia (see MAR for exact dosages):      Anesthesia method:  Nerve block    Block location:  Digital    Block anesthetic:  Lidocaine 2% w/o epi    Block injection procedure:  Anatomic landmarks identified, introduced needle, incremental injection and negative aspiration for blood    Block outcome:  Anesthesia achieved  Procedure details:     Complexity:  Simple    Incision types:  Elliptical and other (comment)    Scalpel blade:  11    Approach:  Open    Incision depth:  Deep    Wound management:  Probed and deloculated    Drainage:  Bloody and purulent    Drainage amount:  Scant    Wound treatment:  Wound left open    Packing materials:  None  Comments:      Incision made proximal to the cuticle and extending into the pulp of the finger to drain paronychia and felon, moderate blood, scant purulent drainage             ED Course  ED Course as of 02/13/22 1426   Sun Feb 13, 2022   1206 Have sent pictures to Hand surgery who states that they agree with plan to open original incision and extend to pulp to drain subcutaneous abscess/felon, give 1 dose IV antibiotics and discharged on outpatient antibiotics with hand surgery follow-up in office  SBIRT 20yo+      Most Recent Value   SBIRT (22 yo +)    In order to provide better care to our patients, we are screening all of our patients for alcohol and drug use  Would it be okay to ask you these screening questions? Yes Filed at: 02/13/2022 1400   Initial Alcohol Screen: US AUDIT-C     1  How often do you have a drink containing alcohol? 0 Filed at: 02/13/2022 1400   2  How many drinks containing alcohol do you have on a typical day you are drinking? 0 Filed at: 02/13/2022 1400   3a  Male UNDER 65: How often do you have five or more drinks on one occasion? 0 Filed at: 02/13/2022 1400   3b  FEMALE Any Age, or MALE 65+: How often do you have 4 or more drinks on one occassion? 0 Filed at: 02/13/2022 1400   Audit-C Score 0 Filed at: 02/13/2022 1400   MARYLU: How many times in the past year have you    Used an illegal drug or used a prescription medication for non-medical reasons? Never Filed at: 02/13/2022 1400                    MDM  Number of Diagnoses or Management Options  Diagnosis management comments: Patient is a 53-year-old male past medical history of anxiety presenting with right 4th digit infection  Patient is well-appearing at bedside though with low-grade tachycardia but in no acute distress  He is very tender most so to the distal 4th digit distal to the PIP with intact range of motion at the MCP however limited at the PIP and severely limited D IP    He has significant edema, tenderness, erythema from D IP distally and with some fluctuance surrounding the original laceration as well as to the distal pulp  He has no crepitus  Do not suspect flexor tenosynovitis as he has no tenderness palpation along the flexor sheath proximal to the PIP however will obtain labs, give pain control and IV antibiotics, clindamycin for MRSA coverage, and will discuss with hand surgery  Disposition  Final diagnoses:   Cellulitis   Abscess     Time reflects when diagnosis was documented in both MDM as applicable and the Disposition within this note     Time User Action Codes Description Comment    2/13/2022  1:45 PM Alejandrina Reddyo Add [L03 90] Cellulitis     2/13/2022  1:46 PM Alejandrina Reddyo Add [L02 91] Abscess       ED Disposition     ED Disposition Condition Date/Time Comment    Discharge Stable Sun Feb 13, 2022  1:45 PM Chelle Mcclure discharge to home/self care              Follow-up Information     Follow up With Specialties Details Why Contact Info    Phillip Friday, MD Orthopedic Surgery, Hand Surgery Schedule an appointment as soon as possible for a visit in 3 days  [address]  [phone]            Discharge Medication List as of 2/13/2022  1:47 PM      START taking these medications    Details   amoxicillin-clavulanate (AUGMENTIN) 875-125 mg per tablet Take 1 tablet by mouth every 12 (twelve) hours for 10 days, Starting Sun 2/13/2022, Until Wed 2/23/2022, Normal      morphine (MSIR) 15 mg tablet Take 1 tablet (15 mg total) by mouth every 4 (four) hours as needed for severe pain for up to 10 doses Max Daily Amount: 90 mg, Starting Sun 2/13/2022, Print         CONTINUE these medications which have NOT CHANGED    Details   cephalexin (KEFLEX) 500 mg capsule Take 1 capsule (500 mg total) by mouth 2 (two) times a day for 10 days, Starting Sat 2/12/2022, Until Tue 2/22/2022, Normal      diazepam (VALIUM) 5 mg tablet Take 1 tablet (5 mg total) by mouth every 8 (eight) hours as needed for anxiety, Starting Tue 6/29/2021, Normal      oxyCODONE-acetaminophen (PERCOCET)  mg per tablet Take 1 tablet by mouth every 4 (four) hours as needed for moderate pain, Historical Med      cyclobenzaprine (FLEXERIL) 10 mg tablet Take 1 tablet (10 mg total) by mouth 3 (three) times a day as needed for muscle spasms, Starting Tue 7/3/2018, Normal      neomycin-polymyxin-hydrocortisone (CORTISPORIN) 0 35%-10,000 units/mL-1% otic suspension Administer 4 drops to the right ear every 6 (six) hours for 5 days, Starting Wed 1/2/2019, Until Mon 1/7/2019, Normal      nystatin (MYCOSTATIN) cream Apply topically 2 (two) times a day for 7 days, Starting Wed 1/13/2021, Until Wed 1/20/2021, Normal      nystatin-triamcinolone (MYCOLOG-II) cream Apply topically 2 (two) times a day for 10 days, Starting Tue 6/29/2021, Until Fri 7/9/2021, Normal      predniSONE 20 mg tablet Please take 3 -20 mg tabs X3 days , Please take 2 - 20mg tabs X 3 days, Please take 1-20 mg tab X1 days, Normal      sildenafil (REVATIO) 20 mg tablet Take 1 tablet (20 mg total) by mouth 3 (three) times a day, Starting Fri 12/7/2018, Normal      valACYclovir (VALTREX) 1,000 mg tablet Take 1 tablet (1,000 mg total) by mouth 2 (two) times a day for 5 days, Starting Tue 2/27/2018, Until Sun 3/4/2018, Normal             No discharge procedures on file      PDMP Review     None          ED Provider  Electronically Signed by           Stefanie Edge DO  02/13/22 4366

## 2022-02-14 ENCOUNTER — TELEPHONE (OUTPATIENT)
Dept: OBGYN CLINIC | Facility: HOSPITAL | Age: 57
End: 2022-02-14

## 2022-02-14 NOTE — TELEPHONE ENCOUNTER
EMAIL SENT TO [place]:    Hello,  Please advise if the following patient can be forced onto the schedule:  Patient:  Rachid Garcia  :  69  MRN:  [id number]  INSURANCE: [employer]  Call back #:   [phone]  Reason for appointment:  Patient was told by ED to call Dr Sola Day to be seen asap for infection of the rt hand 4th digit  Requested doctor/location:  Dr Sola Day or Dr Arpit Diaz  Either office  Thank you in advance!

## 2022-02-16 ENCOUNTER — LAB REQUISITION (OUTPATIENT)
Dept: LAB | Facility: HOSPITAL | Age: 57
End: 2022-02-16
Payer: MEDICARE

## 2022-02-16 ENCOUNTER — OFFICE VISIT (OUTPATIENT)
Dept: OBGYN CLINIC | Facility: HOSPITAL | Age: 57
End: 2022-02-16
Payer: MEDICARE

## 2022-02-16 VITALS
BODY MASS INDEX: 26.66 KG/M2 | HEART RATE: 112 BPM | WEIGHT: 160 LBS | SYSTOLIC BLOOD PRESSURE: 117 MMHG | HEIGHT: 65 IN | DIASTOLIC BLOOD PRESSURE: 80 MMHG

## 2022-02-16 DIAGNOSIS — L08.9 LOCAL INFECTION OF THE SKIN AND SUBCUTANEOUS TISSUE, UNSPECIFIED: ICD-10-CM

## 2022-02-16 DIAGNOSIS — L08.9 FINGER INFECTION: Primary | ICD-10-CM

## 2022-02-16 DIAGNOSIS — L03.90 CELLULITIS: ICD-10-CM

## 2022-02-16 PROCEDURE — 87077 CULTURE AEROBIC IDENTIFY: CPT | Performed by: ORTHOPAEDIC SURGERY

## 2022-02-16 PROCEDURE — 87205 SMEAR GRAM STAIN: CPT | Performed by: ORTHOPAEDIC SURGERY

## 2022-02-16 PROCEDURE — 87186 SC STD MICRODIL/AGAR DIL: CPT | Performed by: ORTHOPAEDIC SURGERY

## 2022-02-16 PROCEDURE — 26010 DRAINAGE OF FINGER ABSCESS: CPT | Performed by: ORTHOPAEDIC SURGERY

## 2022-02-16 PROCEDURE — 87070 CULTURE OTHR SPECIMN AEROBIC: CPT | Performed by: ORTHOPAEDIC SURGERY

## 2022-02-16 PROCEDURE — 99204 OFFICE O/P NEW MOD 45 MIN: CPT | Performed by: ORTHOPAEDIC SURGERY

## 2022-02-16 RX ORDER — AMOXICILLIN AND CLAVULANATE POTASSIUM 875; 125 MG/1; MG/1
1 TABLET, FILM COATED ORAL EVERY 12 HOURS
Qty: 20 TABLET | Refills: 0 | Status: SHIPPED | OUTPATIENT
Start: 2022-02-16 | End: 2022-02-26

## 2022-02-16 NOTE — PROGRESS NOTES
ASSESSMENT/PLAN:    Assessment:   Left ring finger infection    Plan:   Patient instructed to switch back to the Augmentin new prescription sent to pharmacy on file  S/p I&D of the left ring finger with 1/4 inch iodoform packing and laceration repair  Packing out in two days and start three times daily soapy water soaks    Follow Up:  1  week(s)    To Do Next Visit:  recheck    _____________________________________________________  CHIEF COMPLAINT:  Chief Complaint   Patient presents with    Right Hand - Laceration, Infection     XR 2/12/22    Left Hand - Pain, Infection     Left ring finger          SUBJECTIVE:  Tasneem Guaman is a 49 y o  male who presents left ring finger infection  Patient has had 2 injurys, one to his right hand MP laceration sustained from cleaning a glass and cutting it on a broken glass and the second done 2 days ago was cut left dorsal 4th digit by DIP after it got hit on a fan blade  He was seen in the ED and initially placed on Augmentin then switched to Keflex the following day  PAST MEDICAL HISTORY:  Past Medical History:   Diagnosis Date    Back pain        PAST SURGICAL HISTORY:  Past Surgical History:   Procedure Laterality Date    BACK SURGERY  09/2017    HERNIA REPAIR      INGUINAL    MASS EXCISION N/A 10/25/2017    Procedure: RIGHT BACK MASS EXCISION;  Surgeon: Jaxon Cabrera MD;  [facility];  Service: General    NECK SURGERY         FAMILY HISTORY:  History reviewed  No pertinent family history      SOCIAL HISTORY:  Social History     Tobacco Use    Smoking status: Former Smoker     Packs/day: 0 10    Smokeless tobacco: Never Used   Substance Use Topics    Alcohol use: Yes     Comment: socially    Drug use: No       MEDICATIONS:    Current Outpatient Medications:     amoxicillin-clavulanate (AUGMENTIN) 875-125 mg per tablet, Take 1 tablet by mouth every 12 (twelve) hours for 10 days, Disp: 20 tablet, Rfl: 0    cephalexin (KEFLEX) 500 mg capsule, Take 1 capsule (500 mg total) by mouth 2 (two) times a day for 10 days, Disp: 20 capsule, Rfl: 0    diazepam (VALIUM) 5 mg tablet, Take 1 tablet (5 mg total) by mouth every 8 (eight) hours as needed for anxiety, Disp: 30 tablet, Rfl: 1    morphine (MSIR) 15 mg tablet, Take 1 tablet (15 mg total) by mouth every 4 (four) hours as needed for severe pain for up to 10 doses Max Daily Amount: 90 mg, Disp: 10 tablet, Rfl: 0    predniSONE 20 mg tablet, Please take 3 -20 mg tabs X3 days , Please take 2 - 20mg tabs X 3 days, Please take 1-20 mg tab X1 days, Disp: 16 tablet, Rfl: 0    cyclobenzaprine (FLEXERIL) 10 mg tablet, Take 1 tablet (10 mg total) by mouth 3 (three) times a day as needed for muscle spasms (Patient not taking: Reported on 12/26/2018 ), Disp: 30 tablet, Rfl: 2    neomycin-polymyxin-hydrocortisone (CORTISPORIN) 0 35%-10,000 units/mL-1% otic suspension, Administer 4 drops to the right ear every 6 (six) hours for 5 days, Disp: 10 mL, Rfl: 0    nystatin (MYCOSTATIN) cream, Apply topically 2 (two) times a day for 7 days, Disp: 30 g, Rfl: 1    nystatin-triamcinolone (MYCOLOG-II) cream, Apply topically 2 (two) times a day for 10 days, Disp: 120 g, Rfl: 1    oxyCODONE-acetaminophen (PERCOCET)  mg per tablet, Take 1 tablet by mouth every 4 (four) hours as needed for moderate pain (Patient not taking: Reported on 2/16/2022 ), Disp: , Rfl:     sildenafil (REVATIO) 20 mg tablet, Take 1 tablet (20 mg total) by mouth 3 (three) times a day (Patient not taking: Reported on 2/16/2022 ), Disp: 10 tablet, Rfl: 1    valACYclovir (VALTREX) 1,000 mg tablet, Take 1 tablet (1,000 mg total) by mouth 2 (two) times a day for 5 days, Disp: 10 tablet, Rfl: 0    ALLERGIES:  No Known Allergies    REVIEW OF SYSTEMS:  Pertinent items are noted in HPI  A comprehensive review of systems was negative      LABS:  HgA1c: No results found for: HGBA1C  BMP:   Lab Results   Component Value Date    CALCIUM 9 0 02/13/2022    K 4 2 02/13/2022    CO2 25 02/13/2022     02/13/2022    BUN 9 02/13/2022    CREATININE 0 64 02/13/2022         _____________________________________________________  PHYSICAL EXAMINATION:  Vital signs: /80   Pulse (!) 112   Ht 5' 5" (1 651 m)   Wt 72 6 kg (160 lb)   BMI 26 63 kg/m²   General: well developed and well nourished, alert, oriented times 3 and appears comfortable  Psychiatric: Normal  HEENT: Trachea Midline, No torticollis  Cardiovascular: No discernable arrhythmia  Pulmonary: No wheezing or stridor  Abdomen: No rebound or guarding  Extremities: No peripheral edema  Skin: No Masses  Neurovascular: Sensation Intact to the Median, Ulnar, Radial Nerve, Motor Intact to the Median, Ulnar, Radial Nerve and Pulses Intact    MUSCULOSKELETAL EXAMINATION:  Right hand: U shaped laceration ulnar aspect of the small metacarpal    Left hand:  Ring finger diffuse swelling and erythema middle phalanx  Dorsal fluctuance  Able to flex and extend at left ring finger DIP  < 2 sec cap refill    _____________________________________________________  STUDIES REVIEWED:  Images were reviewed in PACS by Dr Cristofer Peguero and demonstrate: bilateral hand x-rays demonstrates no fracture or dislocation       PROCEDURES PERFORMED:  Incision and drain    Date/Time: 2/16/2022 3:57 PM  Performed by: Damaris Marie MD  Authorized by: Damaris Marie MD   Universal Protocol:  Consent: Verbal consent obtained  Risks and benefits: risks, benefits and alternatives were discussed  Consent given by: patient  Time out: Immediately prior to procedure a "time out" was called to verify the correct patient, procedure, equipment, support staff and site/side marked as required    Patient understanding: patient states understanding of the procedure being performed  Patient consent: the patient's understanding of the procedure matches consent given  Procedure consent: procedure consent matches procedure scheduled  Relevant documents: relevant documents present and verified  Test results: test results available and properly labeled  Site marked: the operative site was marked  Radiology Images displayed and confirmed  If images not available, report reviewed: imaging studies available  Required items: required blood products, implants, devices, and special equipment available  Patient identity confirmed: verbally with patient      Location:     Type:  Abscess    Location:  Upper extremity    Upper extremity location:  L ring finger  Pre-procedure details:     Skin preparation:  Betadine  Anesthesia (see MAR for exact dosages): Anesthesia method:  Local infiltration    Local anesthetic:  Lidocaine 1% w/o epi  Procedure details:     Complexity:  Simple    Needle aspiration: no      Incision types:  Single straight    Scalpel blade:  15    Approach:  Open    Incision depth:  Subcutaneous    Wound management:  Probed and deloculated, irrigated with saline and debrided    Irrigation with saline:  1 L    Drainage:  Bloody and purulent    Drainage amount:  Scant    Wound treatment:  Packing placed    Packing materials:  1/4 in iodoform gauze  Post-procedure details:     Patient tolerance of procedure: Tolerated well, no immediate complications  Comments:      Laceration 0 5 mm across dorsal ring finger repaired using 4 0 chromic gut sutures in simple interrupted fashion            Scribe Attestation    I,:  Ronald Arroyo am acting as a scribe while in the presence of the attending physician :       I,:  Shruthi Samson MD personally performed the services described in this documentation    as scribed in my presence :

## 2022-02-20 LAB
BACTERIA WND AEROBE CULT: ABNORMAL
BACTERIA WND AEROBE CULT: ABNORMAL
GRAM STN SPEC: ABNORMAL
GRAM STN SPEC: ABNORMAL

## 2022-02-23 ENCOUNTER — OFFICE VISIT (OUTPATIENT)
Dept: FAMILY MEDICINE CLINIC | Facility: CLINIC | Age: 57
End: 2022-02-23
Payer: MEDICARE

## 2022-02-23 ENCOUNTER — OFFICE VISIT (OUTPATIENT)
Dept: OBGYN CLINIC | Facility: HOSPITAL | Age: 57
End: 2022-02-23

## 2022-02-23 VITALS
HEIGHT: 65 IN | SYSTOLIC BLOOD PRESSURE: 118 MMHG | WEIGHT: 146.8 LBS | HEART RATE: 97 BPM | DIASTOLIC BLOOD PRESSURE: 77 MMHG | BODY MASS INDEX: 24.46 KG/M2

## 2022-02-23 VITALS
BODY MASS INDEX: 24.26 KG/M2 | TEMPERATURE: 96.6 F | WEIGHT: 145.6 LBS | OXYGEN SATURATION: 98 % | DIASTOLIC BLOOD PRESSURE: 74 MMHG | SYSTOLIC BLOOD PRESSURE: 128 MMHG | HEART RATE: 105 BPM | HEIGHT: 65 IN

## 2022-02-23 DIAGNOSIS — S61.411D LACERATION OF RIGHT HAND WITHOUT FOREIGN BODY, SUBSEQUENT ENCOUNTER: Primary | ICD-10-CM

## 2022-02-23 DIAGNOSIS — D72.829 LEUKOCYTOSIS, UNSPECIFIED TYPE: ICD-10-CM

## 2022-02-23 DIAGNOSIS — Z00.00 MEDICARE ANNUAL WELLNESS VISIT, SUBSEQUENT: Primary | ICD-10-CM

## 2022-02-23 DIAGNOSIS — L08.9 FINGER INFECTION: ICD-10-CM

## 2022-02-23 PROCEDURE — G0402 INITIAL PREVENTIVE EXAM: HCPCS | Performed by: FAMILY MEDICINE

## 2022-02-23 PROCEDURE — 99213 OFFICE O/P EST LOW 20 MIN: CPT | Performed by: FAMILY MEDICINE

## 2022-02-23 PROCEDURE — 99024 POSTOP FOLLOW-UP VISIT: CPT | Performed by: ORTHOPAEDIC SURGERY

## 2022-02-23 RX ORDER — CELECOXIB 200 MG/1
CAPSULE ORAL
COMMUNITY
Start: 2022-02-19

## 2022-02-23 RX ORDER — TRAMADOL HYDROCHLORIDE 50 MG/1
50 TABLET ORAL EVERY 6 HOURS PRN
Qty: 20 TABLET | Refills: 0 | Status: SHIPPED | OUTPATIENT
Start: 2022-02-23

## 2022-02-23 RX ORDER — FAMOTIDINE 20 MG/1
TABLET, FILM COATED ORAL
COMMUNITY
Start: 2022-01-24

## 2022-02-23 RX ORDER — GABAPENTIN 300 MG/1
CAPSULE ORAL
COMMUNITY
Start: 2022-01-24

## 2022-02-23 NOTE — PROGRESS NOTES
ASSESSMENT/PLAN:    Assessment:   Left ring finger infection (improving)  Right hand laceration (delayed healing)    Plan:   Wash with soap and water pat dry  Finish antibiotic   Hand therapy for wound care  Tramadol sent to pharmacy on file    Follow Up:  3  week(s)    To Do Next Visit:   recheck    _____________________________________________________  CHIEF COMPLAINT:  Chief Complaint   Patient presents with    Left Index Finger - Infection, Follow-up         SUBJECTIVE:  Samantha Garcia is a 49 y o  male who presents for follow up regarding left ring finger laceration and right hand MP laceration  Left ring finger I&D was performed at the last visit  Patient was placed on Augmentin which he has been taking  Patient reports swelling and erythema has improved since the last visit  He is concerned about the right MP laceration  PAST MEDICAL HISTORY:  Past Medical History:   Diagnosis Date    Back pain        PAST SURGICAL HISTORY:  Past Surgical History:   Procedure Laterality Date    BACK SURGERY  09/2017    HERNIA REPAIR      INGUINAL    MASS EXCISION N/A 10/25/2017    Procedure: RIGHT BACK MASS EXCISION;  Surgeon: Naeem Park MD;  [facility];  Service: General    NECK SURGERY         FAMILY HISTORY:  History reviewed  No pertinent family history      SOCIAL HISTORY:  Social History     Tobacco Use    Smoking status: Former Smoker     Packs/day: 0 10    Smokeless tobacco: Never Used   Substance Use Topics    Alcohol use: Yes     Comment: socially    Drug use: No       MEDICATIONS:    Current Outpatient Medications:     amoxicillin-clavulanate (AUGMENTIN) 875-125 mg per tablet, Take 1 tablet by mouth every 12 (twelve) hours for 10 days, Disp: 20 tablet, Rfl: 0    celecoxib (CeleBREX) 200 mg capsule, TAKE 1 CAPSULE TWICE A DAY BY MOUTH FOR PAIN/ANTI-INFLAMMATORY, Disp: , Rfl:     diazepam (VALIUM) 5 mg tablet, Take 1 tablet (5 mg total) by mouth every 8 (eight) hours as needed for anxiety, Disp: 30 tablet, Rfl: 1    famotidine (PEPCID) 20 mg tablet, 1 TWICE A DAY FOR ANTI-ACID, Disp: , Rfl:     gabapentin (NEURONTIN) 300 mg capsule, TAKE 1 CAPSULE 3X A DAY FOR NERVE PAIN, Disp: , Rfl:     valACYclovir (VALTREX) 1,000 mg tablet, Take 1 tablet (1,000 mg total) by mouth 2 (two) times a day for 5 days, Disp: 10 tablet, Rfl: 0    cyclobenzaprine (FLEXERIL) 10 mg tablet, Take 1 tablet (10 mg total) by mouth 3 (three) times a day as needed for muscle spasms (Patient not taking: Reported on 12/26/2018 ), Disp: 30 tablet, Rfl: 2    morphine (MSIR) 15 mg tablet, Take 1 tablet (15 mg total) by mouth every 4 (four) hours as needed for severe pain for up to 10 doses Max Daily Amount: 90 mg (Patient not taking: Reported on 2/23/2022 ), Disp: 10 tablet, Rfl: 0    neomycin-polymyxin-hydrocortisone (CORTISPORIN) 0 35%-10,000 units/mL-1% otic suspension, Administer 4 drops to the right ear every 6 (six) hours for 5 days, Disp: 10 mL, Rfl: 0    nystatin (MYCOSTATIN) cream, Apply topically 2 (two) times a day for 7 days, Disp: 30 g, Rfl: 1    nystatin-triamcinolone (MYCOLOG-II) cream, Apply topically 2 (two) times a day for 10 days, Disp: 120 g, Rfl: 1    oxyCODONE-acetaminophen (PERCOCET)  mg per tablet, Take 1 tablet by mouth every 4 (four) hours as needed for moderate pain (Patient not taking: Reported on 2/16/2022 ), Disp: , Rfl:     predniSONE 20 mg tablet, Please take 3 -20 mg tabs X3 days , Please take 2 - 20mg tabs X 3 days, Please take 1-20 mg tab X1 days (Patient not taking: Reported on 2/23/2022 ), Disp: 16 tablet, Rfl: 0    sildenafil (REVATIO) 20 mg tablet, Take 1 tablet (20 mg total) by mouth 3 (three) times a day (Patient not taking: Reported on 2/16/2022 ), Disp: 10 tablet, Rfl: 1    ALLERGIES:  No Known Allergies    REVIEW OF SYSTEMS:  Pertinent items are noted in HPI  A comprehensive review of systems was negative      LABS:  HgA1c: No results found for: HGBA1C  BMP:   Lab Results   Component Value Date    CALCIUM 9 0 02/13/2022    K 4 2 02/13/2022    CO2 25 02/13/2022     02/13/2022    BUN 9 02/13/2022    CREATININE 0 64 02/13/2022           _____________________________________________________  PHYSICAL EXAMINATION:  Vital signs: /77   Pulse 97   Ht 5' 5" (1 651 m)   Wt 66 6 kg (146 lb 12 8 oz)   BMI 24 43 kg/m²   General: well developed and well nourished, alert, oriented times 3 and appears comfortable  Psychiatric: Normal  HEENT: Trachea Midline, No torticollis  Cardiovascular: No discernable arrhythmia  Pulmonary: No wheezing or stridor  Abdomen: No rebound or guarding  Extremities: No peripheral edema  Skin: No masses, erythema, lacerations, fluctation, ulcerations  Neurovascular: Sensation Intact to the Median, Ulnar, Radial Nerve, Motor Intact to the Median, Ulnar, Radial Nerve and Pulses Intact    MUSCULOSKELETAL EXAMINATION:  Left hand:  Ring finger improved swelling and erythema   Able to flex and extend at left ring finger DIP    Right hand: U shaped laceration ulnar aspect of the small metacarpal, increased erythema and swelling since the last visit      _____________________________________________________  STUDIES REVIEWED:  No Studies to review      PROCEDURES PERFORMED:  Procedures  No Procedures performed today   Scribe Attestation    I,:   am acting as a scribe while in the presence of the attending physician :       I,:   personally performed the services described in this documentation    as scribed in my presence :

## 2022-02-23 NOTE — PROGRESS NOTES
Assessment and Plan:     Problem List Items Addressed This Visit     None      Visit Diagnoses     Medicare annual wellness visit, subsequent    -  Primary           Preventive health issues were discussed with patient, and age appropriate screening tests were ordered as noted in patient's After Visit Summary  Personalized health advice and appropriate referrals for health education or preventive services given if needed, as noted in patient's After Visit Summary  History of Present Illness:     Patient presents for Medicare Annual Wellness visit    Patient Care Team:  Cici White DO as PCP - General (Family Medicine)  Peewee Hughes as PCP - [address] [place] [address] (RTE)  Peewee Hughes as PCP - PCP-[employer] (RTE)  Zeke Landry MD     Problem List:     Patient Active Problem List   Diagnosis    Submuscular lipoma of chest    Cyst of skin    Skin rash    Soft tissue mass    Ear pain, right    Back pain    Bilateral hearing loss due to cerumen impaction    Anxiety    Overweight (BMI 25 0-29  9)    Negative depression screening    Annual physical exam      Past Medical and Surgical History:     Past Medical History:   Diagnosis Date    Back pain      Past Surgical History:   Procedure Laterality Date    BACK SURGERY  09/2017    HERNIA REPAIR      INGUINAL    MASS EXCISION N/A 10/25/2017    Procedure: RIGHT BACK MASS EXCISION;  Surgeon: Dipika Narvaez MD;  Location: [facility] OR;  Service: General    NECK SURGERY        Family History:     History reviewed  No pertinent family history  Social History:     Social History     Socioeconomic History    Marital status: /Civil Union     Spouse name: None    Number of children: None    Years of education: None    Highest education level: None   Occupational History    None   Tobacco Use    Smoking status: Former Smoker     Packs/day: 0 10    Smokeless tobacco: Never Used   Substance and Sexual Activity    Alcohol use:  Yes Comment: socially    Drug use: No    Sexual activity: None   Other Topics Concern    None   Social History Narrative    None     Social Determinants of Health     Financial Resource Strain: Not on file   Food Insecurity: Not on file   Transportation Needs: Not on file   Physical Activity: Not on file   Stress: Not on file   Social Connections: Not on file   Intimate Partner Violence: Not on file   Housing Stability: Not on file      Medications and Allergies:     Current Outpatient Medications   Medication Sig Dispense Refill    amoxicillin-clavulanate (AUGMENTIN) 875-125 mg per tablet Take 1 tablet by mouth every 12 (twelve) hours for 10 days 20 tablet 0    celecoxib (CeleBREX) 200 mg capsule TAKE 1 CAPSULE TWICE A DAY BY MOUTH FOR PAIN/ANTI-INFLAMMATORY      diazepam (VALIUM) 5 mg tablet Take 1 tablet (5 mg total) by mouth every 8 (eight) hours as needed for anxiety 30 tablet 1    famotidine (PEPCID) 20 mg tablet 1 TWICE A DAY FOR ANTI-ACID      gabapentin (NEURONTIN) 300 mg capsule TAKE 1 CAPSULE 3X A DAY FOR NERVE PAIN      cyclobenzaprine (FLEXERIL) 10 mg tablet Take 1 tablet (10 mg total) by mouth 3 (three) times a day as needed for muscle spasms (Patient not taking: Reported on 12/26/2018 ) 30 tablet 2    morphine (MSIR) 15 mg tablet Take 1 tablet (15 mg total) by mouth every 4 (four) hours as needed for severe pain for up to 10 doses Max Daily Amount: 90 mg (Patient not taking: Reported on 2/23/2022 ) 10 tablet 0    neomycin-polymyxin-hydrocortisone (CORTISPORIN) 0 35%-10,000 units/mL-1% otic suspension Administer 4 drops to the right ear every 6 (six) hours for 5 days 10 mL 0    nystatin (MYCOSTATIN) cream Apply topically 2 (two) times a day for 7 days 30 g 1    nystatin-triamcinolone (MYCOLOG-II) cream Apply topically 2 (two) times a day for 10 days 120 g 1    oxyCODONE-acetaminophen (PERCOCET)  mg per tablet Take 1 tablet by mouth every 4 (four) hours as needed for moderate pain (Patient not taking: Reported on 2/16/2022 )      predniSONE 20 mg tablet Please take 3 -20 mg tabs X3 days , Please take 2 - 20mg tabs X 3 days, Please take 1-20 mg tab X1 days (Patient not taking: Reported on 2/23/2022 ) 16 tablet 0    sildenafil (REVATIO) 20 mg tablet Take 1 tablet (20 mg total) by mouth 3 (three) times a day (Patient not taking: Reported on 2/16/2022 ) 10 tablet 1    valACYclovir (VALTREX) 1,000 mg tablet Take 1 tablet (1,000 mg total) by mouth 2 (two) times a day for 5 days 10 tablet 0     No current facility-administered medications for this visit  No Known Allergies   Immunizations:     Immunization History   Administered Date(s) Administered    Influenza, recombinant, quadrivalent,injectable, preservative free 01/13/2021    Tdap 07/11/2014, 02/12/2022    Zoster Vaccine Recombinant 01/13/2021      Health Maintenance:         Topic Date Due    Hepatitis C Screening  Never done    HIV Screening  Never done    Colorectal Cancer Screening  01/26/2024         Topic Date Due    COVID-19 Vaccine (1) Never done    Influenza Vaccine (1) 09/01/2021      Medicare Health Risk Assessment:     Ht 5' 5" (1 651 m)   Wt 66 kg (145 lb 9 6 oz)   BMI 24 23 kg/m²      Corrine Arredondo is here for his Subsequent Wellness visit  Last Medicare Wellness visit information reviewed, patient interviewed and updates made to the record today  Health Risk Assessment:   Patient rates overall health as good  Patient is satisfied with their life  Eyesight was rated as same  Hearing was rated as same  Patient feels that their emotional and mental health rating is same  Patients states they are sometimes angry  Patient states they are sometimes unusually tired/fatigued  Pain experienced in the last 7 days has been some  Patient's pain rating has been 2/10  Patient states that he has experienced no weight loss or gain in last 6 months  Depression Screening:   PHQ-2 Score: 0      Fall Risk Screening:    In the past year, patient has experienced: history of falling in past year    Number of falls: 2 or more  Injured during fall?: No    Feels unsteady when standing or walking?: Yes    Worried about falling?: No      Home Safety:  Patient does not have trouble with stairs inside or outside of their home  Patient has working smoke alarms and has working carbon monoxide detector  Home safety hazards include: none  Nutrition:   Current diet is Regular  Medications:   Patient is currently taking over-the-counter supplements  OTC medications include: see medication list  Patient is able to manage medications  Activities of Daily Living (ADLs)/Instrumental Activities of Daily Living (IADLs):   Walk and transfer into and out of bed and chair?: Yes  Dress and groom yourself?: Yes    Bathe or shower yourself?: Yes    Feed yourself?  Yes  Do your laundry/housekeeping?: Yes  Manage your money, pay your bills and track your expenses?: Yes  Make your own meals?: Yes    Do your own shopping?: Yes    Previous Hospitalizations:   Any hospitalizations or ED visits within the last 12 months?: Yes    How many hospitalizations have you had in the last year?: 1-2    Advance Care Planning:   Living will: No    Durable POA for healthcare: No    Advanced directive: No    Advanced directive counseling given: Yes    Five wishes given: Yes    Patient declined ACP directive: No    End of Life Decisions reviewed with patient: Yes    Provider agrees with end of life decisions: No      Cognitive Screening:   Provider or family/friend/caregiver concerned regarding cognition?: No    PREVENTIVE SCREENINGS      Cardiovascular Screening:    General: Screening Current      Diabetes Screening:     General: Screening Current      Colorectal Cancer Screening:     General: Screening Current      Prostate Cancer Screening:    General: Screening Not Indicated      Osteoporosis Screening:    General: Screening Not Indicated      Abdominal Aortic Aneurysm (AAA) Screening:    Risk factors include: tobacco use        Lung Cancer Screening:     General: Screening Not Indicated      Hepatitis C Screening:    General: Screening Not Indicated    Screening, Brief Intervention, and Referral to Treatment (SBIRT)    Screening  Typical number of drinks in a day: 0  Typical number of drinks in a week: 0  Interpretation: Low risk drinking behavior      Single Item Drug Screening:  How often have you used an illegal drug (including marijuana) or a prescription medication for non-medical reasons in the past year? never    Single Item Drug Screen Score: 0  Interpretation: Negative screen for possible drug use disorder      Carlena Phlegm, DO

## 2022-02-23 NOTE — PATIENT INSTRUCTIONS
Medicare Preventive Visit Patient Instructions  Thank you for completing your Welcome to Medicare Visit or Medicare Annual Wellness Visit today  Your next wellness visit will be due in one year (2/24/2023)  The screening/preventive services that you may require over the next 5-10 years are detailed below  Some tests may not apply to you based off risk factors and/or age  Screening tests ordered at today's visit but not completed yet may show as past due  Also, please note that scanned in results may not display below  Preventive Screenings:  Service Recommendations Previous Testing/Comments   Colorectal Cancer Screening  · Colonoscopy    · Fecal Occult Blood Test (FOBT)/Fecal Immunochemical Test (FIT)  · Fecal DNA/Cologuard Test  · Flexible Sigmoidoscopy Age: 54-65 years old   Colonoscopy: every 10 years (May be performed more frequently if at higher risk)  OR  FOBT/FIT: every 1 year  OR  Cologuard: every 3 years  OR  Sigmoidoscopy: every 5 years  Screening may be recommended earlier than age 48 if at higher risk for colorectal cancer  Also, an individualized decision between you and your healthcare provider will decide whether screening between the ages of 74-80 would be appropriate   Colonoscopy: Not on file  FOBT/FIT: Not on file  Cologuard: 01/26/2021  Sigmoidoscopy: Not on file    Screening Current     Prostate Cancer Screening Individualized decision between patient and health care provider in men between ages of 53-78   Medicare will cover every 12 months beginning on the day after your 50th birthday PSA: 0 8 ng/mL     Screening Not Indicated     Hepatitis C Screening Once for adults born between 1945 and 1965  More frequently in patients at high risk for Hepatitis C Hep C Antibody: Not on file    Screening Not Indicated   Diabetes Screening 1-2 times per year if you're at risk for diabetes or have pre-diabetes Fasting glucose: 81 mg/dL   A1C: No results in last 5 years    Screening Current   Cholesterol Screening Once every 5 years if you don't have a lipid disorder  May order more often based on risk factors  Lipid panel: 01/13/2021    Screening Current      Other Preventive Screenings Covered by Medicare:  1  Abdominal Aortic Aneurysm (AAA) Screening: covered once if your at risk  You're considered to be at risk if you have a family history of AAA or a male between the age of 73-68 who smoking at least 100 cigarettes in your lifetime  2  Lung Cancer Screening: covers low dose CT scan once per year if you meet all of the following conditions: (1) Age 50-69; (2) No signs or symptoms of lung cancer; (3) Current smoker or have quit smoking within the last 15 years; (4) You have a tobacco smoking history of at least 30 pack years (packs per day x number of years you smoked); (5) You get a written order from a healthcare provider  3  Glaucoma Screening: covered annually if you're considered high risk: (1) You have diabetes OR (2) Family history of glaucoma OR (3)  aged 48 and older OR (3)  American aged 72 and older  3  Osteoporosis Screening: covered every 2 years if you meet one of the following conditions: (1) Have a vertebral abnormality; (2) On glucocorticoid therapy for more than 3 months; (3) Have primary hyperparathyroidism; (4) On osteoporosis medications and need to assess response to drug therapy  5  HIV Screening: covered annually if you're between the age of 12-76  Also covered annually if you are younger than 13 and older than 72 with risk factors for HIV infection  For pregnant patients, it is covered up to 3 times per pregnancy      Immunizations:  Immunization Recommendations   Influenza Vaccine Annual influenza vaccination during flu season is recommended for all persons aged >= 6 months who do not have contraindications   Pneumococcal Vaccine (Prevnar and Pneumovax)  * Prevnar = PCV13  * Pneumovax = PPSV23 Adults 25-60 years old: 1-3 doses may be recommended based on certain risk factors  Adults 72 years old: Prevnar (PCV13) vaccine recommended followed by Pneumovax (PPSV23) vaccine  If already received PPSV23 since turning 65, then PCV13 recommended at least one year after PPSV23 dose  Hepatitis B Vaccine 3 dose series if at intermediate or high risk (ex: diabetes, end stage renal disease, liver disease)   Tetanus (Td) Vaccine - COST NOT COVERED BY MEDICARE PART B Following completion of primary series, a booster dose should be given every 10 years to maintain immunity against tetanus  Td may also be given as tetanus wound prophylaxis  Tdap Vaccine - COST NOT COVERED BY MEDICARE PART B Recommended at least once for all adults  For pregnant patients, recommended with each pregnancy  Shingles Vaccine (Shingrix) - COST NOT COVERED BY MEDICARE PART B  2 shot series recommended in those aged 48 and above     Health Maintenance Due:      Topic Date Due    Hepatitis C Screening  Never done    HIV Screening  Never done    Colorectal Cancer Screening  01/26/2024     Immunizations Due:      Topic Date Due    COVID-19 Vaccine (1) Never done    Influenza Vaccine (1) 09/01/2021     Advance Directives   What are advance directives? Advance directives are legal documents that state your wishes and plans for medical care  These plans are made ahead of time in case you lose your ability to make decisions for yourself  Advance directives can apply to any medical decision, such as the treatments you want, and if you want to donate organs  What are the types of advance directives? There are many types of advance directives, and each state has rules about how to use them  You may choose a combination of any of the following:  · Living will: This is a written record of the treatment you want  You can also choose which treatments you do not want, which to limit, and which to stop at a certain time  This includes surgery, medicine, IV fluid, and tube feedings     · Durable power of  for healthcare [place] [facility]): This is a written record that states who you want to make healthcare choices for you when you are unable to make them for yourself  This person, called a proxy, is usually a family member or a friend  You may choose more than 1 proxy  · Do not resuscitate (DNR) order:  A DNR order is used in case your heart stops beating or you stop breathing  It is a request not to have certain forms of treatment, such as CPR  A DNR order may be included in other types of advance directives  · Medical directive: This covers the care that you want if you are in a coma, near death, or unable to make decisions for yourself  You can list the treatments you want for each condition  Treatment may include pain medicine, surgery, blood transfusions, dialysis, IV or tube feedings, and a ventilator (breathing machine)  · Values history: This document has questions about your views, beliefs, and how you feel and think about life  This information can help others choose the care that you would choose  Why are advance directives important? An advance directive helps you control your care  Although spoken wishes may be used, it is better to have your wishes written down  Spoken wishes can be misunderstood, or not followed  Treatments may be given even if you do not want them  An advance directive may make it easier for your family to make difficult choices about your care  © Copyright [employer] 2018 Information is for End User's use only and may not be sold, redistributed or otherwise used for commercial purposes   All illustrations and images included in CareNotes® are the copyrighted property of [employer]  or [address] [place] [address] [employer]

## 2022-02-23 NOTE — PROGRESS NOTES
Assessment/Plan:    No problem-specific Assessment & Plan notes found for this encounter  Diagnoses and all orders for this visit:    Medicare annual wellness visit, subsequent    Leukocytosis, unspecified type  Comments:  pt had an infection when giving the blood    Other orders  -     celecoxib (CeleBREX) 200 mg capsule; TAKE 1 CAPSULE TWICE A DAY BY MOUTH FOR PAIN/ANTI-INFLAMMATORY  -     famotidine (PEPCID) 20 mg tablet; 1 TWICE A DAY FOR ANTI-ACID  -     gabapentin (NEURONTIN) 300 mg capsule; TAKE 1 CAPSULE 3X A DAY FOR NERVE PAIN          PHQ-2/9 Depression Screening    Little interest or pleasure in doing things: 0 - not at all  Feeling down, depressed, or hopeless: 0 - not at all  PHQ-2 Score: 0  PHQ-2 Interpretation: Negative depression screen            Subjective:      Patient ID: Chyrl Juan is a 49 y o  male  HPI    The following portions of the patient's history were reviewed and updated as appropriate: allergies, current medications, past family history, past medical history, past social history, past surgical history and problem list     Review of Systems   Constitutional: Negative  Negative for chills, fatigue and fever  HENT: Negative  Negative for hearing loss  Eyes: Negative  Negative for visual disturbance  Respiratory: Negative for shortness of breath and wheezing  Cardiovascular: Negative for chest pain and palpitations  Gastrointestinal: Negative for abdominal pain, blood in stool, constipation, diarrhea, nausea and vomiting  Endocrine: Negative  Genitourinary: Negative for difficulty urinating and dysuria  Musculoskeletal: Negative for arthralgias and myalgias  Skin: Negative  Allergic/Immunologic: Negative  Neurological: Negative for seizures and syncope  Hematological: Negative for adenopathy  Psychiatric/Behavioral: Negative            Objective:    /74 (BP Location: Left arm, Patient Position: Sitting)   Pulse 105   Temp (!) 96 6 °F (35 9 °C) (Tympanic)   Ht 5' 5" (1 651 m)   Wt 66 kg (145 lb 9 6 oz)   SpO2 98%   BMI 24 23 kg/m²      Physical Exam  Vitals and nursing note reviewed  Constitutional:       General: He is not in acute distress  Appearance: Normal appearance  He is well-developed  He is not ill-appearing, toxic-appearing or diaphoretic  HENT:      Head: Normocephalic and atraumatic  Right Ear: Tympanic membrane, ear canal and external ear normal  There is no impacted cerumen  Left Ear: Tympanic membrane, ear canal and external ear normal  There is no impacted cerumen  Nose: Nose normal  No congestion or rhinorrhea  Mouth/Throat:      Mouth: Mucous membranes are moist       Pharynx: Oropharynx is clear  No oropharyngeal exudate or posterior oropharyngeal erythema  Eyes:      General: No scleral icterus  Right eye: No discharge  Left eye: No discharge  Extraocular Movements: Extraocular movements intact  Conjunctiva/sclera: Conjunctivae normal       Pupils: Pupils are equal, round, and reactive to light  Cardiovascular:      Rate and Rhythm: Normal rate and regular rhythm  Pulses: Normal pulses  Heart sounds: Normal heart sounds  No murmur heard  No friction rub  No gallop  Pulmonary:      Effort: Pulmonary effort is normal  No respiratory distress  Breath sounds: Normal breath sounds  No wheezing, rhonchi or rales  Abdominal:      General: Bowel sounds are normal  There is no distension  Palpations: Abdomen is soft  There is no mass  Tenderness: There is no abdominal tenderness  There is no guarding or rebound  Musculoskeletal:         General: No swelling or deformity  Normal range of motion  Cervical back: Normal range of motion and neck supple  No rigidity  Right lower leg: No edema  Left lower leg: No edema  Lymphadenopathy:      Cervical: No cervical adenopathy  Skin:     General: Skin is warm and dry        Findings: No rash  Neurological:      General: No focal deficit present  Mental Status: He is alert and oriented to person, place, and time  Sensory: No sensory deficit  Motor: No weakness  Coordination: Coordination normal       Gait: Gait normal       Deep Tendon Reflexes: Reflexes are normal and symmetric  Reflexes normal    Psychiatric:         Mood and Affect: Mood normal          Behavior: Behavior normal          Thought Content:  Thought content normal          Judgment: Judgment normal

## 2022-02-24 ENCOUNTER — EVALUATION (OUTPATIENT)
Dept: OCCUPATIONAL THERAPY | Age: 57
End: 2022-02-24
Payer: MEDICARE

## 2022-02-24 DIAGNOSIS — M79.641 RIGHT HAND PAIN: Primary | ICD-10-CM

## 2022-02-24 PROCEDURE — 97165 OT EVAL LOW COMPLEX 30 MIN: CPT

## 2022-02-24 PROCEDURE — 97140 MANUAL THERAPY 1/> REGIONS: CPT

## 2022-02-24 NOTE — PROGRESS NOTES
OT Evaluation     Today's date: 2022  Patient name: Keene Buerger  : 1965  MRN: [id number]  Referring provider: Freida Fry MD  Dx:   Encounter Diagnosis     ICD-10-CM    1  Right hand pain  M79 641                   Assessment  Assessment details: Merlyn Bolton presents with a crescent shaped wound on his ulnar hand and is now at therapy for MIST treatment per MD request  He has full ROM and notes pain with palpation of the wound  Recommend skilled OT for MIST treatment and wound care 3x a week for the next 4 weeks  Impairments: lacks appropriate home exercise program and pain with function  Other impairment: Wound    Goals  STG 1: Decrease overall pain to 3/10 in 4-6 weeks  STG 2: Decrease wound size by 0 5cm overall in 4-6 weeks  STG 3: Compliant with HEP in 2 weeks  LTG 1: Complete all ADL/IADLs improved to prior level of function within 6-8 weeks  LTG 2: Leisure/social skills improved within 6-8 weeks  LTG 3: Work skills improved to prior level of function within 6-8 weeks    Patient Goal: For the wound to heal without surgery    Goals, plan of care and treatment condition discussed with patient  Patient expresses their understanding and questions regarding these isues were addressed  Plan  Patient would benefit from: custom splinting, OT eval and skilled occupational therapy  Planned modality interventions: MIST  Planned therapy interventions: Wound Care  Frequency: 3x week  Treatment plan discussed with: patient        Subjective Evaluation    History of Present Illness  Date of onset: 2/10/2021  Mechanism of injury: Merlyn Bolton was washing a pint glass when the glass broke and created a laceration on his ulnar hand in line with his MCP  Pain  Current pain ratin  At best pain ratin  At worst pain ratin          Objective     Observations     Right Wrist/Hand   Positive for edema       Additional Observation Details  [id number] crescent shaped wound with no drainage noted      Active Range of Motion     Right Wrist   Wrist flexion: WFL  Wrist extension: WFl  Radial deviation: WFL  Ulnar deviation: WFL    Additional Active Range of Motion Details  WFL Composite Fist             Precautions: Wound      Manuals 2/24            MIST 3'            Wound Care 5'                                      Neuro Re-Ed                                                                                                        Ther Ex                                                                                                                     Ther Activity                                       Gait Training                                       Modalities

## 2022-03-01 ENCOUNTER — EVALUATION (OUTPATIENT)
Dept: PHYSICAL THERAPY | Age: 57
End: 2022-03-01
Payer: OTHER MISCELLANEOUS

## 2022-03-01 ENCOUNTER — OFFICE VISIT (OUTPATIENT)
Dept: OCCUPATIONAL THERAPY | Age: 57
End: 2022-03-01
Payer: MEDICARE

## 2022-03-01 DIAGNOSIS — M54.16 LUMBAR RADICULOPATHY: Primary | ICD-10-CM

## 2022-03-01 DIAGNOSIS — M79.641 RIGHT HAND PAIN: Primary | ICD-10-CM

## 2022-03-01 PROCEDURE — 97161 PT EVAL LOW COMPLEX 20 MIN: CPT | Performed by: PHYSICAL THERAPIST

## 2022-03-01 PROCEDURE — 97140 MANUAL THERAPY 1/> REGIONS: CPT

## 2022-03-01 PROCEDURE — 97610 LOW FREQUENCY NON-THERMAL US: CPT

## 2022-03-01 NOTE — LETTER
2022    Mandy Goel 86    Patient: Samantha Garcia   YOB: 1965   Date of Visit: 3/1/2022     Encounter Diagnosis     ICD-10-CM    1  Lumbar radiculopathy  M54 16        Dear Dr Cox Core: Thank you for your recent referral of Samantha Garcia  Please review the attached evaluation summary from Glen's recent visit  Please verify that you agree with the plan of care by signing the attached order  If you have any questions or concerns, please do not hesitate to call  I sincerely appreciate the opportunity to share in the care of one of your patients and hope to have another opportunity to work with you in the near future  Sincerely,    Ronald Spicer, PT      Referring Provider:      I certify that I have read the below Plan of Care and certify the need for these services furnished under this plan of treatment while under my care  David Thomas MD  [facility] [address]  Via Fax: [phone]          PT Evaluation     Today's date: 3/1/2022  Patient name: Samantha Garcia  : 1965  MRN: [id number]  Referring provider: Monserrat Thompson MD  Dx:   Encounter Diagnosis     ICD-10-CM    1  Lumbar radiculopathy  M54 16                   Assessment  Assessment details: Pt reports to PT with cc of lumbar and cervical pain that began following work related incident  Pt has symptoms consistent with upper cervical mobility deficits and lumbar instability   Pt would benefit from skilled PT in order to decrease difficulty with ADLs  Impairments: abnormal coordination, abnormal muscle firing, abnormal muscle tone, abnormal or restricted ROM, activity intolerance, impaired physical strength, lacks appropriate home exercise program, pain with function and poor body mechanics    Plan  Patient would benefit from: skilled physical therapy  Planned therapy interventions: joint mobilization, manual therapy, abdominal trunk stabilization, motor coordination training, neuromuscular re-education, patient education, strengthening, stretching, therapeutic activities, therapeutic exercise, functional ROM exercises and home exercise program  Frequency: 2x week  Duration in weeks: 6  Plan of Care beginning date: 3/1/2022  Plan of Care expiration date: 4/12/2022  Treatment plan discussed with: patient and PTA        Subjective Evaluation    History of Present Illness  Mechanism of injury: Pt reports to PT with cc of lumbar and cervical pain that began following work related incident  Pt Cannot recall specific date of incident, but has history of cervical and lumbar fusion "2-3" years ago  Objective     Concurrent Complaints  Positive for headaches  Active Range of Motion     Additional Active Range of Motion Details  Lumbar ROM as % of normal ROM    Flex:50  Ext:50  R ROT:75  L ROT:75      Passive Range of Motion   Cervical/Thoracic Spine   Cervical     Extension (degrees): 12  Left rotation (degrees): 35  Right rotation (degrees): 40    Strength/Myotome Testing     Left Hip   Planes of Motion   Abduction: 3+    Right Hip   Planes of Motion   Abduction: 3+    Left Knee   Flexion: 3+  Extension: 4    Right Knee   Flexion: 4  Extension: 4    Left Ankle/Foot   Dorsiflexion: 3+  Plantar flexion: 4    Right Ankle/Foot   Dorsiflexion: 4    Tests     Left Shoulder   Negative ULTT1 and ULTT4  Right Shoulder   Negative ULTT1 and ULTT4  Lumbar     Left   Negative passive SLR and slump test      Right   Negative passive SLR and slump test    Neuro Exam:     Headaches   Patient reports headaches: Yes                Precautions: Hx of cervical/lumbar fusion      Manuals                                                                 Neuro Re-Ed                                                                                                        Ther Ex Ther Activity                                       Gait Training                                       Modalities

## 2022-03-01 NOTE — PROGRESS NOTES
Daily Note     Today's date: 3/1/2022  Patient name: Nelida Goodpasture  : 1965  MRN: [id number]  Referring provider: Bruce Sandoval MD  Dx:   Encounter Diagnosis     ICD-10-CM    1  Right hand pain  M79 641                   Subjective: I am running out of wound care supplies      Objective: See treatment diary below      Assessment: Tolerated treatment well  Patient would benefit from continued OT  Tj Christine presents with tape around his wound due to running out of wound care supplies  His wound is macerated however it does appear that there has been healing present  Completed MIST and wound care  Wrapped with Coban for shaping and to allow the wound to breathe  Instructed in airing out the wound to allow it to dry and heal  Provided coban for wound care  Plan: Continue per plan of care        Precautions: Wound      Manuals 3/1            MIST 3'            Wound Care 10'                                      Neuro Re-Ed                                                                                                        Ther Ex                                                                               '                                      Ther Activity                                       Gait Training                                       Modalities

## 2022-03-01 NOTE — PROGRESS NOTES
PT Evaluation     Today's date: 3/1/2022  Patient name: Tasneem Guaman  : 1965  MRN: [id number]  Referring provider: Elda Espinoza MD  Dx:   Encounter Diagnosis     ICD-10-CM    1  Lumbar radiculopathy  M54 16                   Assessment  Assessment details: Pt reports to PT with cc of lumbar and cervical pain that began following work related incident  Pt has symptoms consistent with upper cervical mobility deficits and lumbar instability  Pt would benefit from skilled PT in order to decrease difficulty with ADLs  Impairments: abnormal coordination, abnormal muscle firing, abnormal muscle tone, abnormal or restricted ROM, activity intolerance, impaired physical strength, lacks appropriate home exercise program, pain with function and poor body mechanics    Plan  Patient would benefit from: skilled physical therapy  Planned therapy interventions: joint mobilization, manual therapy, abdominal trunk stabilization, motor coordination training, neuromuscular re-education, patient education, strengthening, stretching, therapeutic activities, therapeutic exercise, functional ROM exercises and home exercise program  Frequency: 2x week  Duration in weeks: 6  Plan of Care beginning date: 3/1/2022  Plan of Care expiration date: 2022  Treatment plan discussed with: patient and PTA        Subjective Evaluation    History of Present Illness  Mechanism of injury: Pt reports to PT with cc of lumbar and cervical pain that began following work related incident  Pt Cannot recall specific date of incident, but has history of cervical and lumbar fusion "2-3" years ago  Objective     Concurrent Complaints  Positive for headaches       Active Range of Motion     Additional Active Range of Motion Details  Lumbar ROM as % of normal ROM    Flex:50  Ext:50  R ROT:75  L ROT:75      Passive Range of Motion   Cervical/Thoracic Spine   Cervical     Extension (degrees): 12  Left rotation (degrees): 35  Right rotation (degrees): 40    Strength/Myotome Testing     Left Hip   Planes of Motion   Abduction: 3+    Right Hip   Planes of Motion   Abduction: 3+    Left Knee   Flexion: 3+  Extension: 4    Right Knee   Flexion: 4  Extension: 4    Left Ankle/Foot   Dorsiflexion: 3+  Plantar flexion: 4    Right Ankle/Foot   Dorsiflexion: 4    Tests     Left Shoulder   Negative ULTT1 and ULTT4  Right Shoulder   Negative ULTT1 and ULTT4  Lumbar     Left   Negative passive SLR and slump test      Right   Negative passive SLR and slump test    Neuro Exam:     Headaches   Patient reports headaches: Yes                Precautions: Hx of cervical/lumbar fusion      Manuals                                                                 Neuro Re-Ed                                                                                                        Ther Ex                                                                                                                     Ther Activity                                       Gait Training                                       Modalities

## 2022-03-03 ENCOUNTER — OFFICE VISIT (OUTPATIENT)
Dept: OCCUPATIONAL THERAPY | Age: 57
End: 2022-03-03
Payer: MEDICARE

## 2022-03-03 DIAGNOSIS — M79.641 RIGHT HAND PAIN: Primary | ICD-10-CM

## 2022-03-03 PROCEDURE — 97140 MANUAL THERAPY 1/> REGIONS: CPT

## 2022-03-03 PROCEDURE — 97610 LOW FREQUENCY NON-THERMAL US: CPT

## 2022-03-03 NOTE — PROGRESS NOTES
Daily Note     Today's date: 3/3/2022  Patient name: Oneyda Velasquez  : 1965  MRN: [id number]  Referring provider: Ubaldo Sutherland MD  Dx:   Encounter Diagnosis     ICD-10-CM    1  Right hand pain  M79 641                   Subjective: I got some paint on it      Objective: See treatment diary below      Assessment: Tolerated treatment well  Patient would benefit from continued OT  Decreased maceration of wound today  He reports he was painting at home without a wrap or gloves and now there is pain surrounding the wound with unrecognizable debris in wound  Plan: Continue per plan of care        Precautions: Wound      Manuals 3/1 3/3           MIST 3' Performed           Wound Care 10' 10'                                     Neuro Re-Ed                                                                                                        Ther Ex                                                                               '                                      Ther Activity                                       Gait Training                                       Modalities

## 2022-03-05 ENCOUNTER — APPOINTMENT (EMERGENCY)
Dept: RADIOLOGY | Facility: HOSPITAL | Age: 57
End: 2022-03-05
Payer: MEDICARE

## 2022-03-05 ENCOUNTER — HOSPITAL ENCOUNTER (EMERGENCY)
Facility: HOSPITAL | Age: 57
Discharge: HOME | End: 2022-03-05
Attending: EMERGENCY MEDICINE
Payer: MEDICARE

## 2022-03-05 VITALS
OXYGEN SATURATION: 97 % | DIASTOLIC BLOOD PRESSURE: 84 MMHG | WEIGHT: 147 LBS | BODY MASS INDEX: 24.46 KG/M2 | RESPIRATION RATE: 20 BRPM | SYSTOLIC BLOOD PRESSURE: 129 MMHG | HEART RATE: 76 BPM | TEMPERATURE: 97.7 F

## 2022-03-05 DIAGNOSIS — L02.512 ABSCESS OF LEFT RING FINGER: Primary | ICD-10-CM

## 2022-03-05 DIAGNOSIS — L03.012 CELLULITIS OF LEFT RING FINGER: ICD-10-CM

## 2022-03-05 LAB
ANION GAP SERPL CALCULATED.3IONS-SCNC: 3 MMOL/L (ref 4–13)
BASOPHILS # BLD AUTO: 0.06 THOUSANDS/ΜL (ref 0–0.1)
BASOPHILS NFR BLD AUTO: 0 % (ref 0–1)
BUN SERPL-MCNC: 14 MG/DL (ref 5–25)
CALCIUM SERPL-MCNC: 9.6 MG/DL (ref 8.3–10.1)
CHLORIDE SERPL-SCNC: 106 MMOL/L (ref 100–108)
CO2 SERPL-SCNC: 26 MMOL/L (ref 21–32)
CREAT SERPL-MCNC: 0.79 MG/DL (ref 0.6–1.3)
CRP SERPL QL: <3 MG/L
EOSINOPHIL # BLD AUTO: 0.48 THOUSAND/ΜL (ref 0–0.61)
EOSINOPHIL NFR BLD AUTO: 3 % (ref 0–6)
ERYTHROCYTE [DISTWIDTH] IN BLOOD BY AUTOMATED COUNT: 12.5 % (ref 11.6–15.1)
ERYTHROCYTE [SEDIMENTATION RATE] IN BLOOD: 5 MM/HOUR (ref 0–19)
GFR SERPL CREATININE-BSD FRML MDRD: 101 ML/MIN/1.73SQ M
GLUCOSE SERPL-MCNC: 74 MG/DL (ref 65–140)
HCT VFR BLD AUTO: 41.5 % (ref 36.5–49.3)
HGB BLD-MCNC: 14.6 G/DL (ref 12–17)
IMM GRANULOCYTES # BLD AUTO: 0.06 THOUSAND/UL (ref 0–0.2)
IMM GRANULOCYTES NFR BLD AUTO: 0 % (ref 0–2)
LYMPHOCYTES # BLD AUTO: 1.53 THOUSANDS/ΜL (ref 0.6–4.47)
LYMPHOCYTES NFR BLD AUTO: 11 % (ref 14–44)
MCH RBC QN AUTO: 29.3 PG (ref 26.8–34.3)
MCHC RBC AUTO-ENTMCNC: 35.2 G/DL (ref 31.4–37.4)
MCV RBC AUTO: 83 FL (ref 82–98)
MONOCYTES # BLD AUTO: 1.02 THOUSAND/ΜL (ref 0.17–1.22)
MONOCYTES NFR BLD AUTO: 7 % (ref 4–12)
NEUTROPHILS # BLD AUTO: 10.88 THOUSANDS/ΜL (ref 1.85–7.62)
NEUTS SEG NFR BLD AUTO: 79 % (ref 43–75)
NRBC BLD AUTO-RTO: 0 /100 WBCS
PLATELET # BLD AUTO: 359 THOUSANDS/UL (ref 149–390)
PMV BLD AUTO: 9.1 FL (ref 8.9–12.7)
POTASSIUM SERPL-SCNC: 4.3 MMOL/L (ref 3.5–5.3)
RBC # BLD AUTO: 4.99 MILLION/UL (ref 3.88–5.62)
SODIUM SERPL-SCNC: 135 MMOL/L (ref 136–145)
WBC # BLD AUTO: 14.03 THOUSAND/UL (ref 4.31–10.16)

## 2022-03-05 PROCEDURE — 36415 COLL VENOUS BLD VENIPUNCTURE: CPT | Performed by: PHYSICIAN ASSISTANT

## 2022-03-05 PROCEDURE — 85025 COMPLETE CBC W/AUTO DIFF WBC: CPT | Performed by: PHYSICIAN ASSISTANT

## 2022-03-05 PROCEDURE — 99285 EMERGENCY DEPT VISIT HI MDM: CPT | Performed by: PHYSICIAN ASSISTANT

## 2022-03-05 PROCEDURE — NC001 PR NO CHARGE: Performed by: ORTHOPAEDIC SURGERY

## 2022-03-05 PROCEDURE — 96375 TX/PRO/DX INJ NEW DRUG ADDON: CPT

## 2022-03-05 PROCEDURE — 99284 EMERGENCY DEPT VISIT MOD MDM: CPT

## 2022-03-05 PROCEDURE — 86140 C-REACTIVE PROTEIN: CPT | Performed by: ORTHOPAEDIC SURGERY

## 2022-03-05 PROCEDURE — 73140 X-RAY EXAM OF FINGER(S): CPT

## 2022-03-05 PROCEDURE — 85652 RBC SED RATE AUTOMATED: CPT | Performed by: ORTHOPAEDIC SURGERY

## 2022-03-05 PROCEDURE — 96365 THER/PROPH/DIAG IV INF INIT: CPT

## 2022-03-05 PROCEDURE — 96367 TX/PROPH/DG ADDL SEQ IV INF: CPT

## 2022-03-05 PROCEDURE — 80048 BASIC METABOLIC PNL TOTAL CA: CPT | Performed by: PHYSICIAN ASSISTANT

## 2022-03-05 PROCEDURE — 87040 BLOOD CULTURE FOR BACTERIA: CPT | Performed by: PHYSICIAN ASSISTANT

## 2022-03-05 RX ORDER — MORPHINE SULFATE 30 MG/1
15 TABLET ORAL EVERY 4 HOURS PRN
Qty: 10 TABLET | Refills: 0 | Status: SHIPPED | OUTPATIENT
Start: 2022-03-05 | End: 2022-03-08

## 2022-03-05 RX ORDER — LIDOCAINE HYDROCHLORIDE 10 MG/ML
20 INJECTION, SOLUTION EPIDURAL; INFILTRATION; INTRACAUDAL; PERINEURAL ONCE
Status: COMPLETED | OUTPATIENT
Start: 2022-03-05 | End: 2022-03-05

## 2022-03-05 RX ORDER — VANCOMYCIN HYDROCHLORIDE 1 G/200ML
15 INJECTION, SOLUTION INTRAVENOUS ONCE
Status: COMPLETED | OUTPATIENT
Start: 2022-03-05 | End: 2022-03-05

## 2022-03-05 RX ORDER — ONDANSETRON 2 MG/ML
4 INJECTION INTRAMUSCULAR; INTRAVENOUS ONCE
Status: COMPLETED | OUTPATIENT
Start: 2022-03-05 | End: 2022-03-05

## 2022-03-05 RX ORDER — SULFAMETHOXAZOLE AND TRIMETHOPRIM 800; 160 MG/1; MG/1
1 TABLET ORAL 2 TIMES DAILY
Qty: 20 TABLET | Refills: 0 | Status: SHIPPED | OUTPATIENT
Start: 2022-03-05 | End: 2022-03-15

## 2022-03-05 RX ORDER — KETOROLAC TROMETHAMINE 30 MG/ML
15 INJECTION, SOLUTION INTRAMUSCULAR; INTRAVENOUS ONCE
Status: COMPLETED | OUTPATIENT
Start: 2022-03-05 | End: 2022-03-05

## 2022-03-05 RX ADMIN — LIDOCAINE HYDROCHLORIDE 20 ML: 10 INJECTION, SOLUTION EPIDURAL; INFILTRATION; INTRACAUDAL; PERINEURAL at 12:41

## 2022-03-05 RX ADMIN — MORPHINE SULFATE 2 MG: 2 INJECTION, SOLUTION INTRAMUSCULAR; INTRAVENOUS at 12:35

## 2022-03-05 RX ADMIN — Medication 1000 MG: at 11:54

## 2022-03-05 RX ADMIN — ONDANSETRON 4 MG: 2 INJECTION INTRAMUSCULAR; INTRAVENOUS at 11:54

## 2022-03-05 RX ADMIN — KETOROLAC TROMETHAMINE 15 MG: 30 INJECTION, SOLUTION INTRAMUSCULAR at 11:54

## 2022-03-05 RX ADMIN — VANCOMYCIN HYDROCHLORIDE 1000 MG: 1 INJECTION, SOLUTION INTRAVENOUS at 12:35

## 2022-03-05 NOTE — DISCHARGE INSTRUCTIONS
Discharge Instructions - Orthopedics  Concetta Ye 49 y o  male MRN: [id number]  Unit/Bed#: ED 12    Weight Bearing Status:                                           Non weight bearing left upper extremity in soft dressing  Three times daily soapy warm water soaks for 15 min at a time, apply new clean bandage after each soak  Antibiotics:  Bactrim DS x 10 days    Pain:  Continue analgesics as directed    Dressing Instructions:   Please keep clean, dry and intact until follow up     Appt Instructions: If you do not have your appointment, please call the clinic at [phone] t  Otherwise followup as scheduled     Contact the office sooner if you experience any increased numbness/tingling in the extremities  Miscellaneous: Follow up in 1 week for wound check  Patient provided with instructions to return to ED if he does not improve over the next few days or if pain begins to worsen or has redness spreading up arm, or if he develops fevers chills nausea vomiting

## 2022-03-05 NOTE — ED PROVIDER NOTES
History  Chief Complaint   Patient presents with    Finger Swelling     Patient states he recently had foreign body taken out of his left 4th finger, it was healing good until yesterday night when it got red, swollen, and painful  65-year-old male presents emergency room for evaluation of left ring finger infection  States a few weeks ago he had an injury to the finger caused by fan he was working on  There was a piece of metal stuck in his finger which they removed  He has been taking Augmentin and has followed up with hand surgeon Dr Trever Santoro to low  Initially symptoms had improved however over the past day he has developed increased pain and swelling  Patient states it is throbbing  Pain is causing some nausea  Denies fever  Denies new injury to the finger  Pain is worse with movement  Pain does not radiate  History provided by:  Patient      Prior to Admission Medications   Prescriptions Last Dose Informant Patient Reported?  Taking?   celecoxib (CeleBREX) 200 mg capsule   Yes No   Sig: TAKE 1 CAPSULE TWICE A DAY BY MOUTH FOR PAIN/ANTI-INFLAMMATORY   cyclobenzaprine (FLEXERIL) 10 mg tablet   No No   Sig: Take 1 tablet (10 mg total) by mouth 3 (three) times a day as needed for muscle spasms   Patient not taking: Reported on 2018    diazepam (VALIUM) 5 mg tablet   No No   Sig: Take 1 tablet (5 mg total) by mouth every 8 (eight) hours as needed for anxiety   famotidine (PEPCID) 20 mg tablet   Yes No   Si TWICE A DAY FOR ANTI-ACID   gabapentin (NEURONTIN) 300 mg capsule   Yes No   Sig: TAKE 1 CAPSULE 3X A DAY FOR NERVE PAIN   morphine (MSIR) 15 mg tablet   No No   Sig: Take 1 tablet (15 mg total) by mouth every 4 (four) hours as needed for severe pain for up to 10 doses Max Daily Amount: 90 mg   Patient not taking: Reported on 2022    neomycin-polymyxin-hydrocortisone (CORTISPORIN) 0 35%-10,000 units/mL-1% otic suspension   No No   Sig: Administer 4 drops to the right ear every 6 (six) hours for 5 days   nystatin (MYCOSTATIN) cream   No No   Sig: Apply topically 2 (two) times a day for 7 days   nystatin-triamcinolone (MYCOLOG-II) cream   No No   Sig: Apply topically 2 (two) times a day for 10 days   oxyCODONE-acetaminophen (PERCOCET)  mg per tablet   Yes No   Sig: Take 1 tablet by mouth every 4 (four) hours as needed for moderate pain   Patient not taking: Reported on 2/16/2022    predniSONE 20 mg tablet   No No   Sig: Please take 3 -20 mg tabs X3 days , Please take 2 - 20mg tabs X 3 days, Please take 1-20 mg tab X1 days   Patient not taking: Reported on 2/23/2022    sildenafil (REVATIO) 20 mg tablet   No No   Sig: Take 1 tablet (20 mg total) by mouth 3 (three) times a day   Patient not taking: Reported on 2/16/2022    traMADol (Ultram) 50 mg tablet   No No   Sig: Take 1 tablet (50 mg total) by mouth every 6 (six) hours as needed for moderate pain   valACYclovir (VALTREX) 1,000 mg tablet   No No   Sig: Take 1 tablet (1,000 mg total) by mouth 2 (two) times a day for 5 days      Facility-Administered Medications: None       Past Medical History:   Diagnosis Date    Back pain        Past Surgical History:   Procedure Laterality Date    BACK SURGERY  09/2017    HERNIA REPAIR      INGUINAL    MASS EXCISION N/A 10/25/2017    Procedure: RIGHT BACK MASS EXCISION;  Surgeon: Sarita Salinas MD;  Location: [facility] OR;  Service: General    NECK SURGERY         History reviewed  No pertinent family history  I have reviewed and agree with the history as documented  E-Cigarette/Vaping     E-Cigarette/Vaping Substances     Social History     Tobacco Use    Smoking status: Former Smoker     Packs/day: 0 10    Smokeless tobacco: Never Used   Substance Use Topics    Alcohol use: Yes     Comment: socially    Drug use: No       Review of Systems   Constitutional: Negative for chills, diaphoresis and fever  Respiratory: Negative for shortness of breath  Cardiovascular: Negative for chest pain  Gastrointestinal: Positive for nausea  Musculoskeletal: Positive for joint swelling  Skin: Positive for rash and wound  Neurological: Negative for weakness and numbness  All other systems reviewed and are negative  Physical Exam  Physical Exam  Vitals and nursing note reviewed  Constitutional:       Appearance: Normal appearance  HENT:      Head: Atraumatic  Right Ear: External ear normal       Left Ear: External ear normal    Eyes:      General: No scleral icterus  Conjunctiva/sclera: Conjunctivae normal    Cardiovascular:      Rate and Rhythm: Normal rate and regular rhythm  Heart sounds: Normal heart sounds  Pulmonary:      Effort: Pulmonary effort is normal  No respiratory distress  Breath sounds: Normal breath sounds  Abdominal:      General: There is no distension  Musculoskeletal:      Cervical back: Neck supple  Comments: Left distal ring finger with tenderness, warmth, erythema, fluctuance noted dorsally over old wound, no active drainage  He does have distal flexor tendon sheath tenderness, finger is being is slightly flexor position, distal half is sausage like and he has pain with passive ROM concerning for possible developing flexor tenosynovitis even though original wound and fluctuance is dorsally  Please refer to images  Skin:     General: Skin is warm and dry  Neurological:      Mental Status: He is alert and oriented to person, place, and time     Psychiatric:         Mood and Affect: Mood normal                         Vital Signs  ED Triage Vitals [03/05/22 1037]   Temperature Pulse Respirations Blood Pressure SpO2   97 7 °F (36 5 °C) 77 20 136/87 98 %      Temp Source Heart Rate Source Patient Position - Orthostatic VS BP Location FiO2 (%)   Oral Monitor Sitting Right arm --      Pain Score       10 - Worst Possible Pain           Vitals:    03/05/22 1037 03/05/22 1215   BP: 136/87 129/84   Pulse: 77 76   Patient Position - Orthostatic VS: Sitting          Visual Acuity      ED Medications  Medications   ondansetron (ZOFRAN) injection 4 mg (4 mg Intravenous Given 3/5/22 1154)   ketorolac (TORADOL) injection 15 mg (15 mg Intravenous Given 3/5/22 1154)   vancomycin (VANCOCIN) IVPB (premix in dextrose) 1,000 mg 200 mL (0 mg/kg × 66 7 kg Intravenous Stopped 3/5/22 1358)   ceftriaxone (ROCEPHIN) 1 g/50 mL in dextrose IVPB (0 mg Intravenous Stopped 3/5/22 1234)   lidocaine (PF) (XYLOCAINE-MPF) 1 % injection 20 mL (20 mL Infiltration Given 3/5/22 1241)   morphine injection 2 mg (2 mg Intravenous Given 3/5/22 1235)       Diagnostic Studies  Results Reviewed     Procedure Component Value Units Date/Time    Sedimentation rate, automated [[id number]]  (Normal) Collected: 03/05/22 1141    Lab Status: Final result Specimen: Blood from Line, Venous Updated: 03/05/22 1406     Sed Rate 5 mm/hour     C-reactive protein [[id number]]  (Normal) Collected: 03/05/22 1141    Lab Status: Final result Specimen: Blood from Line, Venous Updated: 03/05/22 1234     CRP <3 0 mg/L     Narrative:      Note: Unit of Measure change to mg/L at [address] [facility] will show at 10 fold increase in CRP result to match network standards      Basic metabolic panel [[id number]]  (Abnormal) Collected: 03/05/22 1141    Lab Status: Final result Specimen: Blood from Line, Venous Updated: 03/05/22 1213     Sodium 135 mmol/L      Potassium 4 3 mmol/L      Chloride 106 mmol/L      CO2 26 mmol/L      ANION GAP 3 mmol/L      BUN 14 mg/dL      Creatinine 0 79 mg/dL      Glucose 74 mg/dL      Calcium 9 6 mg/dL      eGFR 101 ml/min/1 73sq m     Narrative:      Litzy guidelines for Chronic Kidney Disease (CKD):     Stage 1 with normal or high GFR (GFR > 90 mL/min/1 73 square meters)    Stage 2 Mild CKD (GFR = 60-89 mL/min/1 73 square meters)    Stage 3A Moderate CKD (GFR = 45-59 mL/min/1 73 square meters)    Stage 3B Moderate CKD (GFR = 30-44 mL/min/1 73 square meters)    Stage 4 Severe CKD (GFR = 15-29 mL/min/1 73 square meters)    Stage 5 End Stage CKD (GFR <15 mL/min/1 73 square meters)  Note: GFR calculation is accurate only with a steady state creatinine    CBC and differential [[id number]]  (Abnormal) Collected: 03/05/22 1141    Lab Status: Final result Specimen: Blood from Line, Venous Updated: 03/05/22 1203     WBC 14 03 Thousand/uL      RBC 4 99 Million/uL      Hemoglobin 14 6 g/dL      Hematocrit 41 5 %      MCV 83 fL      MCH 29 3 pg      MCHC 35 2 g/dL      RDW 12 5 %      MPV 9 1 fL      Platelets 488 Thousands/uL      nRBC 0 /100 WBCs      Neutrophils Relative 79 %      Immat GRANS % 0 %      Lymphocytes Relative 11 %      Monocytes Relative 7 %      Eosinophils Relative 3 %      Basophils Relative 0 %      Neutrophils Absolute 10 88 Thousands/µL      Immature Grans Absolute 0 06 Thousand/uL      Lymphocytes Absolute 1 53 Thousands/µL      Monocytes Absolute 1 02 Thousand/µL      Eosinophils Absolute 0 48 Thousand/µL      Basophils Absolute 0 06 Thousands/µL     Blood culture #1 [[id number]] Collected: 03/05/22 1141    Lab Status: In process Specimen: Blood from Line, Venous Updated: 03/05/22 1155    Blood culture #2 [[id number]] Collected: 03/05/22 1141    Lab Status: In process Specimen: Blood from Line, Venous Updated: 03/05/22 1155                 XR finger fourth digit-ring LEFT    (Results Pending)              Procedures  Procedures         ED Course  ED Course as of 03/05/22 1629   Sat Mar 05, 2022   1124 Ortho paged   [phone] Ortho will come evaluate patient   4428 Ortho now evaluating patient                                               MDM  Number of Diagnoses or Management Options     Amount and/or Complexity of Data Reviewed  Clinical lab tests: ordered and reviewed  Tests in the radiology section of CPT®: ordered and reviewed (NAD)  Review and summarize past medical records: yes  Discuss the patient with other providers: yes (Orthopedics, please refer to his consult note   We  Had discussion concerning possible flexor tenosynovitis however he does not feel this is possible due to wound and abscess being on the dorsal side  He did open the wound and drain small amount of fluid  He advised warm soaks and, Bactrim as an oral antibiotics and follow-up in the office 1 week )    Risk of Complications, Morbidity, and/or Mortality  Presenting problems: moderate  Diagnostic procedures: low  Management options: moderate  General comments: Discussed with patient signs and symptoms if worse to return to emergency department such as but not limited to: increased pain, swelling, redness, fever    Patient Progress  Patient progress: improved      Disposition  Final diagnoses:   Abscess of left ring finger   Cellulitis of left ring finger     Time reflects when diagnosis was documented in both MDM as applicable and the Disposition within this note     Time User Action Codes Description Comment    3/5/2022  1:13 PM Marii Beebe [L02 [id number]] Abscess of left ring finger     3/5/2022  2:26 PM Marii Beeeb [[id number]] Cellulitis of left ring finger       ED Disposition     ED Disposition Condition Date/Time Comment    Discharge Stable Sat Mar 5, 2022  2:26 PM Torie Hammond discharge to home/self care              Follow-up Information     Follow up With Specialties Details Why Contact Info Additional Information    Jearldine Goodell, MD Orthopedic Surgery, Hand Surgery Follow up in 1 week(s)  [address] Emergency Department Emergency Medicine  If symptoms worsen [address] [place] [address]  [address] [place] [address] 64 [facility] Emergency Department, 261 [facility] 108          Discharge Medication List as of 3/5/2022  2:35 PM      START taking these medications    Details   !! morphine (MSIR) 30 MG tablet Take 0 5 tablets (15 mg total) by mouth every 4 (four) hours as needed for moderate pain or severe pain for up to 3 days Max Daily Amount: 90 mg, Starting Sat 3/5/2022, Until Tue 3/8/2022 at 2359, Normal      sulfamethoxazole-trimethoprim (Bactrim DS) 800-160 mg per tablet Take 1 tablet by mouth 2 (two) times a day for 10 days, Starting Sat 3/5/2022, Until Tue 3/15/2022, Normal       !! - Potential duplicate medications found  Please discuss with provider        CONTINUE these medications which have NOT CHANGED    Details   celecoxib (CeleBREX) 200 mg capsule TAKE 1 CAPSULE TWICE A DAY BY MOUTH FOR PAIN/ANTI-INFLAMMATORY, Historical Med      cyclobenzaprine (FLEXERIL) 10 mg tablet Take 1 tablet (10 mg total) by mouth 3 (three) times a day as needed for muscle spasms, Starting Tue 7/3/2018, Normal      diazepam (VALIUM) 5 mg tablet Take 1 tablet (5 mg total) by mouth every 8 (eight) hours as needed for anxiety, Starting Tue 6/29/2021, Normal      famotidine (PEPCID) 20 mg tablet 1 TWICE A DAY FOR ANTI-ACID, Historical Med      gabapentin (NEURONTIN) 300 mg capsule TAKE 1 CAPSULE 3X A DAY FOR NERVE PAIN, Historical Med      !! morphine (MSIR) 15 mg tablet Take 1 tablet (15 mg total) by mouth every 4 (four) hours as needed for severe pain for up to 10 doses Max Daily Amount: 90 mg, Starting Sun 2/13/2022, Print      neomycin-polymyxin-hydrocortisone (CORTISPORIN) 0 35%-10,000 units/mL-1% otic suspension Administer 4 drops to the right ear every 6 (six) hours for 5 days, Starting Wed 1/2/2019, Until Mon 1/7/2019, Normal      nystatin (MYCOSTATIN) cream Apply topically 2 (two) times a day for 7 days, Starting Wed 1/13/2021, Until Wed 1/20/2021, Normal      nystatin-triamcinolone (MYCOLOG-II) cream Apply topically 2 (two) times a day for 10 days, Starting Tue 6/29/2021, Until Fri 7/9/2021, Normal      oxyCODONE-acetaminophen (PERCOCET)  mg per tablet Take 1 tablet by mouth every 4 (four) hours as needed for moderate pain, Historical Med      predniSONE 20 mg tablet Please take 3 -20 mg tabs X3 days , Please take 2 - 20mg tabs X 3 days, Please take 1-20 mg tab X1 days, Normal      sildenafil (REVATIO) 20 mg tablet Take 1 tablet (20 mg total) by mouth 3 (three) times a day, Starting Fri 12/7/2018, Normal      traMADol (Ultram) 50 mg tablet Take 1 tablet (50 mg total) by mouth every 6 (six) hours as needed for moderate pain, Starting Wed 2/23/2022, Normal      valACYclovir (VALTREX) 1,000 mg tablet Take 1 tablet (1,000 mg total) by mouth 2 (two) times a day for 5 days, Starting Tue 2/27/2018, Until Wed 2/23/2022, Normal       !! - Potential duplicate medications found  Please discuss with provider  No discharge procedures on file      PDMP Review       Value Time User    PDMP Reviewed  Yes 3/5/2022  2:35 PM Darian Hernandez PA-C          ED Provider  Electronically Signed by           Darian Hernandez PA-C  03/05/22 3032

## 2022-03-05 NOTE — PROGRESS NOTES
Orthopedics   Cindi Toth 49 y o  male MRN: [id number]  Unit/Bed#: ED 12      Chief Complaint:   Left ring finger pain    HPI:  49 y o  male RHD complaining of left ring finger pain  Pain started after injury to left ring finger when a metal fan struck his hand on 2/13/22  He was seen at ED and laceration was washed out he was started on antibiotics  Seen again a few days later at ED found to have dorsal finger abscess near prior laceration and had bedside washout by ED again continued on Augmentin  States that he had not been taking the augmentin as scheduled for the past few days because he was feeling well  4 days ago he started to develop pain redness and swelling around the prior I&D site dorsal left ring finger distal phalanx  Pain acutely worsened last 24 hours which kept him up all night and so he presented to [facility] ED for re-evaluation today  Denies relevant medical or surgical history  No new numbness or tingling left long finger, no fevers chills nausea vomiting  Pain is:  throbbing in character  Located left ring finger distal phalanx  Acute in onset  constant in duration  severe in intensity  Exacerbating factors movement of left long finger or direct contact to the area  Remitting factors rest immobilization   no radiation  Associated symptoms redness and swelling around prior laceration from metal fan and subsequent I&D site      Review Of Systems:   · Skin: Normal  · Neuro: See HPI  · Musculoskeletal: See HPI  · 14 point review of systems negative except as stated above     Past Medical History:   Past Medical History:   Diagnosis Date    Back pain        Past Surgical History:   Past Surgical History:   Procedure Laterality Date    BACK SURGERY  09/2017    HERNIA REPAIR      INGUINAL    MASS EXCISION N/A 10/25/2017    Procedure: RIGHT BACK MASS EXCISION;  Surgeon: Michi Jordan MD;  Location: [facility];  Service: General    NECK SURGERY         Family History:  Family history reviewed and non-contributory  History reviewed  No pertinent family history      Social History:  Social History     Socioeconomic History    Marital status: /Civil Union     Spouse name: None    Number of children: None    Years of education: None    Highest education level: None   Occupational History    None   Tobacco Use    Smoking status: Former Smoker     Packs/day: 0 10    Smokeless tobacco: Never Used   Substance and Sexual Activity    Alcohol use: Yes     Comment: socially    Drug use: No    Sexual activity: None   Other Topics Concern    None   Social History Narrative    None     Social Determinants of Health     Financial Resource Strain: Not on file   Food Insecurity: Not on file   Transportation Needs: Not on file   Physical Activity: Not on file   Stress: Not on file   Social Connections: Not on file   Intimate Partner Violence: Not on file   Housing Stability: Not on file       Allergies:   No Known Allergies        Labs:  0   Lab Value Date/Time    HCT 41 9 02/13/2022 1155    HCT 44 6 01/13/2021 0953    HCT 39 0 02/21/2018 1209    HGB 14 4 02/13/2022 1155    HGB 15 3 01/13/2021 0953    HGB 13 7 02/21/2018 1209    WBC 12 22 (H) 02/13/2022 1155    WBC 7 96 01/13/2021 0953    WBC 6 22 02/21/2018 1209       Meds:    Current Facility-Administered Medications:     ceftriaxone (ROCEPHIN) 1 g/50 mL in dextrose IVPB, 1,000 mg, Intravenous, Once, Hernan Spurr, PA-C    ketorolac (TORADOL) injection 15 mg, 15 mg, Intravenous, Once, Hernan Spurr, PA-C    ondansetron [facility]) injection 4 mg, 4 mg, Intravenous, Once, Hernan Spurr, PA-C    vancomycin (VANCOCIN) IVPB (premix in dextrose) 1,000 mg 200 mL, 15 mg/kg, Intravenous, Once, Hernan Spurr, PA-C    Current Outpatient Medications:     celecoxib (CeleBREX) 200 mg capsule, TAKE 1 CAPSULE TWICE A DAY BY MOUTH FOR PAIN/ANTI-INFLAMMATORY, Disp: , Rfl:     cyclobenzaprine (FLEXERIL) 10 mg tablet, Take 1 tablet (10 mg total) by mouth 3 (three) times a day as needed for muscle spasms (Patient not taking: Reported on 12/26/2018 ), Disp: 30 tablet, Rfl: 2    diazepam (VALIUM) 5 mg tablet, Take 1 tablet (5 mg total) by mouth every 8 (eight) hours as needed for anxiety, Disp: 30 tablet, Rfl: 1    famotidine (PEPCID) 20 mg tablet, 1 TWICE A DAY FOR ANTI-ACID, Disp: , Rfl:     gabapentin (NEURONTIN) 300 mg capsule, TAKE 1 CAPSULE 3X A DAY FOR NERVE PAIN, Disp: , Rfl:     morphine (MSIR) 15 mg tablet, Take 1 tablet (15 mg total) by mouth every 4 (four) hours as needed for severe pain for up to 10 doses Max Daily Amount: 90 mg (Patient not taking: Reported on 2/23/2022 ), Disp: 10 tablet, Rfl: 0    neomycin-polymyxin-hydrocortisone (CORTISPORIN) 0 35%-10,000 units/mL-1% otic suspension, Administer 4 drops to the right ear every 6 (six) hours for 5 days, Disp: 10 mL, Rfl: 0    nystatin (MYCOSTATIN) cream, Apply topically 2 (two) times a day for 7 days, Disp: 30 g, Rfl: 1    nystatin-triamcinolone (MYCOLOG-II) cream, Apply topically 2 (two) times a day for 10 days, Disp: 120 g, Rfl: 1    oxyCODONE-acetaminophen (PERCOCET)  mg per tablet, Take 1 tablet by mouth every 4 (four) hours as needed for moderate pain (Patient not taking: Reported on 2/16/2022 ), Disp: , Rfl:     predniSONE 20 mg tablet, Please take 3 -20 mg tabs X3 days , Please take 2 - 20mg tabs X 3 days, Please take 1-20 mg tab X1 days (Patient not taking: Reported on 2/23/2022 ), Disp: 16 tablet, Rfl: 0    sildenafil (REVATIO) 20 mg tablet, Take 1 tablet (20 mg total) by mouth 3 (three) times a day (Patient not taking: Reported on 2/16/2022 ), Disp: 10 tablet, Rfl: 1    traMADol (Ultram) 50 mg tablet, Take 1 tablet (50 mg total) by mouth every 6 (six) hours as needed for moderate pain, Disp: 20 tablet, Rfl: 0    valACYclovir (VALTREX) 1,000 mg tablet, Take 1 tablet (1,000 mg total) by mouth 2 (two) times a day for 5 days, Disp: 10 tablet, Rfl: 0    Blood Culture:   No results found for: BLOODCX    Wound Culture:   Lab Results   Component Value Date    WOUNDCULT 2 colonies Enterobacter cloacae complex (A) 02/16/2022    WOUNDCULT 1 colony Staphylococcus aureus (A) 02/16/2022       Ins and Outs:  No intake/output data recorded  Physical Exam:   Vitals:    03/05/22 1037   BP: 136/87   Pulse: 77   Resp: 20   Temp: 97 7 °F (36 5 °C)   SpO2: 98%        Gen: Awake alert no acute distress  HEENT: Eyes clear, moist mucus membranes, hearing intact  Respiratory: Bilateral chest rise  No audible wheezing found  Cardiovascular: Regular Rate and Rhythm    Musculoskeletal: left upper extremity  · Skin warm dry there is erythema and swelling around a poorly healed laceration over distal phalanx dorsally about 1cm proximal to the eponychial fold  There is no active drainage  The area is mildly fluctuant  The eryethema extends circumferentially around distal phalanx  Tissues of ring finger pulp soft depressible nontender  No flexed posturing of digit no fusiform swelling  There is no hypermobility of the nail, no apparent collections underneath the nail or in sterile matrix  · TTP left ring finger dorsally around laceration and corresponding area of redness  No tenderness volarly over flexor tendon sheath there is no tenderness proximal to distal phalanx on ring finger  No tenderness in hand wrist forearm or elbow  Minimal TTP over nail of ring finger  · Full active & passive ROM at index long small and thumb, as well as wrist and elbow without pain  FDS and FDP as well as extensor tendon intact to ring finger with isolated testing  There is no pain with passive motion of left ring finger DIP joint  · SILT m/r/u  Intact to radial and ulnar side of ring finger  · Motor intact ain/pin/m/r/u  · 2+ rad pulse, brisk cap refill to ring finger      Radiology:   I personally reviewed the radiographic studies, my interpretation is as follows:  Xray AP lateral oblique left hand shows no fracture dislocation or foreign body  Procedure:   Bedside incision and drainage and application soft dressing  After local anesthesia obtained with 1% lidocaine without epi digital block ring finger, skin prepped with betadine  the prior dorsal distal phalanx insision was opened up bluntly with hemostat and mild amoutn of purulent collection was expressed  Loculations were bluntly broken up and tissues manually expressed to remove all purulence  2L NS spiked with betadine utilized to washout the left ring finger wound  Sterile dressing applied with xeroform gauze rhiannon and ace wrap  Tolerated the procedure well without complication     _*_*_*_*_*_*_*_*_*_*_*_*_*_*_*_*_*_*_*_*_*_*_*_*_*_*_*_*_*_*_*_*_*_*_*_*_*_*_*_*_*    Assessment:  56 y o male left ring finger purulent cellulitis, recurrent with early self discontinuation of outpatient antibiotic regimen  Plan:   · NWB LUE in soft dressing  · Bactrim DS x 10 days for failure of prior outpatient regimen  · Personally provided instructions to patient regarding three times daily soapy warm water soaks to allow for continued wound drainage  15 minutes per soak  Instructed to reapply a sterile bandage after each soak  · Patient should return to ED for recheck if his symptoms fail to improve or worsen, he develops numbness or tingling to left ring finger, or if he develops systemic symptoms such as fevers chills spreading redness up arm  · Follow up 1 week outpatient with Dr Bernadette Brady for wound check  · Body mass index is 24 46 kg/m²     · Dispo: OK for discharge from orthopedic perspective with close outpatient followup    Rupert Favre, MD

## 2022-03-05 NOTE — CONSULTS
Orthopedics   Marce Lyle 49 y o  male MRN: [id number]  Unit/Bed#: ED 12      Chief Complaint:   Left ring finger pain    HPI:  49 y o  male RHD complaining of left ring finger pain  Pain started after injury to left ring finger when a metal fan struck his hand on 2/13/22  He was seen at ED and laceration was washed out he was started on antibiotics  Seen again a few days later at ED found to have dorsal finger abscess near prior laceration and had bedside washout by ED again continued on Augmentin  States that he had not been taking the augmentin as scheduled for the past few days because he was feeling well  4 days ago he started to develop pain redness and swelling around the prior I&D site dorsal left ring finger distal phalanx  Pain acutely worsened last 24 hours which kept him up all night and so he presented to [facility] ED for re-evaluation today  Denies relevant medical or surgical history  No new numbness or tingling left long finger, no fevers chills nausea vomiting  Pain is:  throbbing in character  Located left ring finger distal phalanx  Acute in onset  constant in duration  severe in intensity  Exacerbating factors movement of left long finger or direct contact to the area  Remitting factors rest immobilization   no radiation  Associated symptoms redness and swelling around prior laceration from metal fan and subsequent I&D site      Review Of Systems:   · Skin: Normal  · Neuro: See HPI  · Musculoskeletal: See HPI  · 14 point review of systems negative except as stated above     Past Medical History:   Past Medical History:   Diagnosis Date    Back pain        Past Surgical History:   Past Surgical History:   Procedure Laterality Date    BACK SURGERY  09/2017    HERNIA REPAIR      INGUINAL    MASS EXCISION N/A 10/25/2017    Procedure: RIGHT BACK MASS EXCISION;  Surgeon: Eddie Price MD;  Location: [facility];  Service: General    NECK SURGERY         Family History:  Family history reviewed and non-contributory  History reviewed  No pertinent family history      Social History:  Social History     Socioeconomic History    Marital status: /Civil Union     Spouse name: None    Number of children: None    Years of education: None    Highest education level: None   Occupational History    None   Tobacco Use    Smoking status: Former Smoker     Packs/day: 0 10    Smokeless tobacco: Never Used   Substance and Sexual Activity    Alcohol use: Yes     Comment: socially    Drug use: No    Sexual activity: None   Other Topics Concern    None   Social History Narrative    None     Social Determinants of Health     Financial Resource Strain: Not on file   Food Insecurity: Not on file   Transportation Needs: Not on file   Physical Activity: Not on file   Stress: Not on file   Social Connections: Not on file   Intimate Partner Violence: Not on file   Housing Stability: Not on file       Allergies:   No Known Allergies        Labs:  0   Lab Value Date/Time    HCT 41 9 02/13/2022 1155    HCT 44 6 01/13/2021 0953    HCT 39 0 02/21/2018 1209    HGB 14 4 02/13/2022 1155    HGB 15 3 01/13/2021 0953    HGB 13 7 02/21/2018 1209    WBC 12 22 (H) 02/13/2022 1155    WBC 7 96 01/13/2021 0953    WBC 6 22 02/21/2018 1209       Meds:    Current Facility-Administered Medications:     ceftriaxone (ROCEPHIN) 1 g/50 mL in dextrose IVPB, 1,000 mg, Intravenous, Once, MONIK Bae-LEEANN    ketorolac (TORADOL) injection 15 mg, 15 mg, Intravenous, Once, Ginger Sexton PA-LEEANN    ondansetron [facility]) injection 4 mg, 4 mg, Intravenous, Once, Ginger Sexton PA-C    vancomycin (VANCOCIN) IVPB (premix in dextrose) 1,000 mg 200 mL, 15 mg/kg, Intravenous, Once, Ginger Sexton PA-C    Current Outpatient Medications:     celecoxib (CeleBREX) 200 mg capsule, TAKE 1 CAPSULE TWICE A DAY BY MOUTH FOR PAIN/ANTI-INFLAMMATORY, Disp: , Rfl:     cyclobenzaprine (FLEXERIL) 10 mg tablet, Take 1 tablet (10 mg total) by mouth 3 (three) times a day as needed for muscle spasms (Patient not taking: Reported on 12/26/2018 ), Disp: 30 tablet, Rfl: 2    diazepam (VALIUM) 5 mg tablet, Take 1 tablet (5 mg total) by mouth every 8 (eight) hours as needed for anxiety, Disp: 30 tablet, Rfl: 1    famotidine (PEPCID) 20 mg tablet, 1 TWICE A DAY FOR ANTI-ACID, Disp: , Rfl:     gabapentin (NEURONTIN) 300 mg capsule, TAKE 1 CAPSULE 3X A DAY FOR NERVE PAIN, Disp: , Rfl:     morphine (MSIR) 15 mg tablet, Take 1 tablet (15 mg total) by mouth every 4 (four) hours as needed for severe pain for up to 10 doses Max Daily Amount: 90 mg (Patient not taking: Reported on 2/23/2022 ), Disp: 10 tablet, Rfl: 0    neomycin-polymyxin-hydrocortisone (CORTISPORIN) 0 35%-10,000 units/mL-1% otic suspension, Administer 4 drops to the right ear every 6 (six) hours for 5 days, Disp: 10 mL, Rfl: 0    nystatin (MYCOSTATIN) cream, Apply topically 2 (two) times a day for 7 days, Disp: 30 g, Rfl: 1    nystatin-triamcinolone (MYCOLOG-II) cream, Apply topically 2 (two) times a day for 10 days, Disp: 120 g, Rfl: 1    oxyCODONE-acetaminophen (PERCOCET)  mg per tablet, Take 1 tablet by mouth every 4 (four) hours as needed for moderate pain (Patient not taking: Reported on 2/16/2022 ), Disp: , Rfl:     predniSONE 20 mg tablet, Please take 3 -20 mg tabs X3 days , Please take 2 - 20mg tabs X 3 days, Please take 1-20 mg tab X1 days (Patient not taking: Reported on 2/23/2022 ), Disp: 16 tablet, Rfl: 0    sildenafil (REVATIO) 20 mg tablet, Take 1 tablet (20 mg total) by mouth 3 (three) times a day (Patient not taking: Reported on 2/16/2022 ), Disp: 10 tablet, Rfl: 1    traMADol (Ultram) 50 mg tablet, Take 1 tablet (50 mg total) by mouth every 6 (six) hours as needed for moderate pain, Disp: 20 tablet, Rfl: 0    valACYclovir (VALTREX) 1,000 mg tablet, Take 1 tablet (1,000 mg total) by mouth 2 (two) times a day for 5 days, Disp: 10 tablet, Rfl: 0    Blood Culture:   No results found for: BLOODCX    Wound Culture:   Lab Results   Component Value Date    WOUNDCULT 2 colonies Enterobacter cloacae complex (A) 02/16/2022    WOUNDCULT 1 colony Staphylococcus aureus (A) 02/16/2022       Ins and Outs:  No intake/output data recorded  Physical Exam:   Vitals:    03/05/22 1037   BP: 136/87   Pulse: 77   Resp: 20   Temp: 97 7 °F (36 5 °C)   SpO2: 98%        Gen: Awake alert no acute distress  HEENT: Eyes clear, moist mucus membranes, hearing intact  Respiratory: Bilateral chest rise  No audible wheezing found  Cardiovascular: Regular Rate and Rhythm    Musculoskeletal: left upper extremity  · Skin warm dry there is erythema and swelling around a poorly healed laceration over distal phalanx dorsally about 1cm proximal to the eponychial fold  There is no active drainage  The area is mildly fluctuant  The eryethema extends circumferentially around distal phalanx  Tissues of ring finger pulp soft depressible nontender  No flexed posturing of digit no fusiform swelling  There is no hypermobility of the nail, no apparent collections underneath the nail or in sterile matrix  · TTP left ring finger dorsally around laceration and corresponding area of redness  No tenderness volarly over flexor tendon sheath there is no tenderness proximal to distal phalanx on ring finger  No tenderness in hand wrist forearm or elbow  Minimal TTP over nail of ring finger  · Full active & passive ROM at index long small and thumb, as well as wrist and elbow without pain  FDS and FDP as well as extensor tendon intact to ring finger with isolated testing  There is no pain with passive motion of left ring finger DIP joint  · SILT m/r/u  Intact to radial and ulnar side of ring finger  · Motor intact ain/pin/m/r/u  · 2+ rad pulse, brisk cap refill to ring finger      Radiology:   I personally reviewed the radiographic studies, my interpretation is as follows:  Xray AP lateral oblique left hand shows no fracture dislocation or foreign body  Procedure:   Bedside incision and drainage and application soft dressing  After local anesthesia obtained with 1% lidocaine without epi digital block ring finger, skin prepped with betadine  the prior dorsal distal phalanx insision was opened up bluntly with hemostat and mild amoutn of purulent collection was expressed  Loculations were bluntly broken up and tissues manually expressed to remove all purulence  2L NS spiked with betadine utilized to washout the left ring finger wound  Sterile dressing applied with xeroform gauze rhiannon and ace wrap  Tolerated the procedure well without complication     _*_*_*_*_*_*_*_*_*_*_*_*_*_*_*_*_*_*_*_*_*_*_*_*_*_*_*_*_*_*_*_*_*_*_*_*_*_*_*_*_*    Assessment:  56 y o male left ring finger purulent cellulitis, recurrent with early self discontinuation of outpatient antibiotic regimen  Plan:   · NWB LUE in soft dressing  · Bactrim DS x 10 days for failure of prior outpatient regimen  · Personally provided instructions to patient regarding three times daily soapy warm water soaks to allow for continued wound drainage  15 minutes per soak  Instructed to reapply a sterile bandage after each soak  · Patient should return to ED for recheck if his symptoms fail to improve or worsen, he develops numbness or tingling to left ring finger, or if he develops systemic symptoms such as fevers chills spreading redness up arm  · Follow up 1 week outpatient with Dr Baljit Orozco for wound check  · Body mass index is 24 46 kg/m²     · Dispo: OK for discharge from orthopedic perspective with close outpatient followup    Kimberlyn Salter MD

## 2022-03-07 ENCOUNTER — OFFICE VISIT (OUTPATIENT)
Dept: OCCUPATIONAL THERAPY | Age: 57
End: 2022-03-07
Payer: MEDICARE

## 2022-03-07 ENCOUNTER — OFFICE VISIT (OUTPATIENT)
Dept: PHYSICAL THERAPY | Age: 57
End: 2022-03-07
Payer: OTHER MISCELLANEOUS

## 2022-03-07 DIAGNOSIS — M54.16 LUMBAR RADICULOPATHY: Primary | ICD-10-CM

## 2022-03-07 DIAGNOSIS — M79.641 RIGHT HAND PAIN: Primary | ICD-10-CM

## 2022-03-07 PROCEDURE — 97140 MANUAL THERAPY 1/> REGIONS: CPT

## 2022-03-07 PROCEDURE — 97112 NEUROMUSCULAR REEDUCATION: CPT | Performed by: SPECIALIST/TECHNOLOGIST

## 2022-03-07 PROCEDURE — 97110 THERAPEUTIC EXERCISES: CPT | Performed by: SPECIALIST/TECHNOLOGIST

## 2022-03-07 PROCEDURE — 97610 LOW FREQUENCY NON-THERMAL US: CPT

## 2022-03-07 NOTE — PROGRESS NOTES
Daily Note     Today's date: 3/7/2022  Patient name: Joesph Torres  : 1965  MRN: [id number]  Referring provider: Brian Darby MD  Dx:   Encounter Diagnosis     ICD-10-CM    1  Lumbar radiculopathy  M54 16                   Subjective: No changes to report since IE  Stiff today  Objective: See treatment diary below    Assessment: Pt requires VC to fire TA prior to exercise initiation- no reproduction of LBP or radicular sx with therex assigned this visit  Tolerated treatment well  Patient demonstrated fatigue post treatment, exhibited good technique with therapeutic exercises and would benefit from continued PT    Plan: Continue per plan of care  Progress treatment as tolerated            Precautions: Hx of cervical/lumbar fusion    Manuals   3/7                                                               Neuro Re-Ed                          TA with LE Ext  3# 3x10            Supine TA P Ball Press  20x 5s           TA Press Downs L/R/C  15x ea           Press Outs  8# 20x            Prone Alt UE/LE  30x                                     Ther Ex             Bike  L3 8'            SNAGS  5x 20s ea           Bridges  30x                         Leg Press  70# 30x           Hip Abd  40# 30x           Sled Push/Pull  35# 3x 50ft                                                                            Ther Activity                                       Gait Training                                       Modalities

## 2022-03-07 NOTE — PROGRESS NOTES
Daily Note     Today's date: 3/7/2022  Patient name: Sonia Koo  : 1965  MRN: [id number]  Referring provider: Gloria Melendez MD  Dx:   Encounter Diagnosis     ICD-10-CM    1  Right hand pain  M79 641                   Subjective: I want you to look at my other hand    Objective: See treatment diary below      Assessment: Tolerated treatment well  Patient would benefit from continued OT  Presented with multiple layers of coban over R hand wound resulting in increased maceration of wound  Again recommended opening wound to air at home  He reports presenting to ER on Saturday secondary to increased pain, swelling, and feeling sick for his IF on his L hand  He continues to have increased redness and swelling however no warmth noted today  Completed MIST today on this wound and also instructed on leaving wound open to air to decrease maceration  Instructed him to call MD office in order to make a follow up appt  Plan: Continue per plan of care        Precautions: Wound      Manuals 3/1 3/3 3/7          MIST 3' Performed Performed          Wound Care 10' 10' 10'                                    Neuro Re-Ed                                                                                                        Ther Ex                                                                               '                                      Ther Activity                                       Gait Training                                       Modalities

## 2022-03-10 LAB
BACTERIA BLD CULT: NORMAL
BACTERIA BLD CULT: NORMAL

## 2022-03-17 ENCOUNTER — APPOINTMENT (OUTPATIENT)
Dept: PHYSICAL THERAPY | Age: 57
End: 2022-03-17
Payer: OTHER MISCELLANEOUS

## 2022-03-21 ENCOUNTER — APPOINTMENT (OUTPATIENT)
Dept: PHYSICAL THERAPY | Age: 57
End: 2022-03-21
Payer: OTHER MISCELLANEOUS

## 2022-03-21 ENCOUNTER — OFFICE VISIT (OUTPATIENT)
Dept: OCCUPATIONAL THERAPY | Age: 57
End: 2022-03-21
Payer: MEDICARE

## 2022-03-21 ENCOUNTER — OFFICE VISIT (OUTPATIENT)
Dept: PHYSICAL THERAPY | Age: 57
End: 2022-03-21
Payer: OTHER MISCELLANEOUS

## 2022-03-21 DIAGNOSIS — M79.641 RIGHT HAND PAIN: Primary | ICD-10-CM

## 2022-03-21 DIAGNOSIS — M54.16 LUMBAR RADICULOPATHY: Primary | ICD-10-CM

## 2022-03-21 PROCEDURE — 97610 LOW FREQUENCY NON-THERMAL US: CPT

## 2022-03-21 PROCEDURE — 97112 NEUROMUSCULAR REEDUCATION: CPT | Performed by: SPECIALIST/TECHNOLOGIST

## 2022-03-21 PROCEDURE — 97140 MANUAL THERAPY 1/> REGIONS: CPT

## 2022-03-21 PROCEDURE — 97110 THERAPEUTIC EXERCISES: CPT | Performed by: SPECIALIST/TECHNOLOGIST

## 2022-03-21 NOTE — PROGRESS NOTES
Daily Note     Today's date: 3/21/2022  Patient name: Anyi Urrutia  : 1965  MRN: [id number]  Referring provider: Cayla Green MD  Dx:   Encounter Diagnosis     ICD-10-CM    1  Lumbar radiculopathy  M54 16                   Subjective: Diffuse LBP persists and intermittent RLe sciatic sx extending into buttock  Objective: See treatment diary below    Assessment: No progressions to POC this visit given inconsistent PT attendance recently  No reproduction of LBP or radicular sx with therex assigned this visit  Tolerated treatment well  Patient demonstrated fatigue post treatment, exhibited good technique with therapeutic exercises and would benefit from continued PT    Plan: Continue per plan of care  Progress treatment as tolerated         Precautions: Hx of cervical/lumbar fusion    Manuals   3/7 3/21                                                              Neuro Re-Ed                          TA with LE Ext  3# 3x10  3# 3x10          Supine TA P Ball Press  20x 5s 20x5s           TA Press Downs L/R/C  15x ea 15x ea          Press Outs  8# 20x  8# 20x ea          Prone Alt UE/LE  30x 30x                                    Ther Ex             Bike  L3 8'  10'          SNAGS  5x 20s ea 5x20s ea          Bridges  30x  30x                        Leg Press  70# 30x 70# 30x          Hip Abd  40# 30x 40# 30x          Sled Push/Pull  35# 3x 50ft 35# 5x 50ft                                                                           Ther Activity                                       Gait Training                                       Modalities

## 2022-03-21 NOTE — PROGRESS NOTES
Daily Note     Today's date: 3/21/2022  Patient name: Leopoldo Blaze  : 1965  MRN: [id number]  Referring provider: Emile Venegas MD  Dx:   Encounter Diagnosis     ICD-10-CM    1  Right hand pain  M79 641                   Subjective: I want you to look at my other hand    Objective: See treatment diary below      Assessment: Tolerated treatment well  Patient would benefit from continued OT  Great improvement of wound since last visit  No maceration noted  ROM continues to be [facility]    Plan: Continue per plan of care        Precautions: Wound      Manuals 3/1 3/3 3/7 3/21         MIST 3' Performed Performed Performed         Wound Care 10' 10' 10' 10'                                   Neuro Re-Ed                                                                                                        Ther Ex                                                                               '                                      Ther Activity                                       Gait Training                                       Modalities

## 2022-03-24 ENCOUNTER — APPOINTMENT (OUTPATIENT)
Dept: OCCUPATIONAL THERAPY | Age: 57
End: 2022-03-24
Payer: MEDICARE

## 2022-03-24 ENCOUNTER — OFFICE VISIT (OUTPATIENT)
Dept: PHYSICAL THERAPY | Age: 57
End: 2022-03-24
Payer: OTHER MISCELLANEOUS

## 2022-03-24 ENCOUNTER — APPOINTMENT (OUTPATIENT)
Dept: PHYSICAL THERAPY | Age: 57
End: 2022-03-24
Payer: OTHER MISCELLANEOUS

## 2022-03-24 DIAGNOSIS — M54.16 LUMBAR RADICULOPATHY: Primary | ICD-10-CM

## 2022-03-24 PROCEDURE — 97110 THERAPEUTIC EXERCISES: CPT | Performed by: PHYSICAL THERAPY ASSISTANT

## 2022-03-24 PROCEDURE — 97112 NEUROMUSCULAR REEDUCATION: CPT | Performed by: PHYSICAL THERAPY ASSISTANT

## 2022-03-24 NOTE — PROGRESS NOTES
Daily Note     Today's date: 3/24/2022  Patient name: Angelita Wiggins  : 1965  MRN: [id number]  Referring provider: Armin Goncalves MD  Dx:   Encounter Diagnosis     ICD-10-CM    1  Lumbar radiculopathy  M54 16                   Subjective: Pt arrived to therapy 40 min late  Accommodated for shortened session  Pt states he stayed in [place] last night due to having a dr appt there to get injections in his back    Pt states he is having increased pain since receiving the injections  Objective: See treatment diary below    Assessment: No progressions to POC this visit given inconsistent PT attendance recently  Tolerated treatment well  Patient demonstrated fatigue post treatment, exhibited good technique with therapeutic exercises and would benefit from continued PT  Discussed with pt the importance of being on time for appt and being consistent with attendance to see progress as pt is frequently late or does not show up for his appts  Pt states he is aware and will try to do better  Plan: Continue per plan of care  Progress treatment as tolerated         Precautions: Hx of cervical/lumbar fusion    Manuals   3/7 3/21 3/24                                                             Neuro Re-Ed                          TA with LE Ext  3# 3x10  3# 3x10 3#  3x10         Supine TA P Ball Press  20x 5s 20x5s  5"x20         TA Press Downs L/R/C  15x ea 15x ea          Press Outs  8# 20x  8# 20x ea          Prone Alt UE/LE  30x 30x 30x                                   Ther Ex             Bike  L3 8'  10' 10'         SNAGS  5x 20s ea 5x20s ea          Bridges  30x  30x  30x                      Leg Press  70# 30x 70# 30x 70#x30         Hip Abd  40# 30x 40# 30x 40# x30         Sled Push/Pull  35# 3x 50ft 35# 5x 50ft 35# 5x50'                                                                          Ther Activity                                       Gait Training Modalities

## 2022-03-28 ENCOUNTER — OFFICE VISIT (OUTPATIENT)
Dept: PHYSICAL THERAPY | Age: 57
End: 2022-03-28
Payer: OTHER MISCELLANEOUS

## 2022-03-28 DIAGNOSIS — M53.9 CERVICAL DYSFUNCTION: Primary | ICD-10-CM

## 2022-03-28 DIAGNOSIS — M54.16 LUMBAR RADICULOPATHY: ICD-10-CM

## 2022-03-28 PROCEDURE — 97110 THERAPEUTIC EXERCISES: CPT | Performed by: PHYSICAL THERAPIST

## 2022-03-28 PROCEDURE — 97164 PT RE-EVAL EST PLAN CARE: CPT | Performed by: PHYSICAL THERAPIST

## 2022-03-28 PROCEDURE — 97140 MANUAL THERAPY 1/> REGIONS: CPT | Performed by: PHYSICAL THERAPIST

## 2022-03-28 NOTE — PROGRESS NOTES
PT Evaluation     Today's date: 3/28/2022  Patient name: Leopoldo Blaze  : 1965  MRN: [id number]  Referring provider: Chiki Juarez MD  Dx:   Encounter Diagnosis     ICD-10-CM    1  Cervical dysfunction  M53 9    2  Lumbar radiculopathy  M54 16                   Assessment  Assessment details: Since IE, pt reports overall improvement with lumbar ROM  Pt has improved activity tolerance and minimal difficulty with ADLs due to lumbar spine  Pt continues to have pain with ADLs in cervical spine and would like to transition to PT program focusing on this  Pt would benefit from continued PT to address deficits in C spine ROM  Impairments: abnormal coordination, abnormal muscle firing, abnormal muscle tone, abnormal or restricted ROM, activity intolerance, impaired physical strength, lacks appropriate home exercise program, pain with function and poor body mechanics    Goals  In 4 weeks pt will:  -Be independent with phase I of HEP  -Increase UE strength by 1/2 grade  -Increase cervical ROM by 25%    By discharge pt will:  -Be independent with Phase II of HEP  -Demonstrate full UE strength  -Demonstrate full cervical ROM  -Report minimal pain with ADLs    Plan  Patient would benefit from: skilled physical therapy  Planned therapy interventions: joint mobilization, manual therapy, abdominal trunk stabilization, motor coordination training, neuromuscular re-education, patient education, strengthening, stretching, therapeutic activities, therapeutic exercise, functional ROM exercises and home exercise program  Frequency: 2x week  Duration in weeks: 6  Plan of Care beginning date: 3/28/2022  Plan of Care expiration date: 2022  Treatment plan discussed with: patient and PTA        Subjective Evaluation    History of Present Illness  Mechanism of injury: Pt reports to PT with cc of lumbar and cervical pain that began following work related incident   Pt Cannot recall specific date of incident, but has history of cervical and lumbar fusion "2-3" years ago  Objective     Active Range of Motion     Additional Active Range of Motion Details  Lumbar ROM as % of normal ROM    Flex:100  Ext:100  R ROT:100  L ROT:100      Passive Range of Motion   Cervical/Thoracic Spine   Cervical     Flexion (degrees):  WFL  Extension (degrees): 15  Left rotation (degrees): 40  Right rotation (degrees): 50    Strength/Myotome Testing     Left Hip   Planes of Motion   Abduction: 4    Right Hip   Planes of Motion   Abduction: 4    Left Knee   Flexion: 3+  Extension: 5    Right Knee   Flexion: 4  Extension: 5    Left Ankle/Foot   Dorsiflexion: 5  Plantar flexion: 5    Right Ankle/Foot   Dorsiflexion: 5  Plantar flexion: 5    Tests     Left Shoulder   Negative ULTT1 and ULTT4  Right Shoulder   Negative ULTT1 and ULTT4                Precautions: Hx of cervical/lumbar fusion      Manuals 3/28            Cervical PROM PUCKETT            UT stretch PUCKETT            Pec stretch PUCKETT                         Neuro Re-Ed                                                                                                        Ther Ex             bike 10' dc           SNAGS  x20 ea           Cervical isometrics  5" 2x10 B ROT           Rows/ext             t-spine ext over chair             Y/T/I                                       Ther Activity                                       Gait Training                                       Modalities

## 2022-03-31 ENCOUNTER — OFFICE VISIT (OUTPATIENT)
Dept: PHYSICAL THERAPY | Age: 57
End: 2022-03-31
Payer: OTHER MISCELLANEOUS

## 2022-03-31 DIAGNOSIS — M54.16 LUMBAR RADICULOPATHY: ICD-10-CM

## 2022-03-31 DIAGNOSIS — M53.9 CERVICAL DYSFUNCTION: Primary | ICD-10-CM

## 2022-03-31 PROCEDURE — 97110 THERAPEUTIC EXERCISES: CPT

## 2022-03-31 PROCEDURE — 97140 MANUAL THERAPY 1/> REGIONS: CPT

## 2022-03-31 NOTE — PROGRESS NOTES
Daily Note     Today's date: 3/31/2022  Patient name: Christopher Sam  : 1965  MRN: [id number]  Referring provider: Nimisha Kidd MD  Dx:   Encounter Diagnosis     ICD-10-CM    1  Cervical dysfunction  M53 9    2  Lumbar radiculopathy  M54 16                   Subjective: Pt arrived 15 minutes late today and was accommodated  Pt states he feels since he got his injections he has been feeling worse in his neck and back  Pt states he really does not want to get any more injections  Objective: See treatment diary below      Assessment: Tolerated treatment fair as he is limited by ROM and over all cervical control  Pt require max VC with exercises to improve posture and decrease force  Pt had slight decreased discomfort and improved ROM post manual PT  Patient would benefit from continued PT      Plan: Continue per plan of care  continue focusing on Cervical treatment        Precautions: Hx of cervical/lumbar fusion      Manuals 3/28 3/31           Cervical PROM PUCKETT CF           UT stretch PUCKETT CF           Pec stretch PUCKETT CF                        Neuro Re-Ed             Supine chin tucks   5" x 10                                                                                          Ther Ex             bike 10' 10' DC NV UBE           SNAGS  x20 ea           Cervical isometrics  5" 2x10 B ROT           Rows/ext  BTB 5" x 20            t-spine ext over chair  10 x10           Y/T/I  nv                                     Ther Activity                                       Gait Training                                       Modalities

## 2022-04-04 ENCOUNTER — APPOINTMENT (OUTPATIENT)
Dept: PHYSICAL THERAPY | Age: 57
End: 2022-04-04
Payer: OTHER MISCELLANEOUS

## 2022-04-07 ENCOUNTER — OFFICE VISIT (OUTPATIENT)
Dept: PHYSICAL THERAPY | Age: 57
End: 2022-04-07
Payer: OTHER MISCELLANEOUS

## 2022-04-07 DIAGNOSIS — M54.16 LUMBAR RADICULOPATHY: ICD-10-CM

## 2022-04-07 DIAGNOSIS — M53.9 CERVICAL DYSFUNCTION: Primary | ICD-10-CM

## 2022-04-07 PROCEDURE — 97110 THERAPEUTIC EXERCISES: CPT | Performed by: SPECIALIST/TECHNOLOGIST

## 2022-04-07 PROCEDURE — 97140 MANUAL THERAPY 1/> REGIONS: CPT | Performed by: SPECIALIST/TECHNOLOGIST

## 2022-04-07 NOTE — PROGRESS NOTES
Daily Note     Today's date: 2022  Patient name: Bird Poe  : 1965  MRN: [id number]  Referring provider: Ruthie Hays MD  Dx:   Encounter Diagnosis     ICD-10-CM    1  Cervical dysfunction  M53 9    2  Lumbar radiculopathy  M54 16                   Subjective: Pt reports he's awaiting confirmation for epidural injections  Objective: See treatment diary below    Assessment: Pt requires frequent cues for appropriate exercise application and reminders for gentle stretches and good target muscle control through both concentric and eccentric phases of therex  Tolerated treatment well  Patient demonstrated fatigue post treatment, exhibited good technique with therapeutic exercises and would benefit from continued PT to improve cervical dysfunctional and posture  Plan: Continue per plan of care  Progress treatment as tolerated              Precautions: Hx of cervical/lumbar fusion      Manuals 3/28 3/31 4/7          Cervical PROM PUCKETT CF KM          UT stretch PUCKETT CF KM          Pec stretch PUCKETT CF Corner Pec Stretch 5x30s                       Neuro Re-Ed             Supine chin tucks   5" x 10  5" x20                                                                                        Ther Ex             bike 10' 10' DC NV UBE 5/5          SNAGS  x20 ea x20 ea          Cervical isometrics  5" 2x10 B ROT 5" 20x rot, UT          Rows/ext  BTB 5" x 20  BTB 3x12          t-spine ext over chair  10 x10 10x10          Y/T/I  nv 20x ea                                    Ther Activity                                       Gait Training                                       Modalities

## 2022-04-11 ENCOUNTER — OFFICE VISIT (OUTPATIENT)
Dept: PHYSICAL THERAPY | Age: 57
End: 2022-04-11
Payer: OTHER MISCELLANEOUS

## 2022-04-11 DIAGNOSIS — M53.9 CERVICAL DYSFUNCTION: Primary | ICD-10-CM

## 2022-04-11 PROCEDURE — 97140 MANUAL THERAPY 1/> REGIONS: CPT | Performed by: SPECIALIST/TECHNOLOGIST

## 2022-04-11 PROCEDURE — 97110 THERAPEUTIC EXERCISES: CPT | Performed by: SPECIALIST/TECHNOLOGIST

## 2022-04-11 NOTE — PROGRESS NOTES
Daily Note     Today's date: 2022  Patient name: Akash Salvador  : 1965  MRN: [id number]  Referring provider: Cali Kc MD  Dx:   Encounter Diagnosis     ICD-10-CM    1  Cervical dysfunction  M53 9                   Subjective: Pt describes muscular soreness with new therex introduced last visit  Objective: See treatment diary below    Assessment: Pt continues to have pain with end range cervical rotation PROM- would benefit from continued manual interventions to improve pain free cervical ROM  Tolerated treatment well  Patient demonstrated fatigue post treatment, exhibited good technique with therapeutic exercises and would benefit from continued PT    Plan: Continue per plan of care  Progress treatment as tolerated         Precautions: Hx of cervical/lumbar fusion    Manuals 3/28 3/31 4/7 4/11         Cervical PROM PUCKETT CF KM Rotation KM         UT stretch PUCKETT CF KM KM         Pec stretch PUCKETT CF Corner Pec Stretch 5x30s Corner Pec Stretch 5x30s                      Neuro Re-Ed             Supine chin tucks   5" x 10  5" x20 5" x30                                                                                       Ther Ex             bike 10' 10' DC NV UBE 5/5 UBE 5/5         SNAGS  x20 ea x20 ea x20 ea         Cervical isometrics  5" 2x10 B ROT 5" 20x rot, UT 5" 20x B Rot         Rows/ext  BTB 5" x 20  BTB 3x12 BTB 3x12         t-spine ext over chair  10 x10 10x10 10x10          Y/T/I  nv 20x ea 20x ea                                   Ther Activity                                       Gait Training                                       Modalities

## 2022-04-14 ENCOUNTER — APPOINTMENT (OUTPATIENT)
Dept: PHYSICAL THERAPY | Age: 57
End: 2022-04-14
Payer: OTHER MISCELLANEOUS

## 2022-04-18 ENCOUNTER — APPOINTMENT (OUTPATIENT)
Dept: PHYSICAL THERAPY | Age: 57
End: 2022-04-18
Payer: OTHER MISCELLANEOUS

## 2022-04-21 ENCOUNTER — APPOINTMENT (OUTPATIENT)
Dept: PHYSICAL THERAPY | Age: 57
End: 2022-04-21
Payer: OTHER MISCELLANEOUS

## 2022-07-07 ENCOUNTER — TELEPHONE (OUTPATIENT)
Dept: OBGYN CLINIC | Facility: HOSPITAL | Age: 57
End: 2022-07-07

## 2022-07-15 ENCOUNTER — RA CDI HCC (OUTPATIENT)
Dept: OTHER | Facility: HOSPITAL | Age: 57
End: 2022-07-15

## 2022-07-15 NOTE — PROGRESS NOTES
[address]  coding opportunities       Chart reviewed, no opportunity found: CHART REVIEWED, NO OPPORTUNITY FOUND        Patients Insurance     Medicare Insurance: Medicare

## 2022-07-23 ENCOUNTER — OFFICE VISIT (OUTPATIENT)
Dept: URGENT CARE | Facility: CLINIC | Age: 57
End: 2022-07-23
Payer: MEDICARE

## 2022-07-23 VITALS — RESPIRATION RATE: 18 BRPM | HEART RATE: 99 BPM | TEMPERATURE: 98 F | OXYGEN SATURATION: 98 %

## 2022-07-23 DIAGNOSIS — Z76.0 MEDICATION REFILL: ICD-10-CM

## 2022-07-23 DIAGNOSIS — M79.671 RIGHT FOOT PAIN: Primary | ICD-10-CM

## 2022-07-23 DIAGNOSIS — T14.8XXA PUNCTURE WOUND: ICD-10-CM

## 2022-07-23 PROCEDURE — 99213 OFFICE O/P EST LOW 20 MIN: CPT | Performed by: PHYSICIAN ASSISTANT

## 2022-07-23 PROCEDURE — G0463 HOSPITAL OUTPT CLINIC VISIT: HCPCS | Performed by: PHYSICIAN ASSISTANT

## 2022-07-23 RX ORDER — CELECOXIB 200 MG/1
200 CAPSULE ORAL 2 TIMES DAILY
Qty: 14 CAPSULE | Refills: 0 | Status: SHIPPED | OUTPATIENT
Start: 2022-07-23 | End: 2022-07-30

## 2022-07-23 RX ORDER — CIPROFLOXACIN 500 MG/1
500 TABLET, FILM COATED ORAL EVERY 12 HOURS SCHEDULED
Qty: 14 TABLET | Refills: 0 | Status: SHIPPED | OUTPATIENT
Start: 2022-07-23 | End: 2022-07-30

## 2022-07-23 NOTE — PROGRESS NOTES
[facility]        NAME: Blayne Sunshine is a 49 y o  male  : 1965    MRN: [id number]  DATE: 2022  TIME: 10:06 AM    Assessment and Plan   Right foot pain [M79 671]  1  Right foot pain  celecoxib (CeleBREX) 200 mg capsule   2  Puncture wound  ciprofloxacin (CIPRO) 500 mg tablet   3  Medication refill           Patient Instructions     Patient Instructions     Rest and elevate  Tylenol OTC in addition to your prescribed Celebrex  Monitor for signs of infection as discussed  Follow up wound check in 2-3 days  Start cipro  Puncture Wound   WHAT YOU NEED TO KNOW:   A puncture wound is a hole in the skin made by a sharp, pointed object  The area may be bruised or swollen  You may have bleeding, pain, or trouble moving the affected area  DISCHARGE INSTRUCTIONS:   Return to the emergency department if:   · You have severe pain  · You have numbness or tingling in the area of your wound  · Your wound starts bleeding and does not stop, even after you apply pressure  Call your doctor if:   · You have new drainage or a bad odor coming from the wound  · You have a fever or chills  · You have increased swelling, redness, or pain  · You have red streaks on your skin coming from your wound  · You have questions or concerns about your condition or care  Medicines: You may need any of the following:  · NSAIDs , such as ibuprofen, help decrease swelling, pain, and fever  This medicine is available with or without a doctor's order  NSAIDs can cause stomach bleeding or kidney problems in certain people  If you take blood thinner medicine, always ask your healthcare provider if NSAIDs are safe for you  Always read the medicine label and follow directions  · Antibiotics  help prevent a bacterial infection  · Take your medicine as directed  Contact your healthcare provider if you think your medicine is not helping or if you have side effects   Tell him of her if you are allergic to any medicine  Keep a list of the medicines, vitamins, and herbs you take  Include the amounts, and when and why you take them  Bring the list or the pill bottles to follow-up visits  Carry your medicine list with you in case of an emergency  Care for your wound as directed:  Keep your wound clean and dry  When you are allowed to bathe, carefully wash the wound with soap and water  Dry the area and put on new, clean bandages as directed  Change your bandages when they get wet or dirty  Rest and elevate  the injured area above the level of your heart as often as you can  This will help decrease swelling and pain  Prop your injured area on pillows or blankets to keep it elevated comfortably  Follow up with your doctor in 2 to 3 days:  Write down your questions so you remember to ask them during your visits  © Copyright [employer] 2022 Information is for End User's use only and may not be sold, redistributed or otherwise used for commercial purposes  All illustrations and images included in CareNotes® are the copyrighted property of A D A M , Inc  or [facility] [employer]   The above information is an  only  It is not intended as medical advice for individual conditions or treatments  Talk to your doctor, nurse or pharmacist before following any medical regimen to see if it is safe and effective for you  **Portions of the record may have been created with voice recognition software  Occasional wrong word or "sound a like" substitutions may have occurred due to the inherent limitations of voice recognition software  Read the chart carefully and recognize, using context, where substitutions have occurred  **     Chief Complaint     Chief Complaint   Patient presents with    Puncture Wound     Stepped on a nail thurday         History of Present Illness     52yo male presents to clinic with complaints of right foot pain since last night   He was wearing sneakers and stepped on a ike nail in his garden  He believes the nail went in 1/4 of and inch  He reports pain with ambulation  He soaked his foot last night and took his prescribed celebrex which helped  Review of Systems     Review of Systems   Constitutional: Negative for chills, fatigue and fever  Respiratory: Negative for shortness of breath  Cardiovascular: Negative for chest pain  Musculoskeletal: Positive for gait problem  Negative for arthralgias, joint swelling and myalgias  Skin: Positive for wound  Negative for color change, pallor and rash  Neurological: Negative for weakness and numbness           Current Medications     Current Outpatient Medications:     celecoxib (CeleBREX) 200 mg capsule, TAKE 1 CAPSULE TWICE A DAY BY MOUTH FOR PAIN/ANTI-INFLAMMATORY, Disp: , Rfl:     celecoxib (CeleBREX) 200 mg capsule, Take 1 capsule (200 mg total) by mouth 2 (two) times a day for 7 days, Disp: 14 capsule, Rfl: 0    ciprofloxacin (CIPRO) 500 mg tablet, Take 1 tablet (500 mg total) by mouth every 12 (twelve) hours for 7 days, Disp: 14 tablet, Rfl: 0    famotidine (PEPCID) 20 mg tablet, 1 TWICE A DAY FOR ANTI-ACID, Disp: , Rfl:     gabapentin (NEURONTIN) 300 mg capsule, TAKE 1 CAPSULE 3X A DAY FOR NERVE PAIN, Disp: , Rfl:     cyclobenzaprine (FLEXERIL) 10 mg tablet, Take 1 tablet (10 mg total) by mouth 3 (three) times a day as needed for muscle spasms (Patient not taking: Reported on 12/26/2018 ), Disp: 30 tablet, Rfl: 2    diazepam (VALIUM) 5 mg tablet, Take 1 tablet (5 mg total) by mouth every 8 (eight) hours as needed for anxiety (Patient not taking: Reported on 7/23/2022), Disp: 30 tablet, Rfl: 1    morphine (MSIR) 15 mg tablet, Take 1 tablet (15 mg total) by mouth every 4 (four) hours as needed for severe pain for up to 10 doses Max Daily Amount: 90 mg (Patient not taking: Reported on 2/23/2022 ), Disp: 10 tablet, Rfl: 0    neomycin-polymyxin-hydrocortisone (CORTISPORIN) 0 35%-10,000 units/mL-1% otic suspension, Administer 4 drops to the right ear every 6 (six) hours for 5 days, Disp: 10 mL, Rfl: 0    nystatin (MYCOSTATIN) cream, Apply topically 2 (two) times a day for 7 days, Disp: 30 g, Rfl: 1    nystatin-triamcinolone (MYCOLOG-II) cream, Apply topically 2 (two) times a day for 10 days, Disp: 120 g, Rfl: 1    oxyCODONE-acetaminophen (PERCOCET)  mg per tablet, Take 1 tablet by mouth every 4 (four) hours as needed for moderate pain (Patient not taking: Reported on 2/16/2022 ), Disp: , Rfl:     predniSONE 20 mg tablet, Please take 3 -20 mg tabs X3 days , Please take 2 - 20mg tabs X 3 days, Please take 1-20 mg tab X1 days (Patient not taking: Reported on 2/23/2022 ), Disp: 16 tablet, Rfl: 0    sildenafil (REVATIO) 20 mg tablet, Take 1 tablet (20 mg total) by mouth 3 (three) times a day (Patient not taking: Reported on 2/16/2022 ), Disp: 10 tablet, Rfl: 1    traMADol (Ultram) 50 mg tablet, Take 1 tablet (50 mg total) by mouth every 6 (six) hours as needed for moderate pain, Disp: 20 tablet, Rfl: 0    valACYclovir (VALTREX) 1,000 mg tablet, Take 1 tablet (1,000 mg total) by mouth 2 (two) times a day for 5 days, Disp: 10 tablet, Rfl: 0    Current Allergies     Allergies as of 07/23/2022    (No Known Allergies)            The following portions of the patient's history were reviewed and updated as appropriate: allergies, current medications, past family history, past medical history, past social history, past surgical history and problem list      Past Medical History:   Diagnosis Date    Back pain        Past Surgical History:   Procedure Laterality Date    BACK SURGERY  09/2017    HERNIA REPAIR      INGUINAL    MASS EXCISION N/A 10/25/2017    Procedure: RIGHT BACK MASS EXCISION;  Surgeon: Fifi Lindsay MD;  Location: [facility];  Service: General    NECK SURGERY         History reviewed  No pertinent family history  Medications have been verified          Objective     Pulse 99   Temp 98 °F (36 7 °C)   Resp 18   SpO2 98%        Physical Exam     Physical Exam  Vitals and nursing note reviewed  Constitutional:       General: He is not in acute distress  Appearance: Normal appearance  HENT:      Head: Normocephalic and atraumatic  Cardiovascular:      Rate and Rhythm: Normal rate  Pulmonary:      Effort: Pulmonary effort is normal    Skin:     General: Skin is warm and dry  Capillary Refill: Capillary refill takes less than 2 seconds  Findings: No bruising or erythema  Comments: 1mm closed puncture of mid plantar right foot  +TTP  No abscess, drainage, erythema or FB  Neurological:      Mental Status: He is alert and oriented to person, place, and time  Sensory: No sensory deficit  Gait: Gait abnormal (antalgic)  Procedure:    Soaked foot in betadine for 15 min, cleansed with 200ml normal saline with syringe  No FB noted  Pt tolerated procedure well

## 2022-07-23 NOTE — PATIENT INSTRUCTIONS
Rest and elevate  Tylenol OTC in addition to your prescribed Celebrex  Monitor for signs of infection as discussed  Follow up wound check in 2-3 days  Start cipro  Puncture Wound   WHAT YOU NEED TO KNOW:   A puncture wound is a hole in the skin made by a sharp, pointed object  The area may be bruised or swollen  You may have bleeding, pain, or trouble moving the affected area  DISCHARGE INSTRUCTIONS:   Return to the emergency department if:   You have severe pain  You have numbness or tingling in the area of your wound  Your wound starts bleeding and does not stop, even after you apply pressure  Call your doctor if:   You have new drainage or a bad odor coming from the wound  You have a fever or chills  You have increased swelling, redness, or pain  You have red streaks on your skin coming from your wound  You have questions or concerns about your condition or care  Medicines: You may need any of the following:  NSAIDs , such as ibuprofen, help decrease swelling, pain, and fever  This medicine is available with or without a doctor's order  NSAIDs can cause stomach bleeding or kidney problems in certain people  If you take blood thinner medicine, always ask your healthcare provider if NSAIDs are safe for you  Always read the medicine label and follow directions  Antibiotics  help prevent a bacterial infection  Take your medicine as directed  Contact your healthcare provider if you think your medicine is not helping or if you have side effects  Tell him of her if you are allergic to any medicine  Keep a list of the medicines, vitamins, and herbs you take  Include the amounts, and when and why you take them  Bring the list or the pill bottles to follow-up visits  Carry your medicine list with you in case of an emergency  Care for your wound as directed:  Keep your wound clean and dry  When you are allowed to bathe, carefully wash the wound with soap and water   Dry the area and put on new, clean bandages as directed  Change your bandages when they get wet or dirty  Rest and elevate  the injured area above the level of your heart as often as you can  This will help decrease swelling and pain  Prop your injured area on pillows or blankets to keep it elevated comfortably  Follow up with your doctor in 2 to 3 days:  Write down your questions so you remember to ask them during your visits  © Copyright [employer] 2022 Information is for End User's use only and may not be sold, redistributed or otherwise used for commercial purposes  All illustrations and images included in CareNotes® are the copyrighted property of A [employer] A M , Inc  or [facility] [employer]   The above information is an  only  It is not intended as medical advice for individual conditions or treatments  Talk to your doctor, nurse or pharmacist before following any medical regimen to see if it is safe and effective for you

## 2022-12-05 DIAGNOSIS — R21 RASH: ICD-10-CM

## 2023-02-05 ENCOUNTER — OFFICE VISIT (OUTPATIENT)
Dept: URGENT CARE | Facility: CLINIC | Age: 58
End: 2023-02-05

## 2023-02-05 VITALS
SYSTOLIC BLOOD PRESSURE: 126 MMHG | HEIGHT: 65 IN | BODY MASS INDEX: 25.49 KG/M2 | WEIGHT: 153 LBS | DIASTOLIC BLOOD PRESSURE: 75 MMHG | HEART RATE: 93 BPM | OXYGEN SATURATION: 97 % | TEMPERATURE: 98.4 F

## 2023-02-05 DIAGNOSIS — Q80.9 XERODERMA: Primary | ICD-10-CM

## 2023-02-05 RX ORDER — PETROLATUM,WHITE 41 %
1 OINTMENT (GRAM) TOPICAL 2 TIMES DAILY
Qty: 454 G | Refills: 0 | Status: SHIPPED | OUTPATIENT
Start: 2023-02-05 | End: 2023-02-15

## 2023-02-05 NOTE — PROGRESS NOTES
[facility]        NAME: Ari Nettles is a 56 y o  male  : 1965    MRN: [id number]  DATE: 2023  TIME: 1:41 PM    Assessment and Orders   Xeroderma [Q80 9]  1  Xeroderma  Emollient (Eucerin Advanced Repair) CREA            Plan and Discussion      Symptoms and exam consistent with xeroderma secondary to exposure from tiling glue  Will treat with Eucerin emollient BID  Discussed ED precautions including (but not limited to)  • Difficultly breathing or shortness of breath  • Chest pain  • Acutely worsening symptoms  Risks and benefits discussed  Patient understands and agrees with the plan  Follow up with PCP  Chief Complaint     Chief Complaint   Patient presents with   • Hand Pain     Hurt to the touch, seem a bit swollen  Patient describes the pain as tender  Patient noticed it about 3 days ago  But dismissed it assuming the pain could be coming from the work he was doing earlier in the week  History of Present Illness       HPI    Patient is a 55 yo M who presents with dryness and cracking in the skin on his palms  He states that last week 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 and notes that the glue he uses for tiling is composed of harsh chemicals  Review of Systems   Review of Systems   Constitutional: Negative for fever  Respiratory: Negative for shortness of breath  Cardiovascular: Negative for chest pain  Skin: Positive for rash  Neurological: Negative for headaches           Current Medications       Current Outpatient Medications:   •  Emollient (Eucerin Advanced Repair) CREA, Apply 1 application topically 2 (two) times a day for 10 days, Disp: 454 g, Rfl: 0  •  celecoxib (CeleBREX) 200 mg capsule, TAKE 1 CAPSULE TWICE A DAY BY MOUTH FOR PAIN/ANTI-INFLAMMATORY, Disp: , Rfl:   •  celecoxib (CeleBREX) 200 mg capsule, Take 1 capsule (200 mg total) by mouth 2 (two) times a day for 7 days, Disp: 14 capsule, Rfl: 0  •  cyclobenzaprine (FLEXERIL) 10 mg tablet, Take 1 tablet (10 mg total) by mouth 3 (three) times a day as needed for muscle spasms (Patient not taking: Reported on 12/26/2018 ), Disp: 30 tablet, Rfl: 2  •  diazepam (VALIUM) 5 mg tablet, Take 1 tablet (5 mg total) by mouth every 8 (eight) hours as needed for anxiety (Patient not taking: Reported on 7/23/2022), Disp: 30 tablet, Rfl: 1  •  famotidine (PEPCID) 20 mg tablet, 1 TWICE A DAY FOR ANTI-ACID, Disp: , Rfl:   •  gabapentin (NEURONTIN) 300 mg capsule, TAKE 1 CAPSULE 3X A DAY FOR NERVE PAIN, Disp: , Rfl:   •  morphine (MSIR) 15 mg tablet, Take 1 tablet (15 mg total) by mouth every 4 (four) hours as needed for severe pain for up to 10 doses Max Daily Amount: 90 mg (Patient not taking: Reported on 2/23/2022 ), Disp: 10 tablet, Rfl: 0  •  neomycin-polymyxin-hydrocortisone (CORTISPORIN) 0 35%-10,000 units/mL-1% otic suspension, Administer 4 drops to the right ear every 6 (six) hours for 5 days, Disp: 10 mL, Rfl: 0  •  nystatin (MYCOSTATIN) cream, Apply topically 2 (two) times a day for 7 days, Disp: 30 g, Rfl: 1  •  nystatin-triamcinolone (MYCOLOG-II) cream, Apply topically 2 (two) times a day for 10 days, Disp: 120 g, Rfl: 1  •  oxyCODONE-acetaminophen (PERCOCET)  mg per tablet, Take 1 tablet by mouth every 4 (four) hours as needed for moderate pain (Patient not taking: Reported on 2/16/2022 ), Disp: , Rfl:   •  predniSONE 20 mg tablet, Please take 3 -20 mg tabs X3 days , Please take 2 - 20mg tabs X 3 days, Please take 1-20 mg tab X1 days (Patient not taking: Reported on 2/23/2022 ), Disp: 16 tablet, Rfl: 0  •  sildenafil (REVATIO) 20 mg tablet, Take 1 tablet (20 mg total) by mouth 3 (three) times a day (Patient not taking: Reported on 2/16/2022 ), Disp: 10 tablet, Rfl: 1  •  traMADol (Ultram) 50 mg tablet, Take 1 tablet (50 mg total) by mouth every 6 (six) hours as needed for moderate pain, Disp: 20 tablet, Rfl: 0  •  valACYclovir (VALTREX) 1,000 mg tablet, Take 1 tablet (1,000 mg total) by mouth 2 (two) times a day for 5 days, Disp: 10 tablet, Rfl: 0    Current Allergies     Allergies as of 02/05/2023   • (No Known Allergies)            The following portions of the patient's history were reviewed and updated as appropriate: allergies, current medications, past family history, past medical history, past social history, past surgical history and problem list      Past Medical History:   Diagnosis Date   • Back pain        Past Surgical History:   Procedure Laterality Date   • BACK SURGERY  09/2017   • HERNIA REPAIR      INGUINAL   • MASS EXCISION N/A 10/25/2017    Procedure: RIGHT BACK MASS EXCISION;  Surgeon: Zamzam Ornelas MD;  Location: [facility];  Service: General   • NECK SURGERY         No family history on file  Medications have been verified  Objective   /75   Pulse 93   Temp 98 4 °F (36 9 °C)   Ht 5' 5" (1 651 m)   Wt 69 4 kg (153 lb)   SpO2 97%   BMI 25 46 kg/m²   No LMP for male patient  Physical Exam     Physical Exam  Constitutional:       General: He is not in acute distress  Pulmonary:      Effort: No respiratory distress  Skin:     Findings: Rash present  Comments: Skin on palms is very dry and cracked  There is a small superficial laceration on the right ring finger that is non bleeding and non-erythematous    Neurological:      General: No focal deficit present  Mental Status: He is alert and oriented to person, place, and time     Psychiatric:         Mood and Affect: Mood normal          Behavior: Behavior normal                Domenick Hammans Viotto DO

## 2023-02-23 ENCOUNTER — RA CDI HCC (OUTPATIENT)
Dept: OTHER | Facility: HOSPITAL | Age: 58
End: 2023-02-23

## 2023-02-28 ENCOUNTER — HOSPITAL ENCOUNTER (EMERGENCY)
Facility: HOSPITAL | Age: 58
End: 2023-03-01
Attending: EMERGENCY MEDICINE

## 2023-02-28 DIAGNOSIS — Z00.8 ENCOUNTER FOR PSYCHOLOGICAL EVALUATION: Primary | ICD-10-CM

## 2023-02-28 LAB
ALBUMIN SERPL BCP-MCNC: 4.7 G/DL (ref 3.5–5)
ALP SERPL-CCNC: 57 U/L (ref 34–104)
ALT SERPL W P-5'-P-CCNC: 15 U/L (ref 7–52)
AMPHETAMINES SERPL QL SCN: POSITIVE
ANION GAP SERPL CALCULATED.3IONS-SCNC: 8 MMOL/L (ref 4–13)
APAP SERPL-MCNC: <10 UG/ML (ref 10–20)
APTT PPP: 26 SECONDS (ref 23–37)
AST SERPL W P-5'-P-CCNC: 14 U/L (ref 13–39)
BARBITURATES UR QL: NEGATIVE
BASOPHILS # BLD AUTO: 0.06 THOUSANDS/ÂΜL (ref 0–0.1)
BASOPHILS NFR BLD AUTO: 0 % (ref 0–1)
BENZODIAZ UR QL: NEGATIVE
BILIRUB SERPL-MCNC: 0.98 MG/DL (ref 0.2–1)
BILIRUB UR QL STRIP: NEGATIVE
BUN SERPL-MCNC: 14 MG/DL (ref 5–25)
CALCIUM SERPL-MCNC: 9.9 MG/DL (ref 8.4–10.2)
CHLORIDE SERPL-SCNC: 101 MMOL/L (ref 96–108)
CLARITY UR: ABNORMAL
CO2 SERPL-SCNC: 28 MMOL/L (ref 21–32)
COCAINE UR QL: POSITIVE
COLOR UR: ABNORMAL
CREAT SERPL-MCNC: 0.83 MG/DL (ref 0.6–1.3)
EOSINOPHIL # BLD AUTO: 0.01 THOUSAND/ÂΜL (ref 0–0.61)
EOSINOPHIL NFR BLD AUTO: 0 % (ref 0–6)
ERYTHROCYTE [DISTWIDTH] IN BLOOD BY AUTOMATED COUNT: 12.7 % (ref 11.6–15.1)
ETHANOL SERPL-MCNC: <10 MG/DL
FLUAV RNA RESP QL NAA+PROBE: NEGATIVE
FLUBV RNA RESP QL NAA+PROBE: NEGATIVE
GFR SERPL CREATININE-BSD FRML MDRD: 99 ML/MIN/1.73SQ M
GLUCOSE SERPL-MCNC: 133 MG/DL (ref 65–140)
GLUCOSE UR STRIP-MCNC: NEGATIVE MG/DL
HCT VFR BLD AUTO: 42.9 % (ref 36.5–49.3)
HGB BLD-MCNC: 14.8 G/DL (ref 12–17)
HGB UR QL STRIP.AUTO: NEGATIVE
IMM GRANULOCYTES # BLD AUTO: 0.04 THOUSAND/UL (ref 0–0.2)
IMM GRANULOCYTES NFR BLD AUTO: 0 % (ref 0–2)
INR PPP: 0.93 (ref 0.84–1.19)
KETONES UR STRIP-MCNC: NEGATIVE MG/DL
LEUKOCYTE ESTERASE UR QL STRIP: NEGATIVE
LYMPHOCYTES # BLD AUTO: 1.49 THOUSANDS/ÂΜL (ref 0.6–4.47)
LYMPHOCYTES NFR BLD AUTO: 11 % (ref 14–44)
MAGNESIUM SERPL-MCNC: 1.9 MG/DL (ref 1.9–2.7)
MCH RBC QN AUTO: 30.4 PG (ref 26.8–34.3)
MCHC RBC AUTO-ENTMCNC: 34.5 G/DL (ref 31.4–37.4)
MCV RBC AUTO: 88 FL (ref 82–98)
METHADONE UR QL: NEGATIVE
MONOCYTES # BLD AUTO: 0.66 THOUSAND/ÂΜL (ref 0.17–1.22)
MONOCYTES NFR BLD AUTO: 5 % (ref 4–12)
NEUTROPHILS # BLD AUTO: 11.19 THOUSANDS/ÂΜL (ref 1.85–7.62)
NEUTS SEG NFR BLD AUTO: 84 % (ref 43–75)
NITRITE UR QL STRIP: NEGATIVE
NRBC BLD AUTO-RTO: 0 /100 WBCS
OPIATES UR QL SCN: NEGATIVE
OXYCODONE+OXYMORPHONE UR QL SCN: NEGATIVE
PCP UR QL: NEGATIVE
PH UR STRIP.AUTO: 6 [PH]
PLATELET # BLD AUTO: 357 THOUSANDS/UL (ref 149–390)
PLATELET # BLD AUTO: 363 THOUSANDS/UL (ref 149–390)
PMV BLD AUTO: 8.7 FL (ref 8.9–12.7)
PMV BLD AUTO: 8.8 FL (ref 8.9–12.7)
POTASSIUM SERPL-SCNC: 4.5 MMOL/L (ref 3.5–5.3)
PROT SERPL-MCNC: 7.3 G/DL (ref 6.4–8.4)
PROT UR STRIP-MCNC: NEGATIVE MG/DL
PROTHROMBIN TIME: 12.5 SECONDS (ref 11.6–14.5)
RBC # BLD AUTO: 4.87 MILLION/UL (ref 3.88–5.62)
RSV RNA RESP QL NAA+PROBE: NEGATIVE
SALICYLATES SERPL-MCNC: <5 MG/DL (ref 3–20)
SARS-COV-2 RNA RESP QL NAA+PROBE: NEGATIVE
SODIUM SERPL-SCNC: 137 MMOL/L (ref 135–147)
SP GR UR STRIP.AUTO: 1.01
THC UR QL: NEGATIVE
TSH SERPL DL<=0.05 MIU/L-ACNC: 1.19 UIU/ML (ref 0.45–4.5)
UROBILINOGEN UR QL STRIP.AUTO: 0.2 E.U./DL
WBC # BLD AUTO: 13.45 THOUSAND/UL (ref 4.31–10.16)

## 2023-02-28 RX ORDER — HYDROXYZINE HYDROCHLORIDE 25 MG/1
25 TABLET, FILM COATED ORAL
Status: CANCELLED | OUTPATIENT
Start: 2023-02-28

## 2023-02-28 RX ORDER — MAGNESIUM HYDROXIDE/ALUMINUM HYDROXICE/SIMETHICONE 120; 1200; 1200 MG/30ML; MG/30ML; MG/30ML
30 SUSPENSION ORAL EVERY 4 HOURS PRN
Status: CANCELLED | OUTPATIENT
Start: 2023-02-28

## 2023-02-28 RX ORDER — LANOLIN ALCOHOL/MO/W.PET/CERES
3 CREAM (GRAM) TOPICAL
Status: CANCELLED | OUTPATIENT
Start: 2023-02-28

## 2023-02-28 RX ORDER — HALOPERIDOL 5 MG/ML
5 INJECTION INTRAMUSCULAR
Status: CANCELLED | OUTPATIENT
Start: 2023-02-28

## 2023-02-28 RX ORDER — LORAZEPAM 2 MG/ML
1 INJECTION INTRAMUSCULAR
Status: CANCELLED | OUTPATIENT
Start: 2023-02-28

## 2023-02-28 RX ORDER — RISPERIDONE 0.25 MG/1
0.5 TABLET ORAL
Status: CANCELLED | OUTPATIENT
Start: 2023-02-28

## 2023-02-28 RX ORDER — RISPERIDONE 1 MG/1
1 TABLET ORAL
Status: CANCELLED | OUTPATIENT
Start: 2023-02-28

## 2023-02-28 RX ORDER — RISPERIDONE 0.25 MG/1
0.25 TABLET ORAL
Status: CANCELLED | OUTPATIENT
Start: 2023-02-28

## 2023-02-28 RX ORDER — LORAZEPAM 1 MG/1
1 TABLET ORAL ONCE
Status: COMPLETED | OUTPATIENT
Start: 2023-02-28 | End: 2023-02-28

## 2023-02-28 RX ADMIN — LORAZEPAM 1 MG: 1 TABLET ORAL at 15:38
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
## 2023-02-28 NOTE — ED NOTES
Pt resting in bed waiting for psych consult  Pt provided iPad #4 to complete consult       Kati Piña  02/28/23 3599

## 2023-02-28 NOTE — ED PROVIDER NOTES
Patient seen and evaluated by prior attending, see his note for specifics, clearance labs ordered, resulted and interpreted   + RUDS for cocaine / Amphetamines, leukocytosis of little clinical significance  PATIENT is medically stable for admission to psychiatric service  Portions of the record may have been created with voice recognition software  Occasional wrong word or "sound a like" substitutions may have occurred due to the inherent limitations of voice recognition software  Read the chart carefully and recognize, using context, where substitutions have occurred       Paul Dockery III, DO  02/28/23 Kilo Chu
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
## 2023-02-28 NOTE — ED NOTES
Crisis met with [facility] crisis  302 has been upheld  Patient aware of this and aware of his rights

## 2023-03-01 VITALS
RESPIRATION RATE: 18 BRPM | WEIGHT: 153 LBS | HEIGHT: 65 IN | OXYGEN SATURATION: 98 % | BODY MASS INDEX: 25.49 KG/M2 | DIASTOLIC BLOOD PRESSURE: 88 MMHG | TEMPERATURE: 97.7 F | SYSTOLIC BLOOD PRESSURE: 137 MMHG | HEART RATE: 110 BPM

## 2023-03-01 LAB
ATRIAL RATE: 117 BPM
P AXIS: 72 DEGREES
PR INTERVAL: 152 MS
QRS AXIS: 42 DEGREES
QRSD INTERVAL: 92 MS
QT INTERVAL: 312 MS
QTC INTERVAL: 435 MS
T WAVE AXIS: 57 DEGREES
VENTRICULAR RATE: 117 BPM

## 2023-03-01 NOTE — ED NOTES
Spoke with Dr Barbie Meckel, patient is appropriate for discharge home and outpatient follow up  Arelis at intake aware, inpatient bed cancelled

## 2023-03-01 NOTE — ED NOTES
Transport for 1055 was cancelled  Will need to be logged with round trip when the bed is available  Awaiting a time from intake

## 2023-03-01 NOTE — ED NOTES
Nanda Suicide Risk Assessment deferred, as unable to assess while patient sleeping  Behavioral Health Assessment deferred as patient is sleeping and would benefit from additional rest   Vital signs deferred until patient awake, no signs or symptoms of respiratory distress at this time      Once patient is awake and able to participate, will complete assessments   ]     Pat Colorado RN  03/01/23 3293

## 2023-03-01 NOTE — ED NOTES
Nanda Suicide Risk Assessment deferred, as unable to assess while patient sleeping  Behavioral Health Assessment deferred as patient is sleeping and would benefit from additional rest   Vital signs deferred until patient awake, no signs or symptoms of respiratory distress at this time  Once patient is awake and able to participate, will complete assessments         Peter Monroy RN  03/01/23 5482

## 2023-03-01 NOTE — ED NOTES
Nanda Suicide Risk Assessment deferred, as unable to assess while patient sleeping  Behavioral Health Assessment deferred as patient is sleeping and would benefit from additional rest   Vital signs deferred until patient awake, no signs or symptoms of respiratory distress at this time  Once patient is awake and able to participate, will complete assessments        Fara Breen RN  02/28/23 1376

## 2023-03-01 NOTE — ED NOTES
Crisis attempted to speak with pt to discuss 201  Pt sleeping  Will review with pt at a later time or during 1st shift on 3/1/23

## 2023-03-01 NOTE — ED NOTES
Bed search:    [facility]Per [facility]-no beds   [facility]-no beds  [facility]-jh beds  [id number]-J beds  [facility]-Per A[facility]-no beds  In [facility]-Per Sofi-clinicals will be reviewed for discharge bed in am

## 2023-03-01 NOTE — ED CARE HANDOFF
Emergency Department Sign Out Note        Sign out and transfer of care from Dr Loyda Reid  See Separate Emergency Department note  The patient, Melva Valera, was evaluated by the previous provider for psych evaluation   Workup Completed:  CBC, CMP, EKG, UA, UDS, X ray  Psychiatry consult  Crisis Consult     ED Course / Workup Pending (followup): Patient was initially seen as a 36 petition by his sister for violent threats  Patient adamantly denied aching threatening comments or threatening to hurt himself  Patient was initially examined by psychiatry who recommended uphold the 302  Was reevaluated by psychiatry today  Agree that the patient is safe for discharge home  The patient has been calm and cooperative since being in the emergency department  Continues to deny any suicidal or homicidal ideation  I agree and do not see any grounds for 302 to be upheld at this time  ED Course as of 03/01/23 1433   Tue Feb 28, 2023   1648 Per Dr Conrado Adams, I have completed the psychiatry consult on Melva Valera  Inpatient psychiatric treatment under the upheld 302 is indicated for provision of precautions, further diagnostic evaluation and treatment stabilization  Recommend virtual observation suicide precautions  As the patient is a vague historian, recommend monitoring for alcohol withdrawal via CIWA protocol while supplementing thiamine and folate  Reconsult psychiatry as needed  Let me know if any questions  Thanks  4:49 PM  20 days left   1703 Spoke with the patient about psychiatry's recommendations  Will uphold 302 at this time, can be converted to [address] Mar 01, 2023   0857 Spoke with Crisis about the patient  Patient was accepted to [facility] older adult  Patient was never offered a voluntary 201  Will offer to patient at this time    Will obtain stat psychiatry consult     Procedures  MDM        Disposition  Final diagnoses:   Encounter for psychological evaluation     Time reflects when diagnosis was documented in both MDM as applicable and the Disposition within this note     Time User Action Codes Description Comment    2/28/2023 10:54 AM Radames Salinas Add [Z00 8] Encounter for psychological evaluation       ED Disposition     ED Disposition   Transfer to [address] [place] [address]   --    Date/Time   Wed Mar 1, 2023 12:00 AM    Comment   Jessica Teixeira should be transferred out to d and has been medically cleared  Follow-up Information    None       Patient's Medications   Discharge Prescriptions    No medications on file     No discharge procedures on file         ED Provider  Electronically Signed by     Mariela Tomas DO  03/01/23 8573

## 2023-03-01 NOTE — ED NOTES
Nanda Suicide Risk Assessment deferred, as unable to assess while patient sleeping  Behavioral Health Assessment deferred as patient is sleeping and would benefit from additional rest   Vital signs deferred until patient awake, no signs or symptoms of respiratory distress at this time  Once patient is awake and able to participate, will complete assessments        Clare Aparicio RN  02/28/23 2578

## 2023-03-01 NOTE — ED NOTES
Patient is accepted at [facility]  Patient is accepted by Dr Darling Sanchez per Tomas Resendez  Transportation is arranged with [employer]  Transportation is scheduled for anytime after 2 pm on 3/1/23  Patient may go to the floor after 2pm           Nurse report is to be called to [phone] prior to patient transfer
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
## 2023-03-01 NOTE — ED NOTES
Nanda Suicide Risk Assessment deferred, as unable to assess while patient sleeping  Behavioral Health Assessment deferred as patient is sleeping and would benefit from additional rest   Vital signs deferred until patient awake, no signs or symptoms of respiratory distress at this time  Once patient is awake and able to participate, will complete assessments         Jhony Damon RN  03/01/23 8879

## 2023-03-01 NOTE — ED NOTES
Nanda Suicide Risk Assessment deferred, as unable to assess while patient sleeping  Behavioral Health Assessment deferred as patient is sleeping and would benefit from additional rest   Vital signs deferred until patient awake, no signs or symptoms of respiratory distress at this time  Once patient is awake and able to participate, will complete assessments         Omar MARANDA Beyer  03/01/23 2803
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
## 2023-03-02 ENCOUNTER — OFFICE VISIT (OUTPATIENT)
Dept: FAMILY MEDICINE CLINIC | Facility: CLINIC | Age: 58
End: 2023-03-02

## 2023-03-02 ENCOUNTER — TRANSITIONAL CARE MANAGEMENT (OUTPATIENT)
Dept: FAMILY MEDICINE CLINIC | Facility: CLINIC | Age: 58
End: 2023-03-02

## 2023-03-02 VITALS
BODY MASS INDEX: 23.66 KG/M2 | SYSTOLIC BLOOD PRESSURE: 108 MMHG | HEIGHT: 65 IN | WEIGHT: 142 LBS | OXYGEN SATURATION: 98 % | DIASTOLIC BLOOD PRESSURE: 70 MMHG | TEMPERATURE: 99.4 F | HEART RATE: 113 BPM

## 2023-03-02 DIAGNOSIS — F32.A DEPRESSION, UNSPECIFIED DEPRESSION TYPE: Primary | ICD-10-CM

## 2023-03-02 DIAGNOSIS — Z11.4 SCREENING FOR HIV (HUMAN IMMUNODEFICIENCY VIRUS): ICD-10-CM

## 2023-03-02 DIAGNOSIS — Z23 ENCOUNTER FOR IMMUNIZATION: ICD-10-CM

## 2023-03-02 DIAGNOSIS — Z11.59 NEED FOR HEPATITIS C SCREENING TEST: ICD-10-CM

## 2023-03-02 DIAGNOSIS — R21 RASH: ICD-10-CM

## 2023-03-02 DIAGNOSIS — F10.10 ETOH ABUSE: ICD-10-CM

## 2023-03-02 DIAGNOSIS — R11.0 NAUSEA: ICD-10-CM

## 2023-03-02 RX ORDER — ONDANSETRON HYDROCHLORIDE 8 MG/1
8 TABLET, FILM COATED ORAL EVERY 8 HOURS PRN
Qty: 30 TABLET | Refills: 1 | Status: SHIPPED | OUTPATIENT
Start: 2023-03-02

## 2023-03-02 NOTE — PROGRESS NOTES
Assessment/Plan:    No problem-specific Assessment & Plan notes found for this encounter  Diagnoses and all orders for this visit:    Depression, unspecified depression type  Comments:  pt denies depression    ETOH abuse    Need for hepatitis C screening test  -     Hepatitis C Antibody ([facility]); Future    Encounter for immunization  -     Pneumococcal Conjugate Vaccine 20-valent (PCV20)    Screening for HIV (human immunodeficiency virus)  -     HIV 1/2 AG/AB w Reflex [facility] for 2 yr old and above; Future          PHQ-2/9 Depression Screening    Little interest or pleasure in doing things: 0 - not at all  Feeling down, depressed, or hopeless: 0 - not at all  PHQ-2 Score: 0  PHQ-2 Interpretation: Negative depression screen        TCM Call     Date and time call was made  3/2/2023  [address] [place] [address] reviewed  Records reviewed    Patient was hospitialized at  [address]    Date of Admission  02/28/23    Date of discharge  03/01/23    Diagnosis  Encounter for psychological evaluation    Disposition  Home      TCM Call     Scheduled for follow up? Yes    I have advised the patient to call PCP with any new or worsening symptoms  robert sandoval          Subjective:      Patient ID: Morris Felix is a 56 y o  male  Hospital follow up for psychiatric evaluation involving EtoH abuse, and drug abuse with cocaine and methamphetamine, suicidal ideation resulting in a 302, depression screening today was negative, pt has not used any drugs since discharge, pt want to go into in pt rehab, pt states that he feels nausous today, otherwise he feels fine      The following portions of the patient's history were reviewed and updated as appropriate: allergies, current medications, past family history, past medical history, past social history, past surgical history and problem list     Review of Systems   Constitutional: Negative for chills, fatigue and fever     Respiratory: Negative for shortness of breath and wheezing  Cardiovascular: Negative for chest pain and palpitations  Gastrointestinal: Positive for diarrhea and nausea  Negative for abdominal pain and vomiting  Psychiatric/Behavioral: Negative for hallucinations, self-injury, sleep disturbance and suicidal ideas  Objective:    /70 (BP Location: Left arm, Patient Position: Sitting)   Pulse (!) 113   Temp 99 4 °F (37 4 °C) (Tympanic)   Ht 5' 5" (1 651 m)   Wt 64 4 kg (142 lb)   SpO2 98%   BMI 23 63 kg/m²      Physical Exam  Vitals and nursing note reviewed  Constitutional:       General: He is not in acute distress  Appearance: Normal appearance  He is not ill-appearing or diaphoretic  HENT:      Head: Normocephalic and atraumatic  Eyes:      Conjunctiva/sclera: Conjunctivae normal    Cardiovascular:      Rate and Rhythm: Normal rate and regular rhythm  Heart sounds: Normal heart sounds  No murmur heard  Pulmonary:      Effort: Pulmonary effort is normal  No respiratory distress  Breath sounds: Normal breath sounds  No wheezing, rhonchi or rales  Abdominal:      General: There is no distension  Palpations: Abdomen is soft  There is no mass  Tenderness: There is no abdominal tenderness  There is no guarding or rebound  Musculoskeletal:      Cervical back: Normal range of motion and neck supple  No rigidity  Right lower leg: No edema  Left lower leg: No edema  Lymphadenopathy:      Cervical: No cervical adenopathy  Neurological:      Mental Status: He is alert and oriented to person, place, and time  Psychiatric:         Mood and Affect: Mood normal          Behavior: Behavior normal          Thought Content:  Thought content normal          Judgment: Judgment normal

## 2023-04-02 ENCOUNTER — OFFICE VISIT (OUTPATIENT)
Dept: URGENT CARE | Facility: CLINIC | Age: 58
End: 2023-04-02

## 2023-04-02 VITALS
OXYGEN SATURATION: 97 % | DIASTOLIC BLOOD PRESSURE: 88 MMHG | RESPIRATION RATE: 18 BRPM | TEMPERATURE: 98.6 F | HEART RATE: 92 BPM | SYSTOLIC BLOOD PRESSURE: 130 MMHG

## 2023-04-02 DIAGNOSIS — H65.192 OTHER NON-RECURRENT ACUTE NONSUPPURATIVE OTITIS MEDIA OF LEFT EAR: ICD-10-CM

## 2023-04-02 DIAGNOSIS — S05.01XA ABRASION OF RIGHT CORNEA, INITIAL ENCOUNTER: Primary | ICD-10-CM

## 2023-04-02 DIAGNOSIS — J30.9 ALLERGIC RHINITIS, UNSPECIFIED SEASONALITY, UNSPECIFIED TRIGGER: ICD-10-CM

## 2023-04-02 DIAGNOSIS — B00.1 COLD SORE: ICD-10-CM

## 2023-04-02 RX ORDER — AMOXICILLIN 875 MG/1
875 TABLET, COATED ORAL 2 TIMES DAILY
Qty: 14 TABLET | Refills: 0 | Status: SHIPPED | OUTPATIENT
Start: 2023-04-02 | End: 2023-04-09

## 2023-04-02 RX ORDER — OFLOXACIN 3 MG/ML
1 SOLUTION/ DROPS OPHTHALMIC 4 TIMES DAILY
Qty: 5 ML | Refills: 0 | Status: SHIPPED | OUTPATIENT
Start: 2023-04-02 | End: 2023-04-07

## 2023-04-02 RX ORDER — VALACYCLOVIR HYDROCHLORIDE 1 G/1
1000 TABLET, FILM COATED ORAL 2 TIMES DAILY
Qty: 10 TABLET | Refills: 0 | Status: SHIPPED | OUTPATIENT
Start: 2023-04-02 | End: 2023-04-07

## 2023-04-02 NOTE — PATIENT INSTRUCTIONS
Follow up with ENT  Antibiotics as directed for ear infection  Eye drops as directed for corneal abrasion  Cold sore medication as directed  Over the counter Zyrtec daily  Flonase nasal spray daily  Follow up with eye MD if no improvement in eye pain/symptoms in a few days  Follow up with PCP in 3-5 days  Proceed to the ER with worsening symptoms

## 2023-04-02 NOTE — PROGRESS NOTES
[facility]        NAME: Raleigh Bernabe is a 56 y o  male  : 1965    MRN: [id number]  DATE: 2023  TIME: 9:28 AM    Assessment and Plan   Abrasion of right cornea, initial encounter [S05 01XA]  1  Abrasion of right cornea, initial encounter  ofloxacin (OCUFLOX) 0 3 % ophthalmic solution      2  Other non-recurrent acute nonsuppurative otitis media of left ear  amoxicillin (AMOXIL) 875 mg tablet    Ambulatory Referral to Otolaryngology      3  Cold sore  valACYclovir (VALTREX) 1,000 mg tablet      4  Allergic rhinitis, unspecified seasonality, unspecified trigger          After patient consent was obtained, 1 drop of tetracaine and fluorescein stain was administered into R eye  Direct visualization was achieved with UV light with small abrasion noted to middle of cornea just below iris  R eyelids were everted without evidence of foreign body  R eye was irrigated with saline solution  Patient tolerated procedure without incident  Patient Instructions     Follow up with ENT  Antibiotics as directed for ear infection  Eye drops as directed for corneal abrasion  Cold sore medication as directed  Over the counter Zyrtec daily  Flonase nasal spray daily  Follow up with eye MD if no improvement in eye pain/symptoms in a few days  Follow up with PCP in 3-5 days  Proceed to the ER with worsening symptoms  Chief Complaint     Chief Complaint   Patient presents with   • Conjunctivitis     Pt c/o pink eye and ear pain         History of Present Illness       The patient presents today with complaints of R eye irritation, wateriness, pain, photophobia, nasal congestion, BL ear pain, and upper lip cold sore that started yesterday  He states he was doing work outside in his shed and is unsure if something may have gotten in his eye  He has not taken anything OTC for his symptoms  Review of Systems   Review of Systems   Constitutional: Negative for chills, fatigue and fever  HENT: Positive for congestion, ear pain (bilateral), postnasal drip and rhinorrhea  Negative for sinus pressure, sinus pain and sore throat  Eyes: Positive for photophobia, pain, discharge and redness  R eye   Respiratory: Positive for cough  Negative for chest tightness, shortness of breath and wheezing  Cardiovascular: Negative for chest pain and palpitations  Gastrointestinal: Negative for abdominal pain, diarrhea, nausea and vomiting  Genitourinary: Negative for difficulty urinating  Musculoskeletal: Negative for myalgias  Skin: Negative for rash  Allergic/Immunologic: Positive for environmental allergies  Neurological: Negative for dizziness and headaches  Psychiatric/Behavioral: Negative            Current Medications       Current Outpatient Medications:   •  amoxicillin (AMOXIL) 875 mg tablet, Take 1 tablet (875 mg total) by mouth 2 (two) times a day for 7 days, Disp: 14 tablet, Rfl: 0  •  ofloxacin (OCUFLOX) 0 3 % ophthalmic solution, Administer 1 drop to the right eye 4 (four) times a day for 5 days, Disp: 5 mL, Rfl: 0  •  valACYclovir (VALTREX) 1,000 mg tablet, Take 1 tablet (1,000 mg total) by mouth 2 (two) times a day for 5 days, Disp: 10 tablet, Rfl: 0  •  celecoxib (CeleBREX) 200 mg capsule, TAKE 1 CAPSULE TWICE A DAY BY MOUTH FOR PAIN/ANTI-INFLAMMATORY (Patient not taking: Reported on 3/2/2023), Disp: , Rfl:   •  celecoxib (CeleBREX) 200 mg capsule, Take 1 capsule (200 mg total) by mouth 2 (two) times a day for 7 days (Patient not taking: Reported on 3/2/2023), Disp: 14 capsule, Rfl: 0  •  cyclobenzaprine (FLEXERIL) 10 mg tablet, Take 1 tablet (10 mg total) by mouth 3 (three) times a day as needed for muscle spasms (Patient not taking: Reported on 12/26/2018), Disp: 30 tablet, Rfl: 2  •  diazepam (VALIUM) 5 mg tablet, Take 1 tablet (5 mg total) by mouth every 8 (eight) hours as needed for anxiety (Patient not taking: Reported on 7/23/2022), Disp: 30 tablet, Rfl: 1  • famotidine (PEPCID) 20 mg tablet, 1 TWICE A DAY FOR ANTI-ACID (Patient not taking: Reported on 3/2/2023), Disp: , Rfl:   •  gabapentin (NEURONTIN) 300 mg capsule, TAKE 1 CAPSULE 3X A DAY FOR NERVE PAIN (Patient not taking: Reported on 3/2/2023), Disp: , Rfl:   •  morphine (MSIR) 15 mg tablet, Take 1 tablet (15 mg total) by mouth every 4 (four) hours as needed for severe pain for up to 10 doses Max Daily Amount: 90 mg (Patient not taking: Reported on 2/23/2022), Disp: 10 tablet, Rfl: 0  •  nystatin (MYCOSTATIN) cream, Apply topically 2 (two) times a day for 7 days, Disp: 30 g, Rfl: 1  •  nystatin-triamcinolone (MYCOLOG-II) cream, Apply topically 2 (two) times a day for 10 days, Disp: 120 g, Rfl: 1  •  ondansetron (ZOFRAN) 8 mg tablet, Take 1 tablet (8 mg total) by mouth every 8 (eight) hours as needed for nausea or vomiting, Disp: 30 tablet, Rfl: 1  •  oxyCODONE-acetaminophen (PERCOCET)  mg per tablet, Take 1 tablet by mouth every 4 (four) hours as needed for moderate pain (Patient not taking: Reported on 2/16/2022), Disp: , Rfl:   •  predniSONE 20 mg tablet, Please take 3 -20 mg tabs X3 days , Please take 2 - 20mg tabs X 3 days, Please take 1-20 mg tab X1 days (Patient not taking: Reported on 2/23/2022), Disp: 16 tablet, Rfl: 0  •  sildenafil (REVATIO) 20 mg tablet, Take 1 tablet (20 mg total) by mouth 3 (three) times a day (Patient not taking: Reported on 2/16/2022), Disp: 10 tablet, Rfl: 1  •  traMADol (Ultram) 50 mg tablet, Take 1 tablet (50 mg total) by mouth every 6 (six) hours as needed for moderate pain (Patient not taking: Reported on 3/2/2023), Disp: 20 tablet, Rfl: 0    Current Allergies     Allergies as of 04/02/2023   • (No Known Allergies)            The following portions of the patient's history were reviewed and updated as appropriate: allergies, current medications, past family history, past medical history, past social history, past surgical history and problem list      Past Medical History: Diagnosis Date   • Back pain        Past Surgical History:   Procedure Laterality Date   • BACK SURGERY  09/2017   • HERNIA REPAIR      INGUINAL   • MASS EXCISION N/A 10/25/2017    Procedure: RIGHT BACK MASS EXCISION;  Surgeon: Enrike Eller MD;  Location: [facility];  Service: General   • NECK SURGERY         History reviewed  No pertinent family history  Medications have been verified  Objective   /88   Pulse 92   Temp 98 6 °F (37 °C) (Temporal)   Resp 18   SpO2 97%        Physical Exam     Physical Exam  Vitals and nursing note reviewed  Constitutional:       General: He is not in acute distress  Appearance: Normal appearance  He is not ill-appearing  HENT:      Head: Normocephalic and atraumatic  Right Ear: External ear normal  There is no impacted cerumen  No foreign body  Tympanic membrane is erythematous  Tympanic membrane is not injected or bulging  Left Ear: External ear normal  There is no impacted cerumen  No foreign body  Tympanic membrane is erythematous  Tympanic membrane is not injected or bulging  Ears:      Comments: L greater than R erythematous TM  Nose: Congestion present  Mouth/Throat:      Lips: Pink  Mouth: Mucous membranes are moist       Pharynx: Oropharynx is clear  No oropharyngeal exudate or posterior oropharyngeal erythema  Tonsils: No tonsillar exudate  Eyes:      General: Vision grossly intact  Right eye: Discharge present  No foreign body or hordeolum  Left eye: No foreign body, discharge or hordeolum  Extraocular Movements: Extraocular movements intact  Conjunctiva/sclera:      Right eye: Right conjunctiva is injected  Exudate (clear watery) present  Left eye: Left conjunctiva is not injected  No exudate  Pupils: Pupils are equal, round, and reactive to light  Right eye: Corneal abrasion and fluorescein uptake present     Cardiovascular:      Rate and Rhythm: Normal rate and regular rhythm  Heart sounds: Normal heart sounds  No murmur heard  Pulmonary:      Effort: Pulmonary effort is normal  No respiratory distress  Breath sounds: No decreased air movement  Examination of the right-lower field reveals wheezing  Examination of the left-lower field reveals wheezing  Wheezing present  No decreased breath sounds, rhonchi or rales  Abdominal:      General: Abdomen is flat  Bowel sounds are normal       Palpations: Abdomen is soft  Musculoskeletal:         General: Normal range of motion  Cervical back: Normal range of motion  Skin:     General: Skin is warm  Findings: Rash present  Rash is vesicular  Comments: Middle of upper lip with single vesicular lesion consistent with cold sore  No drainage, erythema, or swelling noted  Neurological:      Mental Status: He is alert and oriented to person, place, and time  Motor: Motor function is intact     Psychiatric:         Attention and Perception: Attention normal          Mood and Affect: Mood normal

## 2023-05-10 DIAGNOSIS — K21.9 GASTROESOPHAGEAL REFLUX DISEASE WITHOUT ESOPHAGITIS: ICD-10-CM

## 2023-05-10 RX ORDER — PANTOPRAZOLE SODIUM 20 MG/1
TABLET, DELAYED RELEASE ORAL
Qty: 90 TABLET | Refills: 1 | Status: SHIPPED | OUTPATIENT
Start: 2023-05-10

## 2023-06-08 ENCOUNTER — TELEPHONE (OUTPATIENT)
Dept: PSYCHIATRY | Facility: CLINIC | Age: 58
End: 2023-06-08

## 2023-06-15 NOTE — TELEPHONE ENCOUNTER
"Contacted patient in regards to routine referral, Man that answer the phone stated \"wrong number and stated he does not know patient and has this  Number for along time  Unable to contact patient   Will close out referral    "

## 2023-06-30 DIAGNOSIS — R21 SKIN RASH: ICD-10-CM

## 2023-06-30 RX ORDER — NYSTATIN 100000 U/G
CREAM TOPICAL AS NEEDED
Qty: 30 G | Refills: 1 | OUTPATIENT
Start: 2023-06-30

## 2023-08-25 DIAGNOSIS — R21 SKIN RASH: ICD-10-CM

## 2023-08-26 DIAGNOSIS — R21 SKIN RASH: ICD-10-CM

## 2023-08-28 RX ORDER — NYSTATIN 100000 U/G
CREAM TOPICAL AS NEEDED
Qty: 30 G | Refills: 1 | Status: SHIPPED | OUTPATIENT
Start: 2023-08-28

## 2023-11-19 ENCOUNTER — TELEPHONE (OUTPATIENT)
Dept: OTHER | Facility: OTHER | Age: 58
End: 2023-11-19

## 2023-11-20 NOTE — TELEPHONE ENCOUNTER
Tried to call the patient with the phone number listed on his chart, another man picks up and explains that we have the wrong number. He states he also got a text regarding an appointment for the patient and he is not Terell Lies. I am taking this phone number out of the patients chart so we do not keep calling the wrong person. If patient calls back, please update his phone number.

## 2023-11-21 ENCOUNTER — OFFICE VISIT (OUTPATIENT)
Dept: FAMILY MEDICINE CLINIC | Facility: CLINIC | Age: 58
End: 2023-11-21
Payer: MEDICARE

## 2023-11-21 VITALS
OXYGEN SATURATION: 98 % | WEIGHT: 150 LBS | SYSTOLIC BLOOD PRESSURE: 122 MMHG | DIASTOLIC BLOOD PRESSURE: 88 MMHG | HEART RATE: 95 BPM | HEIGHT: 65 IN | TEMPERATURE: 97.7 F | BODY MASS INDEX: 24.99 KG/M2

## 2023-11-21 DIAGNOSIS — Z12.5 ENCOUNTER FOR PROSTATE CANCER SCREENING: ICD-10-CM

## 2023-11-21 DIAGNOSIS — Z23 ENCOUNTER FOR IMMUNIZATION: ICD-10-CM

## 2023-11-21 DIAGNOSIS — R21 RASH: Primary | ICD-10-CM

## 2023-11-21 DIAGNOSIS — Z13.220 SCREENING FOR HYPERLIPIDEMIA: ICD-10-CM

## 2023-11-21 DIAGNOSIS — H60.93 OTITIS EXTERNA OF BOTH EARS, UNSPECIFIED CHRONICITY, UNSPECIFIED TYPE: ICD-10-CM

## 2023-11-21 DIAGNOSIS — R30.0 DYSURIA: ICD-10-CM

## 2023-11-21 PROCEDURE — 99214 OFFICE O/P EST MOD 30 MIN: CPT

## 2023-11-21 PROCEDURE — G0008 ADMIN INFLUENZA VIRUS VAC: HCPCS

## 2023-11-21 PROCEDURE — 90686 IIV4 VACC NO PRSV 0.5 ML IM: CPT

## 2023-11-21 RX ORDER — CIPROFLOXACIN AND DEXAMETHASONE 3; 1 MG/ML; MG/ML
4 SUSPENSION/ DROPS AURICULAR (OTIC) 2 TIMES DAILY
Qty: 2 ML | Refills: 0 | Status: SHIPPED | OUTPATIENT
Start: 2023-11-21 | End: 2023-11-26

## 2023-11-21 NOTE — PROGRESS NOTES
Assessment/Plan:    Rash  For  1 year, affecting the pelvic area. Mainly in the buttock area and anogenital area. Sparing the inguinal creases and scrotum. Described on exam as : erythematous, patch , mild tenderness , no active discharge/papules/pustules noted. Slightly improved  with nystatin cream       Patient reports showering 4 times a day , also not drying his skin , no moisturizure use. Plan: I think at this time it is irritant dermatitis vs tinea. Counseling and education about drying his skin after showering, limiting showering to once a day , gentle scrubbing only , apply csc-nystatin cream for 2 weeks. Diagnoses and all orders for this visit:    Rash  -     nystatin-triamcinolone (MYCOLOG-II) cream; Apply topically 2 (two) times a day for 21 days    Encounter for immunization  Comments:  tolerated  Orders:  -     influenza vaccine, quadrivalent, 0.5 mL, preservative-free, for adult and pediatric patients 6 mos+ (AFLURIA, FLUARIX, FLULAVAL, FLUZONE)    Dysuria  Comments:  2 partners in 6 months  check studies as documented  Orders:  -     Chlamydia/GC amplified DNA by PCR; Future  -     RPR-Syphilis Screening (Total Syphilis IGG/IGM); Future  -     UA w Reflex to Microscopic w Reflex to Culture    Otitis externa of both ears, unspecified chronicity, unspecified type  Comments:  contact dermatitis vs OE   use solution for 5 days  counseling about ear hygeine and limiting water and qtip use  Orders:  -     ciprofloxacin-dexamethasone (Ciprodex) otic suspension; Administer 4 drops into both ears 2 (two) times a day for 5 days    Screening for hyperlipidemia  -     Lipid Panel with Direct LDL reflex; Future    Encounter for prostate cancer screening  -     PSA, Total Screen;  Future          PHQ-2/9 Depression Screening    Little interest or pleasure in doing things: 0 - not at all  Feeling down, depressed, or hopeless: 0 - not at all  PHQ-2 Score: 0  PHQ-2 Interpretation: Negative depression screen            Subjective:      Patient ID: Umberto Ascencio is a 56 y.o. male. 79-year-old male presents to the office today complaining of skin rash involving his pelvic area. Reports that around 1 year ago he developed a mildly itchy rash in his buttock and and the genital area. He was prescribed steroid/nystatin cream with mild improvement however with no resolution at any time. Last use of the cream was 1 month ago. No drainage/discharge/vesicles noted per the patient    No history of similar rash prior to the 1 year period. No History of skin cancer. Of note; during the visit patient mentioned that he had been with 2 partners in the past 6-month none with similar skin rash or any sexually transmitted infections. No history of sexually transmitted infection. The following portions of the patient's history were reviewed and updated as appropriate: allergies, current medications, past family history, past medical history, past social history, past surgical history, and problem list.    Review of Systems   Constitutional: Negative. Negative for chills, fatigue and fever. HENT: Negative. Negative for hearing loss. Eyes: Negative. Negative for visual disturbance. Respiratory:  Negative for shortness of breath and wheezing. Cardiovascular:  Negative for chest pain and palpitations. Gastrointestinal:  Negative for abdominal pain, blood in stool, constipation, diarrhea, nausea and vomiting. Endocrine: Negative. Genitourinary:  Positive for dysuria. Negative for difficulty urinating. Musculoskeletal:  Negative for arthralgias and myalgias. Skin:  Positive for rash. Allergic/Immunologic: Negative. Neurological:  Negative for seizures and syncope. Hematological:  Negative for adenopathy. Psychiatric/Behavioral: Negative.            Objective:    /88   Pulse 95   Temp 97.7 °F (36.5 °C) (Tympanic)   Ht 5' 5" (1.651 m)   Wt 68 kg (150 lb)   SpO2 98%   BMI 24.96 kg/m²      Physical Exam  Vitals and nursing note reviewed. Constitutional:       General: He is not in acute distress. Appearance: Normal appearance. He is not ill-appearing or diaphoretic. HENT:      Head: Normocephalic and atraumatic. Eyes:      Conjunctiva/sclera: Conjunctivae normal.   Cardiovascular:      Rate and Rhythm: Normal rate and regular rhythm. Heart sounds: Normal heart sounds. No murmur heard. Pulmonary:      Effort: Pulmonary effort is normal. No respiratory distress. Breath sounds: Normal breath sounds. No wheezing, rhonchi or rales. Abdominal:      General: There is no distension. Palpations: Abdomen is soft. There is no mass. Tenderness: There is no abdominal tenderness. There is no guarding or rebound. Musculoskeletal:      Cervical back: Normal range of motion and neck supple. No rigidity. Right lower leg: No edema. Left lower leg: No edema. Lymphadenopathy:      Cervical: No cervical adenopathy. Skin:     Findings: Erythema and rash present. Comments: On the marked area above   Patchy areas the buttock area and her genital area sparing the scrotum and inguinal crease. No overlying crusting or scaling, active discharge or drainage. Neurological:      Mental Status: He is alert and oriented to person, place, and time. Psychiatric:         Mood and Affect: Mood normal.         Behavior: Behavior normal.         Thought Content:  Thought content normal.         Judgment: Judgment normal.

## 2023-11-21 NOTE — ASSESSMENT & PLAN NOTE
For  1 year, affecting the pelvic area. Mainly in the buttock area and anogenital area. Sparing the inguinal creases and scrotum. Described on exam as : erythematous, patch , mild tenderness , no active discharge/papules/pustules noted. Slightly improved  with nystatin cream       Patient reports showering 4 times a day , also not drying his skin , no moisturizure use. Plan: I think at this time it is irritant dermatitis vs tinea. Counseling and education about drying his skin after showering, limiting showering to once a day , gentle scrubbing only , apply csc-nystatin cream for 2 weeks.

## 2023-11-22 ENCOUNTER — LAB (OUTPATIENT)
Dept: LAB | Facility: CLINIC | Age: 58
End: 2023-11-22
Payer: MEDICARE

## 2023-11-22 DIAGNOSIS — Z11.59 NEED FOR HEPATITIS C SCREENING TEST: ICD-10-CM

## 2023-11-22 DIAGNOSIS — F10.90 ALCOHOL USE DISORDER: ICD-10-CM

## 2023-11-22 DIAGNOSIS — Z12.5 ENCOUNTER FOR PROSTATE CANCER SCREENING: ICD-10-CM

## 2023-11-22 DIAGNOSIS — Z11.4 SCREENING FOR HIV (HUMAN IMMUNODEFICIENCY VIRUS): ICD-10-CM

## 2023-11-22 DIAGNOSIS — Z13.220 SCREENING FOR HYPERLIPIDEMIA: ICD-10-CM

## 2023-11-22 DIAGNOSIS — R30.0 DYSURIA: ICD-10-CM

## 2023-11-22 LAB
BACTERIA UR QL AUTO: ABNORMAL /HPF
BILIRUB UR QL STRIP: NEGATIVE
CHOLEST SERPL-MCNC: 180 MG/DL
CLARITY UR: CLEAR
COLOR UR: ABNORMAL
GLUCOSE UR STRIP-MCNC: NEGATIVE MG/DL
HDLC SERPL-MCNC: 88 MG/DL
HGB UR QL STRIP.AUTO: NEGATIVE
KETONES UR STRIP-MCNC: NEGATIVE MG/DL
LDLC SERPL CALC-MCNC: 57 MG/DL (ref 0–100)
LEUKOCYTE ESTERASE UR QL STRIP: ABNORMAL
NITRITE UR QL STRIP: NEGATIVE
NON-SQ EPI CELLS URNS QL MICRO: ABNORMAL /HPF
PH UR STRIP.AUTO: 6 [PH]
PROT UR STRIP-MCNC: ABNORMAL MG/DL
PSA SERPL-MCNC: 1.08 NG/ML (ref 0–4)
RBC #/AREA URNS AUTO: ABNORMAL /HPF
SP GR UR STRIP.AUTO: 1.02 (ref 1–1.03)
TRIGL SERPL-MCNC: 176 MG/DL
UROBILINOGEN UR STRIP-ACNC: <2 MG/DL
WBC #/AREA URNS AUTO: ABNORMAL /HPF

## 2023-11-22 PROCEDURE — 81001 URINALYSIS AUTO W/SCOPE: CPT

## 2023-11-22 PROCEDURE — 80074 ACUTE HEPATITIS PANEL: CPT

## 2023-11-22 PROCEDURE — 87389 HIV-1 AG W/HIV-1&-2 AB AG IA: CPT

## 2023-11-22 PROCEDURE — 87591 N.GONORRHOEAE DNA AMP PROB: CPT

## 2023-11-22 PROCEDURE — G0103 PSA SCREENING: HCPCS

## 2023-11-22 PROCEDURE — 36415 COLL VENOUS BLD VENIPUNCTURE: CPT

## 2023-11-22 PROCEDURE — 86780 TREPONEMA PALLIDUM: CPT

## 2023-11-22 PROCEDURE — 87491 CHLMYD TRACH DNA AMP PROBE: CPT

## 2023-11-22 PROCEDURE — 80061 LIPID PANEL: CPT

## 2023-11-23 LAB
C TRACH DNA SPEC QL NAA+PROBE: POSITIVE
HAV IGM SER QL: NORMAL
HBV CORE IGM SER QL: NORMAL
HBV SURFACE AG SER QL: NORMAL
HCV AB SER QL: NORMAL
HIV 1+2 AB+HIV1 P24 AG SERPL QL IA: NORMAL
HIV 2 AB SERPL QL IA: NORMAL
HIV1 AB SERPL QL IA: NORMAL
HIV1 P24 AG SERPL QL IA: NORMAL
N GONORRHOEA DNA SPEC QL NAA+PROBE: NEGATIVE
TREPONEMA PALLIDUM IGG+IGM AB [PRESENCE] IN SERUM OR PLASMA BY IMMUNOASSAY: NORMAL

## 2023-11-24 ENCOUNTER — TELEPHONE (OUTPATIENT)
Dept: PULMONOLOGY | Facility: CLINIC | Age: 58
End: 2023-11-24

## 2023-11-24 DIAGNOSIS — A74.9 CHLAMYDIA INFECTION: Primary | ICD-10-CM

## 2023-11-24 DIAGNOSIS — A64 STI (SEXUALLY TRANSMITTED INFECTION): ICD-10-CM

## 2023-11-24 RX ORDER — AZITHROMYCIN 500 MG/1
TABLET, FILM COATED ORAL
Qty: 4 TABLET | Refills: 0 | Status: SHIPPED | OUTPATIENT
Start: 2023-11-24 | End: 2023-11-28

## 2023-11-24 NOTE — TELEPHONE ENCOUNTER
I called the patient and he would like to get a call back at lunch time  Will call back in the next 2 hrs

## 2023-11-24 NOTE — PROGRESS NOTES
I called the patient and we discussed his urine and blood studies. Urine study positive for chlamydia infection. Currently patient have 1 partner. Prior to total of 2 g azithromycin and I advised the patient to take 1 g of azithromycin on 1 dose and to give his partner the other 1 g. Also I advised the patient to make sure his partner is not pregnant at this time given that the medication could be contraindicated at specific trimesters. If she is pregnant she needs to be evaluated urgently by her OB/GYN or primary care provider    Should symptoms progress patient was encouraged to call the office.

## 2024-02-21 PROBLEM — H61.23 BILATERAL HEARING LOSS DUE TO CERUMEN IMPACTION: Status: RESOLVED | Noted: 2018-07-06 | Resolved: 2024-02-21

## 2024-05-13 DIAGNOSIS — R21 RASH: ICD-10-CM

## 2024-05-14 ENCOUNTER — OFFICE VISIT (OUTPATIENT)
Dept: FAMILY MEDICINE CLINIC | Facility: CLINIC | Age: 59
End: 2024-05-14
Payer: MEDICARE

## 2024-05-14 VITALS
OXYGEN SATURATION: 98 % | TEMPERATURE: 98.3 F | DIASTOLIC BLOOD PRESSURE: 78 MMHG | HEART RATE: 97 BPM | WEIGHT: 152.4 LBS | HEIGHT: 65 IN | SYSTOLIC BLOOD PRESSURE: 118 MMHG | BODY MASS INDEX: 25.39 KG/M2

## 2024-05-14 DIAGNOSIS — R21 SKIN RASH: ICD-10-CM

## 2024-05-14 DIAGNOSIS — R12 HEARTBURN: ICD-10-CM

## 2024-05-14 DIAGNOSIS — E66.3 OVERWEIGHT (BMI 25.0-29.9): Primary | ICD-10-CM

## 2024-05-14 DIAGNOSIS — F41.9 ANXIETY: ICD-10-CM

## 2024-05-14 DIAGNOSIS — J33.9 NASAL POLYPOSIS: ICD-10-CM

## 2024-05-14 DIAGNOSIS — Z86.19 HISTORY OF CHLAMYDIA INFECTION: ICD-10-CM

## 2024-05-14 DIAGNOSIS — Z23 ENCOUNTER FOR IMMUNIZATION: ICD-10-CM

## 2024-05-14 PROCEDURE — 99214 OFFICE O/P EST MOD 30 MIN: CPT

## 2024-05-14 PROCEDURE — 90750 HZV VACC RECOMBINANT IM: CPT

## 2024-05-14 PROCEDURE — 90471 IMMUNIZATION ADMIN: CPT

## 2024-05-14 RX ORDER — CELECOXIB 200 MG/1
200 CAPSULE ORAL DAILY
Qty: 60 CAPSULE | Refills: 0 | Status: SHIPPED | OUTPATIENT
Start: 2024-05-14 | End: 2024-07-13

## 2024-05-14 RX ORDER — NYSTATIN AND TRIAMCINOLONE ACETONIDE 100000; 1 [USP'U]/G; MG/G
CREAM TOPICAL 2 TIMES DAILY
Qty: 120 G | Refills: 1 | OUTPATIENT
Start: 2024-05-14 | End: 2024-06-04

## 2024-05-14 RX ORDER — NYSTATIN 100000 U/G
CREAM TOPICAL AS NEEDED
Qty: 30 G | Refills: 1 | Status: SHIPPED | OUTPATIENT
Start: 2024-05-14

## 2024-05-14 NOTE — PROGRESS NOTES
Assessment/Plan:    No problem-specific Assessment & Plan notes found for this encounter.       Diagnoses and all orders for this visit:    Overweight (BMI 25.0-29.9)  -     CBC and differential; Future  -     Comprehensive metabolic panel; Future  -     TSH, 3rd generation with Free T4 reflex; Future    Encounter for immunization  -     Zoster Vaccine Recombinant IM    Skin rash  -     nystatin (MYCOSTATIN) cream; Apply topically as needed (as needed for rash)    Heartburn  Comments:  stable. no alarming symptoms.  Orders:  -     celecoxib (CeleBREX) 200 mg capsule; Take 1 capsule (200 mg total) by mouth daily    Anxiety  -     CBC and differential; Future  -     Comprehensive metabolic panel; Future  -     TSH, 3rd generation with Free T4 reflex; Future    History of chlamydia infection  Comments:  treated on 11/2023 along with partner.   recheck  Orders:  -     Chlamydia/GC amplified DNA by PCR; Future    Nasal polyposis  Comments:  LT nostril , chronic condition.  AMB referral to ENT  Orders:  -     Ambulatory Referral to Otolaryngology; Future          PHQ-2/9 Depression Screening    Little interest or pleasure in doing things: 0 - not at all  Feeling down, depressed, or hopeless: 0 - not at all  PHQ-2 Score: 0  PHQ-2 Interpretation: Negative depression screen            Subjective:      Patient ID: Glen Wong is a 58 y.o. male.    58 year old male presents to the office today for follow up .   He reports that several years ago he noted a polyp within his left nostril. It have been the same in size since onset. No epistaxis/nasal discharge noted.   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 No recent activity.     The finding above is affecting his quality of sleep at night and his partner noticed that he snores as well.       Otherwise No chest pain or tightness, SOB or cough, dizziness or light headedness, N/V, Diarrhea or constipation.   No active urinary  "symptoms  Tolerating oral diet.       Ofnote; he was found to have chlamydia STI on 11/2023 . He reports taking the antibiotic along with his partner.           The following portions of the patient's history were reviewed and updated as appropriate: allergies, current medications, past family history, past medical history, past social history, past surgical history, and problem list.    Review of Systems   Constitutional: Negative.  Negative for chills, fatigue and fever.   HENT: Negative.  Negative for hearing loss.    Eyes: Negative.  Negative for visual disturbance.   Respiratory:  Positive for apnea. Negative for shortness of breath and wheezing.    Cardiovascular:  Negative for chest pain and palpitations.   Gastrointestinal:  Negative for abdominal pain, blood in stool, constipation, diarrhea, nausea and vomiting.   Endocrine: Negative.    Genitourinary:  Negative for difficulty urinating and dysuria.   Musculoskeletal:  Negative for arthralgias and myalgias.   Skin: Negative.    Allergic/Immunologic: Negative.    Neurological:  Negative for seizures and syncope.   Hematological:  Negative for adenopathy.   Psychiatric/Behavioral: Negative.           Objective:    /78 (BP Location: Left arm, Patient Position: Sitting, Cuff Size: Standard)   Pulse 97   Temp 98.3 °F (36.8 °C) (Tympanic)   Ht 5' 5\" (1.651 m)   Wt 69.1 kg (152 lb 6.4 oz)   SpO2 98%   BMI 25.36 kg/m²      Physical Exam  Constitutional:       General: He is awake.      Appearance: Normal appearance.   HENT:      Head: Normocephalic and atraumatic.      Nose: Nose normal.      Right Turbinates: Not enlarged, swollen or pale.      Left Turbinates: Swollen.      Right Sinus: No maxillary sinus tenderness or frontal sinus tenderness.      Left Sinus: No maxillary sinus tenderness or frontal sinus tenderness.      Comments: Polyp noted in the LT nostril.   No active bleeding/discharge/foreign body.      Mouth/Throat:      Mouth: Mucous " membranes are moist.      Pharynx: Oropharynx is clear.   Eyes:      General: No scleral icterus.     Conjunctiva/sclera: Conjunctivae normal.      Pupils: Pupils are equal, round, and reactive to light.   Cardiovascular:      Rate and Rhythm: Normal rate and regular rhythm.      Pulses: Normal pulses.           Radial pulses are 2+ on the right side and 2+ on the left side.      Heart sounds: Normal heart sounds, S1 normal and S2 normal. No murmur heard.     No friction rub. No gallop.   Pulmonary:      Effort: Pulmonary effort is normal. No respiratory distress.      Breath sounds: Normal breath sounds and air entry. No stridor. No wheezing, rhonchi or rales.   Abdominal:      General: Bowel sounds are normal.      Palpations: Abdomen is soft. There is no mass.      Tenderness: There is no abdominal tenderness. There is no guarding.   Musculoskeletal:      Right lower leg: No edema.      Left lower leg: No edema.   Lymphadenopathy:      Cervical: No cervical adenopathy.      Upper Body:      Right upper body: No axillary adenopathy.      Left upper body: No axillary adenopathy.   Skin:     General: Skin is warm and dry.   Neurological:      Mental Status: He is alert and oriented to person, place, and time. Mental status is at baseline.   Psychiatric:         Mood and Affect: Mood normal.

## 2024-06-24 ENCOUNTER — LAB (OUTPATIENT)
Dept: LAB | Facility: CLINIC | Age: 59
End: 2024-06-24
Payer: MEDICARE

## 2024-06-24 DIAGNOSIS — R21 SKIN RASH: ICD-10-CM

## 2024-06-24 DIAGNOSIS — Z86.19 HISTORY OF CHLAMYDIA INFECTION: ICD-10-CM

## 2024-06-24 DIAGNOSIS — E66.3 OVERWEIGHT (BMI 25.0-29.9): ICD-10-CM

## 2024-06-24 DIAGNOSIS — F41.9 ANXIETY: ICD-10-CM

## 2024-06-24 LAB
ALBUMIN SERPL BCG-MCNC: 4.2 G/DL (ref 3.5–5)
ALP SERPL-CCNC: 55 U/L (ref 34–104)
ALT SERPL W P-5'-P-CCNC: 27 U/L (ref 7–52)
ANION GAP SERPL CALCULATED.3IONS-SCNC: 9 MMOL/L (ref 4–13)
AST SERPL W P-5'-P-CCNC: 17 U/L (ref 13–39)
BASOPHILS # BLD AUTO: 0.08 THOUSANDS/ÂΜL (ref 0–0.1)
BASOPHILS NFR BLD AUTO: 1 % (ref 0–1)
BILIRUB SERPL-MCNC: 0.76 MG/DL (ref 0.2–1)
BUN SERPL-MCNC: 13 MG/DL (ref 5–25)
CALCIUM SERPL-MCNC: 9.2 MG/DL (ref 8.4–10.2)
CHLORIDE SERPL-SCNC: 106 MMOL/L (ref 96–108)
CO2 SERPL-SCNC: 24 MMOL/L (ref 21–32)
CREAT SERPL-MCNC: 0.83 MG/DL (ref 0.6–1.3)
EOSINOPHIL # BLD AUTO: 0.28 THOUSAND/ÂΜL (ref 0–0.61)
EOSINOPHIL NFR BLD AUTO: 4 % (ref 0–6)
ERYTHROCYTE [DISTWIDTH] IN BLOOD BY AUTOMATED COUNT: 12.5 % (ref 11.6–15.1)
GFR SERPL CREATININE-BSD FRML MDRD: 98 ML/MIN/1.73SQ M
GLUCOSE P FAST SERPL-MCNC: 65 MG/DL (ref 65–99)
HCT VFR BLD AUTO: 46.1 % (ref 36.5–49.3)
HGB BLD-MCNC: 15.6 G/DL (ref 12–17)
IMM GRANULOCYTES # BLD AUTO: 0.04 THOUSAND/UL (ref 0–0.2)
IMM GRANULOCYTES NFR BLD AUTO: 1 % (ref 0–2)
LYMPHOCYTES # BLD AUTO: 2.1 THOUSANDS/ÂΜL (ref 0.6–4.47)
LYMPHOCYTES NFR BLD AUTO: 28 % (ref 14–44)
MCH RBC QN AUTO: 30.6 PG (ref 26.8–34.3)
MCHC RBC AUTO-ENTMCNC: 33.8 G/DL (ref 31.4–37.4)
MCV RBC AUTO: 90 FL (ref 82–98)
MONOCYTES # BLD AUTO: 0.48 THOUSAND/ÂΜL (ref 0.17–1.22)
MONOCYTES NFR BLD AUTO: 6 % (ref 4–12)
NEUTROPHILS # BLD AUTO: 4.57 THOUSANDS/ÂΜL (ref 1.85–7.62)
NEUTS SEG NFR BLD AUTO: 60 % (ref 43–75)
NRBC BLD AUTO-RTO: 0 /100 WBCS
PLATELET # BLD AUTO: 327 THOUSANDS/UL (ref 149–390)
PMV BLD AUTO: 9.7 FL (ref 8.9–12.7)
POTASSIUM SERPL-SCNC: 4.9 MMOL/L (ref 3.5–5.3)
PROT SERPL-MCNC: 6.6 G/DL (ref 6.4–8.4)
RBC # BLD AUTO: 5.1 MILLION/UL (ref 3.88–5.62)
SODIUM SERPL-SCNC: 139 MMOL/L (ref 135–147)
TSH SERPL DL<=0.05 MIU/L-ACNC: 1.55 UIU/ML (ref 0.45–4.5)
WBC # BLD AUTO: 7.55 THOUSAND/UL (ref 4.31–10.16)

## 2024-06-24 PROCEDURE — 84443 ASSAY THYROID STIM HORMONE: CPT

## 2024-06-24 PROCEDURE — 87591 N.GONORRHOEAE DNA AMP PROB: CPT

## 2024-06-24 PROCEDURE — 87491 CHLMYD TRACH DNA AMP PROBE: CPT

## 2024-06-24 PROCEDURE — 85025 COMPLETE CBC W/AUTO DIFF WBC: CPT

## 2024-06-24 PROCEDURE — 80053 COMPREHEN METABOLIC PANEL: CPT

## 2024-06-24 PROCEDURE — 36415 COLL VENOUS BLD VENIPUNCTURE: CPT

## 2024-06-25 LAB
C TRACH DNA SPEC QL NAA+PROBE: NEGATIVE
N GONORRHOEA DNA SPEC QL NAA+PROBE: NEGATIVE

## 2024-06-25 RX ORDER — NYSTATIN 100000 U/G
CREAM TOPICAL AS NEEDED
Qty: 30 G | Refills: 1 | Status: SHIPPED | OUTPATIENT
Start: 2024-06-25

## 2024-07-10 DIAGNOSIS — R12 HEARTBURN: ICD-10-CM

## 2024-07-10 RX ORDER — CELECOXIB 200 MG/1
200 CAPSULE ORAL DAILY
Qty: 60 CAPSULE | Refills: 0 | Status: SHIPPED | OUTPATIENT
Start: 2024-07-10

## 2024-11-13 ENCOUNTER — OFFICE VISIT (OUTPATIENT)
Dept: FAMILY MEDICINE CLINIC | Facility: CLINIC | Age: 59
End: 2024-11-13
Payer: MEDICARE

## 2024-11-13 VITALS
TEMPERATURE: 98.5 F | OXYGEN SATURATION: 98 % | HEART RATE: 91 BPM | WEIGHT: 152.5 LBS | BODY MASS INDEX: 25.41 KG/M2 | SYSTOLIC BLOOD PRESSURE: 136 MMHG | DIASTOLIC BLOOD PRESSURE: 84 MMHG | HEIGHT: 65 IN

## 2024-11-13 DIAGNOSIS — Z12.11 SCREENING FOR COLON CANCER: ICD-10-CM

## 2024-11-13 DIAGNOSIS — J33.9 NASAL POLYPOSIS: ICD-10-CM

## 2024-11-13 DIAGNOSIS — F39 UNSPECIFIED MOOD (AFFECTIVE) DISORDER (HCC): ICD-10-CM

## 2024-11-13 DIAGNOSIS — Z23 ENCOUNTER FOR IMMUNIZATION: ICD-10-CM

## 2024-11-13 DIAGNOSIS — H43.393 VITREOUS FLOATERS OF BOTH EYES: Primary | ICD-10-CM

## 2024-11-13 PROCEDURE — 90673 RIV3 VACCINE NO PRESERV IM: CPT

## 2024-11-13 PROCEDURE — 99214 OFFICE O/P EST MOD 30 MIN: CPT

## 2024-11-13 PROCEDURE — G0008 ADMIN INFLUENZA VIRUS VAC: HCPCS

## 2024-11-13 RX ORDER — IPRATROPIUM BROMIDE 21 UG/1
2 SPRAY, METERED NASAL EVERY 12 HOURS
Qty: 30 ML | Refills: 0 | Status: SHIPPED | OUTPATIENT
Start: 2024-11-13

## 2024-11-13 NOTE — PROGRESS NOTES
Ambulatory Visit  Name: Glen Wong      : 1965      MRN: [id number]  Encounter Provider: HITESH Orona  Encounter Date: 2024   Encounter department: [facility]    Assessment & Plan  Vitreous floaters of both eyes    Presents for f/u.  Requesting Ophthal referral and nasal spray.  Otherwise voices no concerns.  Labs reviewed from 2024.  Previous PCP notes reviewed.    Orders:    Ambulatory Referral to Ophthalmology; Future    Nasal polyposis    Orders:    ipratropium (ATROVENT) 0.03 % nasal spray; 2 sprays into each nostril every 12 (twelve) hours    Unspecified mood (affective) disorder (HCC)         Encounter for immunization    Counseled.    Orders:    influenza vaccine, recombinant, PF, 0.5 mL IM (Flublok)    Screening for colon cancer    Orders:    Cologuard      Depression Screening and Follow-up Plan: Patient was screened for depression during today's encounter. They screened negative with a PHQ-2 score of 0.    Tobacco Cessation Counseling: Tobacco cessation counseling was provided. The patient is sincerely urged to quit consumption of tobacco. He is not ready to quit tobacco. Medication options and side effects of medication discussed. Patient refused medication.       History of Present Illness         History obtained from : patient  Review of Systems   Constitutional:  Negative for chills, fatigue and fever.   HENT:  Negative for congestion, ear discharge, ear pain, facial swelling, rhinorrhea, sinus pressure, sinus pain, sore throat and trouble swallowing.    Eyes:  Negative for photophobia, pain and visual disturbance.   Respiratory:  Negative for cough, chest tightness, shortness of breath and wheezing.    Cardiovascular:  Negative for chest pain, palpitations and leg swelling.   Gastrointestinal:  Negative for abdominal pain, diarrhea, nausea and vomiting.   Genitourinary:  Negative for dysuria, flank pain and hematuria.   Musculoskeletal:   Negative for arthralgias, back pain, myalgias and neck pain.   Skin:  Negative for pallor and wound.   Neurological:  Negative for dizziness, syncope, weakness, numbness and headaches.   Psychiatric/Behavioral:  Negative for confusion and sleep disturbance.    All other systems reviewed and are negative.    Medical History Reviewed by provider this encounter:  Tobacco  Allergies  Meds  Problems  Med Hx  Surg Hx  Fam Hx       Current Outpatient Medications on File Prior to Visit   Medication Sig Dispense Refill    nystatin (MYCOSTATIN) cream Apply topically as needed (as needed for rash) 30 g 1    [DISCONTINUED] celecoxib (CeleBREX) 200 mg capsule TAKE 1 CAPSULE BY MOUTH EVERY DAY (Patient not taking: Reported on 11/13/2024) 60 capsule 0    [DISCONTINUED] ciprofloxacin-dexamethasone (Ciprodex) otic suspension Administer 4 drops into both ears 2 (two) times a day for 5 days (Patient not taking: Reported on 11/13/2024) 2 mL 0    [DISCONTINUED] cyclobenzaprine (FLEXERIL) 10 mg tablet Take 1 tablet (10 mg total) by mouth 3 (three) times a day as needed for muscle spasms (Patient not taking: Reported on 11/13/2024) 30 tablet 2    [DISCONTINUED] diazepam (VALIUM) 5 mg tablet Take 1 tablet (5 mg total) by mouth every 8 (eight) hours as needed for anxiety (Patient not taking: Reported on 11/13/2024) 30 tablet 1    [DISCONTINUED] gabapentin (NEURONTIN) 300 mg capsule TAKE 1 CAPSULE 3X A DAY FOR NERVE PAIN (Patient not taking: Reported on 11/13/2024)      [DISCONTINUED] morphine (MSIR) 15 mg tablet Take 1 tablet (15 mg total) by mouth every 4 (four) hours as needed for severe pain for up to 10 doses Max Daily Amount: 90 mg (Patient not taking: Reported on 11/13/2024) 10 tablet 0    [DISCONTINUED] nystatin (MYCOSTATIN) cream APPLY TOPICALLY AS NEEDED (AS NEEDED FOR RASH) (Patient not taking: Reported on 11/13/2024) 30 g 1    [DISCONTINUED] nystatin-triamcinolone (MYCOLOG-II) cream Apply topically 2 (two) times a day for  "21 days (Patient not taking: Reported on 11/13/2024) 120 g 1    [DISCONTINUED] pantoprazole (PROTONIX) 20 mg tablet TAKE 1 TABLET BY MOUTH DAILY BEFORE BREAKFAST. (Patient not taking: Reported on 11/13/2024) 90 tablet 1     No current facility-administered medications on file prior to visit.      Social History     Tobacco Use    Smoking status: Some Days     Current packs/day: 0.10     Types: Cigarettes     Passive exposure: Never    Smokeless tobacco: Never   Vaping Use    Vaping status: Never Used   Substance and Sexual Activity    Alcohol use: Yes     Comment: socially    Drug use: Yes     Types: Marijuana    Sexual activity: Not on file         Objective     /84 (Patient Position: Sitting)   Pulse 91   Temp 98.5 °F (36.9 °C) (Tympanic)   Ht 5' 5\" (1.651 m)   Wt 69.2 kg (152 lb 8 oz)   SpO2 98%   BMI 25.38 kg/m²     Physical Exam  Vitals and nursing note reviewed.   Constitutional:       General: He is not in acute distress.     Appearance: Normal appearance. He is well-developed. He is not ill-appearing.   HENT:      Head: Normocephalic and atraumatic.      Jaw: There is normal jaw occlusion.      Right Ear: Tympanic membrane normal.      Left Ear: Tympanic membrane normal.      Nose: Nose normal.      Mouth/Throat:      Pharynx: Oropharynx is clear. No oropharyngeal exudate or posterior oropharyngeal erythema.   Eyes:      Extraocular Movements: Extraocular movements intact.      Conjunctiva/sclera: Conjunctivae normal.   Neck:      Thyroid: No thyroid mass.      Vascular: No carotid bruit or JVD.      Trachea: Trachea and phonation normal.   Cardiovascular:      Rate and Rhythm: Normal rate and regular rhythm.      Heart sounds: S1 normal and S2 normal. No murmur heard.  Pulmonary:      Effort: Pulmonary effort is normal. No tachypnea or respiratory distress.      Breath sounds: Normal breath sounds and air entry. No stridor. No decreased breath sounds.   Chest:      Chest wall: No tenderness. "   Abdominal:      General: Bowel sounds are normal. There is no distension.      Palpations: Abdomen is soft.      Tenderness: There is no abdominal tenderness. There is no right CVA tenderness or left CVA tenderness.   Musculoskeletal:         General: No swelling.      Cervical back: Normal range of motion and neck supple.      Right lower leg: No edema.      Left lower leg: No edema.   Lymphadenopathy:      Cervical: No cervical adenopathy.   Skin:     General: Skin is warm and dry.      Capillary Refill: Capillary refill takes less than 2 seconds.      Findings: No rash.   Neurological:      General: No focal deficit present.      Mental Status: He is alert and oriented to person, place, and time.      Cranial Nerves: Cranial nerves 2-12 are intact.      Sensory: Sensation is intact.      Motor: Motor function is intact.      Coordination: Coordination is intact.      Gait: Gait is intact.   Psychiatric:         Mood and Affect: Mood normal.         Behavior: Behavior is cooperative.

## 2024-11-18 ENCOUNTER — TELEPHONE (OUTPATIENT)
Age: 59
End: 2024-11-18

## 2025-02-27 ENCOUNTER — TELEPHONE (OUTPATIENT)
Age: 60
End: 2025-02-27

## 2025-02-27 NOTE — TELEPHONE ENCOUNTER
Patient called in says he has glaucoma , wants to know if PCP has any referrals for an eye specialst. Please advise. Thank you!    Glen: [phone]

## 2025-02-28 NOTE — TELEPHONE ENCOUNTER
Spoke to patient provided him with Dr. Sterling in [place] the phone number is [phone] he will call back to confirm number as he was driving at the time

## 2025-03-07 ENCOUNTER — OFFICE VISIT (OUTPATIENT)
Age: 60
End: 2025-03-07
Payer: MEDICARE

## 2025-03-07 DIAGNOSIS — H25.13 NUCLEAR SCLEROSIS OF BOTH EYES: Primary | ICD-10-CM

## 2025-03-07 DIAGNOSIS — H43.393 VITREOUS FLOATERS OF BOTH EYES: ICD-10-CM

## 2025-03-07 PROCEDURE — 92004 COMPRE OPH EXAM NEW PT 1/>: CPT | Performed by: OPHTHALMOLOGY

## 2025-03-07 NOTE — PROGRESS NOTES
Name: Glen Wong      : 1965      MRN: [id number]  Encounter Provider: Sanjuana Haddad MD  Encounter Date: 3/7/2025   Encounter department: [facility] OPHTHALMOLOGY  :  Assessment & Plan  Nuclear sclerosis of both eyes  Visually significant at this time. Refer to Dr. Esteban for evaluation.          Vitreous floaters of both eyes    Orders:    Ambulatory Referral to Ophthalmology  Pt has syneresis; No retinal tears, breaks, or detachments on dilated examination; Recommend monitoring; Retinal detachment precautions were discussed with the patient. The patient was instructed to call or return immediately for an increase in flashes of light, floaters (dark spots in vision), or curtaining of the visual field.         Glen Wong is a 59 y.o. male who presents for evaluation of worsening vision.    He reports for the past few months he has noticed he cannot see as well with his glasses and feels like he misses things on the road when driving.    3 months ago was told he has glaucoma by an optometrist in [place] - vision started worsening then. He then saw his PCP who referred him here today.  He was not given any eye drops to use.     Sometimes he feels he can see something moving across his vision. Denies flashes of light.     History obtained from: patient    Review of Systems  Medical History Reviewed by provider this encounter:  Tobacco  Allergies  Meds  Problems  Med Hx  Surg Hx  Fam Hx     .     Past Ocular History: refr err, hx of corneal FB  Ocular Meds/Drops: none    Base Eye Exam       Visual Acuity (Snellen - Linear)         Right Left    Dist sc 20/40-2 20/70+    Dist ph sc 20/30-1 NI              Tonometry (Applanation, 11:56 AM)         Right Left    Pressure 14 16              Pupils         Pupils    Right PERRL    Left PERRL              Visual Fields         Left Right     Full Full              Extraocular Movement         Right Left     Full Full               Neuro/Psych       Oriented x3: Yes              Dilation       Both eyes: 2.5% Phenylephrine, 1% Tropicamide @ 11:58 AM                  Slit Lamp and Fundus Exam       External Exam         Right Left    External Normal Normal              Slit Lamp Exam         Right Left    Lids/Lashes Normal Normal    Conjunctiva/Sclera White and quiet White and quiet    Cornea Clear Clear    Anterior Chamber Deep and quiet Deep and quiet    Iris Round and reactive Round and reactive    Lens 2+ NSC with cortical 2+ Nuclear sclerosis, Cortical spokes; trace pigment on anterior lens capsule    Anterior Vitreous No PVD, clear, no cell No PVD, clear, no cell              Fundus Exam         Right Left    Disc sharp, no pallor sharp, no pallor    C/D Ratio 0.25 0.2    Macula flat/no heme / good foveal reflex flat/no heme / good foveal reflex    Vessels normal in caliber normal in caliber    Periphery flat, no retinal tear or detachment flat, no retinal tear or detachment                      IMAGING:

## 2025-03-13 ENCOUNTER — OFFICE VISIT (OUTPATIENT)
Age: 60
End: 2025-03-13
Payer: COMMERCIAL

## 2025-03-13 DIAGNOSIS — H25.813 COMBINED FORM OF SENILE CATARACT OF BOTH EYES: Primary | ICD-10-CM

## 2025-03-13 DIAGNOSIS — H25.812 COMBINED FORMS OF AGE-RELATED CATARACT OF LEFT EYE: ICD-10-CM

## 2025-03-13 LAB
A LENGTH (OD): 24.25
A LENGTH (OS): 24.41
AC IOL (OD): 3.4
AC IOL (OS): 3.4
WHITE TO WHITE (OD): 11.55 MM
WHITE TO WHITE (OS): 11.55 MM

## 2025-03-13 PROCEDURE — 99204 OFFICE O/P NEW MOD 45 MIN: CPT | Performed by: OPHTHALMOLOGY

## 2025-03-13 PROCEDURE — 92136 OPHTHALMIC BIOMETRY: CPT | Performed by: OPHTHALMOLOGY

## 2025-03-13 RX ORDER — CYCLOPENTOLATE HYDROCHLORIDE 10 MG/ML
1 SOLUTION/ DROPS OPHTHALMIC
Status: CANCELLED | OUTPATIENT
Start: 2025-03-13 | End: 2025-03-13

## 2025-03-13 RX ORDER — TROPICAMIDE 10 MG/ML
1 SOLUTION/ DROPS OPHTHALMIC
Status: CANCELLED | OUTPATIENT
Start: 2025-03-13 | End: 2025-03-13

## 2025-03-13 RX ORDER — PHENYLEPHRINE HYDROCHLORIDE 25 MG/ML
1 SOLUTION/ DROPS OPHTHALMIC
Status: CANCELLED | OUTPATIENT
Start: 2025-03-13 | End: 2025-03-13

## 2025-03-13 RX ORDER — DIFLUPREDNATE OPHTHALMIC 0.5 MG/ML
EMULSION OPHTHALMIC
Qty: 5 ML | Refills: 6 | Status: ON HOLD | OUTPATIENT
Start: 2025-03-13 | End: 2025-05-07

## 2025-03-13 RX ORDER — MOXIFLOXACIN 5 MG/ML
SOLUTION/ DROPS OPHTHALMIC
Qty: 3 ML | Refills: 6 | Status: SHIPPED | OUTPATIENT
Start: 2025-03-13 | End: 2025-05-16 | Stop reason: SDUPTHER

## 2025-03-13 NOTE — PROGRESS NOTES
Name: Glen Wong      : 1965      MRN: [id number]  Encounter Provider: Idris Esteban DO  Encounter Date: 3/13/2025   Encounter department: [facility] OPHTHALMOLOGY  :  Assessment & Plan            Glen Wong is a 59 y.o. male who presents Cataract eval per Dr ROACH, pt c/o blurry vision Os x 2 yrs, can not drive at night eduardo to his vision  History obtained from: patient    Review of Systems  Medical History Reviewed by provider this encounter:     .     Past Ocular History:cat OU  Ocular Meds/Drops: none    Not recorded           IMAGING:

## 2025-03-13 NOTE — PROGRESS NOTES
Name: Glen Wong      : 1965      MRN: [id number]  Encounter Provider: Idris Esteban DO  Encounter Date: 3/13/2025   Encounter department: [facility] OPHTHALMOLOGY  :  Assessment & Plan  Combined form of senile cataract of both eyes  -os>od    Patient with visually significant cataract of the left eye that is interfering with activities of daily living.  Reviewed the risks, benefits, and alternatives to cataract surgery of the left eye.  After discussing with the patient, the patient voiced a desire to proceed with cataract surgery of the left eye.  We reviewed IOL options and patient desires a monofocal IOL set for distance vision.  Patient understands the need for reading glasses following this procedure and the possible need for distance glasses following this procedure.      Orders:  •  IOL Biometry - OU - Both Eyes  •  moxifloxacin (VIGAMOX) 0.5 % ophthalmic solution; Instill 1 drop in left eye 4 times a day after your eye procedure  •  Difluprednate (Durezol) 0.05 % EMUL; Instill 1 drop in left eye 4 times a day after your eye procedure    Combined forms of age-related cataract of left eye  -AS ABOVE  Orders:  •  Case request operating room: EXTRACTION EXTRACAPSULAR CATARACT PHACO INTRAOCULAR LENS (IOL); Standing    Return for ce/iol os.          Glen Wong is a 59 y.o. male who presents   HPI     Cataract    In left eye.  Associated symptoms include blurred vision.  Severity is severe.           Comments    Ref for cataract eval; +pplov os>od      POH:  ref error  POSH:  none  FOH:  none  OC MEDS:  none            Last edited by Idris Esteban DO on 3/13/2025 10:28 AM.          History obtained from: patient    Review of Systems         Base Eye Exam     Visual Acuity (Snellen - Linear)       Right Left    Dist sc 20/50 20/70    Dist ph sc NI NI          Tonometry (Applanation, 10:22 AM)       Right Left    Pressure 14 15          Pupils       Pupils Dark APD    Right PERRL 3  None    Left PERRL 3 None          Visual Fields       Left Right     Full Full          Extraocular Movement       Right Left     Full, Ortho Full, Ortho          Neuro/Psych     Oriented x3: Yes    Mood/Affect: Normal          Dilation     Both eyes: 2.5% Phenylephrine @ 10:23 AM          Dilation #2     Both eyes: 1.0% Mydriacyl @ 10:24 AM            Slit Lamp and Fundus Exam     External Exam       Right Left    External Normal Normal          Slit Lamp Exam       Right Left    Lids/Lashes Normal Normal    Conjunctiva/Sclera White and quiet White and quiet    Cornea temp and central small scars; +small n pteryg Clear    Anterior Chamber Deep and quiet Deep and quiet    Iris pharm dil pharm dil    Lens 2+ NSC with cortical changes 2+ Nuclear sclerosis, Cortical spokes; trace pigment on anterior lens capsule    Anterior Vitreous No PVD, clear, no cell No PVD, clear, no cell    Pharm dilated ou prior to doctor exam             Fundus Exam       Right Left    Disc sharp, no pallor sharp, no pallor    C/D Ratio 0.25 0.2    Macula flat/no heme / good foveal reflex flat/no heme / good foveal reflex    Vessels normal in caliber normal in caliber    Periphery flat, no retinal tear or detachment flat, no retinal tear or detachment            Refraction     Manifest Refraction (Auto)       Sphere Cylinder [place] Dist [place]    Right -3.50 -2.00 075 20/50    Left -8.00 -0.75 166 20/70                    IMAGING:  IOL Biometry - OU - Both Eyes      Component Value Flag Ref Range Units Status    AC IOL (OS) 3.4            AC IOL (OD) 3.4            White to White (OS) 11.55       mm     White to White (OD) 11.55       mm     A LENGTH (OS) 24.41            A LENGTH (OD) 24.25                     Right Eye  Lens style: CNA0T0. Target refraction: -0.50. Axial length was 24.25. White to white was 11.55 mm. AC Depth was 3.4.     Left Eye  Lens style: CNA0T0. Lens power: 20.5. Target refraction: -0.50. Axial length was 24.41. White to  white was 11.55 mm. AC Depth was 3.4.     Notes  BU2: OS

## 2025-03-13 NOTE — H&P
S:    -58yo male with progressive decline in quality of vision os>od     PMH:  -none    Meds:  -atrovent    O: See Ophth encounter with me dated 3-13-25 for full ophth exam        PHYSICAL EXAMINATION:      Head:  normocephalic / atraumatic ; cataract os>od     Ears:  hearing intact to voice      Nose:  nares clear      Throat:  throat normal      Respiratory:  CTA      Neck:  clear      Cardiovascular:  NSR      Abdomen:  soft      Extremities:  negative edema      Neurologic:  grossly intact      Mental Status/Communication:  within range of normal variation      Physical Disabilities: none            IMPRESSION:      Combined senile Cataract os>od           PLAN:        Ce/iol os - we will schedule

## 2025-03-18 ENCOUNTER — TELEPHONE (OUTPATIENT)
Age: 60
End: 2025-03-18

## 2025-03-18 NOTE — TELEPHONE ENCOUNTER
Call made to pt on numerous occasions to schedule cataract surgery. Not able to leave a voicemail. Will try again later.

## 2025-03-19 NOTE — TELEPHONE ENCOUNTER
Patient called in to schedule cataract surgery, surgical coordinator not in at the moment, patient will hear back from office soon. Patient understood.

## 2025-03-20 ENCOUNTER — PREP FOR PROCEDURE (OUTPATIENT)
Age: 60
End: 2025-03-20

## 2025-03-20 DIAGNOSIS — H25.811 COMBINED FORMS OF AGE-RELATED CATARACT OF RIGHT EYE: Primary | ICD-10-CM

## 2025-03-24 ENCOUNTER — TELEPHONE (OUTPATIENT)
Age: 60
End: 2025-03-24

## 2025-03-24 DIAGNOSIS — R21 SKIN RASH: ICD-10-CM

## 2025-03-24 NOTE — TELEPHONE ENCOUNTER
Phone call from patient stating he has a few personal questions he wants to discuss with Dr Guidry and is requesting a call back. I tried scheduling an appointment and he did not want to do that.

## 2025-03-24 NOTE — TELEPHONE ENCOUNTER
Medication: nystatin (MYCOSTATIN) cream     Dose/Frequency: Apply topically as needed (as needed for rash)    Quantity: 30g    Pharmacy: [facility]    Office:   [x] PCP/Provider - Dr Guidry  [] Speciality/Provider -     Does the patient have enough for 3 days?   [] Yes   [x] No - Send as HP to POD

## 2025-03-25 RX ORDER — NYSTATIN 100000 U/G
CREAM TOPICAL AS NEEDED
Qty: 30 G | Refills: 1 | Status: SHIPPED | OUTPATIENT
Start: 2025-03-25

## 2025-03-28 ENCOUNTER — OFFICE VISIT (OUTPATIENT)
Dept: FAMILY MEDICINE CLINIC | Facility: CLINIC | Age: 60
End: 2025-03-28
Payer: MEDICARE

## 2025-03-28 ENCOUNTER — TELEPHONE (OUTPATIENT)
Dept: FAMILY MEDICINE CLINIC | Facility: CLINIC | Age: 60
End: 2025-03-28

## 2025-03-28 VITALS
WEIGHT: 153 LBS | OXYGEN SATURATION: 97 % | HEIGHT: 65 IN | SYSTOLIC BLOOD PRESSURE: 122 MMHG | HEART RATE: 93 BPM | BODY MASS INDEX: 25.49 KG/M2 | TEMPERATURE: 96.7 F | DIASTOLIC BLOOD PRESSURE: 76 MMHG

## 2025-03-28 DIAGNOSIS — Z12.11 SCREENING FOR COLON CANCER: ICD-10-CM

## 2025-03-28 DIAGNOSIS — Z12.5 SCREENING FOR PROSTATE CANCER: ICD-10-CM

## 2025-03-28 DIAGNOSIS — K21.9 GASTROESOPHAGEAL REFLUX DISEASE WITHOUT ESOPHAGITIS: ICD-10-CM

## 2025-03-28 DIAGNOSIS — N52.9 ERECTILE DYSFUNCTION, UNSPECIFIED ERECTILE DYSFUNCTION TYPE: Primary | ICD-10-CM

## 2025-03-28 DIAGNOSIS — IMO0001 SMOKING: ICD-10-CM

## 2025-03-28 DIAGNOSIS — E78.1 HYPERTRIGLYCERIDEMIA: ICD-10-CM

## 2025-03-28 DIAGNOSIS — F17.200 SMOKING: ICD-10-CM

## 2025-03-28 PROCEDURE — 99213 OFFICE O/P EST LOW 20 MIN: CPT | Performed by: FAMILY MEDICINE

## 2025-03-28 PROCEDURE — G2211 COMPLEX E/M VISIT ADD ON: HCPCS | Performed by: FAMILY MEDICINE

## 2025-03-28 RX ORDER — SILDENAFIL CITRATE 20 MG/1
20 TABLET ORAL 3 TIMES DAILY
Qty: 10 TABLET | Refills: 3 | Status: SHIPPED | OUTPATIENT
Start: 2025-03-28

## 2025-03-28 RX ORDER — OMEPRAZOLE 20 MG/1
20 CAPSULE, DELAYED RELEASE ORAL DAILY
Qty: 30 CAPSULE | Refills: 6 | Status: SHIPPED | OUTPATIENT
Start: 2025-03-28

## 2025-03-28 NOTE — PROGRESS NOTES
"Assessment/Plan:    No problem-specific Assessment & Plan notes found for this encounter.       Diagnoses and all orders for this visit:    Erectile dysfunction, unspecified erectile dysfunction type  -     sildenafil (REVATIO) 20 mg tablet; Take 1 tablet (20 mg total) by mouth 3 (three) times a day    Hypertriglyceridemia  Comments:  pt counseled on diet and exercise  Orders:  -     CBC and differential; Future  -     Comprehensive metabolic panel; Future  -     Lipid panel; Future  -     TSH, 3rd generation with Free T4 reflex; Future    Screening for colon cancer  -     Cologuard    Screening for prostate cancer  -     PSA, Total Screen; Future    Smoking  Comments:  quit smoking    Gastroesophageal reflux disease without esophagitis  -     omeprazole (PriLOSEC) 20 mg delayed release capsule; Take 1 capsule (20 mg total) by mouth daily          PHQ-2/9 Depression Screening    Little interest or pleasure in doing things: 0 - not at all  Feeling down, depressed, or hopeless: 0 - not at all  PHQ-2 Score: 0  PHQ-2 Interpretation: Negative depression screen            Subjective:      Patient ID: Glen Wong is a 59 y.o. male.    Pt complains of ED and would like viagra        The following portions of the patient's history were reviewed and updated as appropriate: allergies, current medications, past family history, past medical history, past social history, past surgical history, and problem list.    Review of Systems   Constitutional:  Negative for chills, fatigue and fever.   Respiratory:  Negative for shortness of breath and wheezing.    Cardiovascular:  Negative for chest pain and palpitations.         Objective:    /76   Pulse 93   Temp (!) 96.7 °F (35.9 °C) (Tympanic)   Ht 5' 5\" (1.651 m)   Wt 69.4 kg (153 lb)   SpO2 97%   BMI 25.46 kg/m²      Physical Exam  Vitals and nursing note reviewed.   Constitutional:       General: He is not in acute distress.     Appearance: Normal appearance. He is " not ill-appearing or diaphoretic.   HENT:      Head: Normocephalic and atraumatic.   Eyes:      Conjunctiva/sclera: Conjunctivae normal.   Cardiovascular:      Rate and Rhythm: Normal rate and regular rhythm.      Heart sounds: Normal heart sounds. No murmur heard.  Pulmonary:      Effort: Pulmonary effort is normal. No respiratory distress.      Breath sounds: Normal breath sounds. No wheezing, rhonchi or rales.   Abdominal:      General: There is no distension.      Palpations: Abdomen is soft. There is no mass.      Tenderness: There is no abdominal tenderness. There is no guarding or rebound.   Musculoskeletal:      Cervical back: Normal range of motion and neck supple. No rigidity.      Right lower leg: No edema.      Left lower leg: No edema.   Lymphadenopathy:      Cervical: No cervical adenopathy.   Neurological:      Mental Status: He is alert and oriented to person, place, and time.   Psychiatric:         Mood and Affect: Mood normal.         Behavior: Behavior normal.         Thought Content: Thought content normal.         Judgment: Judgment normal.

## 2025-03-31 NOTE — TELEPHONE ENCOUNTER
PA for sildenafil (REVATIO) 20 mg tablet SUBMITTED to     via    []CMM-KEY:   [x]Surescripts-Case ID # [id number]   []Availity-Auth ID # NDC #   []Faxed to plan   []Other website   []Phone call Case ID #     [x]PA sent as URGENT    All office notes, labs and other pertaining documents and studies sent. Clinical questions answered. Awaiting determination from insurance company.     Turnaround time for your insurance to make a decision on your Prior Authorization can take 7-21 business days.

## 2025-04-01 NOTE — TELEPHONE ENCOUNTER
PA for sildenafil (REVATIO) 20 mg tablet  DENIED    Reason:(Screenshot if applicable)        Message sent to office clinical pool Yes    Denial letter scanned into Media Yes    Appeal started No (Provider will need to decide if appeal is warranted and send clinical documentation to Prior Authorization Team for initiation.)    **Please follow up with your patient regarding denial and next steps**

## 2025-04-02 NOTE — TELEPHONE ENCOUNTER
Patient called rx refill line inquiring on if Dr. Goyal will appeal the denial.   In the mean time, patient is going to contact [facility] to see how much the medication will cost out of pocket.   Thank you.

## 2025-04-15 ENCOUNTER — APPOINTMENT (OUTPATIENT)
Dept: LAB | Facility: CLINIC | Age: 60
End: 2025-04-15
Payer: COMMERCIAL

## 2025-04-15 DIAGNOSIS — Z12.5 SCREENING FOR PROSTATE CANCER: ICD-10-CM

## 2025-04-15 DIAGNOSIS — E78.1 HYPERTRIGLYCERIDEMIA: ICD-10-CM

## 2025-04-15 LAB
ALBUMIN SERPL BCG-MCNC: 4.4 G/DL (ref 3.5–5)
ALP SERPL-CCNC: 69 U/L (ref 34–104)
ALT SERPL W P-5'-P-CCNC: 20 U/L (ref 7–52)
ANION GAP SERPL CALCULATED.3IONS-SCNC: 10 MMOL/L (ref 4–13)
AST SERPL W P-5'-P-CCNC: 16 U/L (ref 13–39)
BASOPHILS # BLD AUTO: 0.06 THOUSANDS/ÂΜL (ref 0–0.1)
BASOPHILS NFR BLD AUTO: 1 % (ref 0–1)
BILIRUB SERPL-MCNC: 0.82 MG/DL (ref 0.2–1)
BUN SERPL-MCNC: 14 MG/DL (ref 5–25)
CALCIUM SERPL-MCNC: 9.2 MG/DL (ref 8.4–10.2)
CHLORIDE SERPL-SCNC: 105 MMOL/L (ref 96–108)
CHOLEST SERPL-MCNC: 155 MG/DL (ref ?–200)
CO2 SERPL-SCNC: 23 MMOL/L (ref 21–32)
CREAT SERPL-MCNC: 0.92 MG/DL (ref 0.6–1.3)
EOSINOPHIL # BLD AUTO: 0.31 THOUSAND/ÂΜL (ref 0–0.61)
EOSINOPHIL NFR BLD AUTO: 4 % (ref 0–6)
ERYTHROCYTE [DISTWIDTH] IN BLOOD BY AUTOMATED COUNT: 12.5 % (ref 11.6–15.1)
GFR SERPL CREATININE-BSD FRML MDRD: 91 ML/MIN/1.73SQ M
GLUCOSE P FAST SERPL-MCNC: 152 MG/DL (ref 65–99)
HCT VFR BLD AUTO: 46.1 % (ref 36.5–49.3)
HDLC SERPL-MCNC: 77 MG/DL
HGB BLD-MCNC: 15.4 G/DL (ref 12–17)
IMM GRANULOCYTES # BLD AUTO: 0.02 THOUSAND/UL (ref 0–0.2)
IMM GRANULOCYTES NFR BLD AUTO: 0 % (ref 0–2)
LDLC SERPL CALC-MCNC: 52 MG/DL (ref 0–100)
LYMPHOCYTES # BLD AUTO: 2.38 THOUSANDS/ÂΜL (ref 0.6–4.47)
LYMPHOCYTES NFR BLD AUTO: 28 % (ref 14–44)
MCH RBC QN AUTO: 29.6 PG (ref 26.8–34.3)
MCHC RBC AUTO-ENTMCNC: 33.4 G/DL (ref 31.4–37.4)
MCV RBC AUTO: 89 FL (ref 82–98)
MONOCYTES # BLD AUTO: 0.71 THOUSAND/ÂΜL (ref 0.17–1.22)
MONOCYTES NFR BLD AUTO: 8 % (ref 4–12)
NEUTROPHILS # BLD AUTO: 5 THOUSANDS/ÂΜL (ref 1.85–7.62)
NEUTS SEG NFR BLD AUTO: 59 % (ref 43–75)
NONHDLC SERPL-MCNC: 78 MG/DL
NRBC BLD AUTO-RTO: 0 /100 WBCS
PLATELET # BLD AUTO: 356 THOUSANDS/UL (ref 149–390)
PMV BLD AUTO: 9 FL (ref 8.9–12.7)
POTASSIUM SERPL-SCNC: 4.2 MMOL/L (ref 3.5–5.3)
PROT SERPL-MCNC: 6.7 G/DL (ref 6.4–8.4)
PSA SERPL-MCNC: 0.94 NG/ML (ref 0–4)
RBC # BLD AUTO: 5.21 MILLION/UL (ref 3.88–5.62)
SODIUM SERPL-SCNC: 138 MMOL/L (ref 135–147)
TRIGL SERPL-MCNC: 132 MG/DL (ref ?–150)
TSH SERPL DL<=0.05 MIU/L-ACNC: 1.42 UIU/ML (ref 0.45–4.5)
WBC # BLD AUTO: 8.48 THOUSAND/UL (ref 4.31–10.16)

## 2025-04-15 PROCEDURE — 84443 ASSAY THYROID STIM HORMONE: CPT

## 2025-04-15 PROCEDURE — 80061 LIPID PANEL: CPT

## 2025-04-15 PROCEDURE — G0103 PSA SCREENING: HCPCS

## 2025-04-15 PROCEDURE — 80053 COMPREHEN METABOLIC PANEL: CPT

## 2025-04-15 PROCEDURE — 36415 COLL VENOUS BLD VENIPUNCTURE: CPT

## 2025-04-15 PROCEDURE — 85025 COMPLETE CBC W/AUTO DIFF WBC: CPT

## 2025-04-19 DIAGNOSIS — K21.9 GASTROESOPHAGEAL REFLUX DISEASE WITHOUT ESOPHAGITIS: ICD-10-CM

## 2025-04-20 RX ORDER — OMEPRAZOLE 20 MG/1
20 CAPSULE, DELAYED RELEASE ORAL DAILY
Qty: 90 CAPSULE | Refills: 1 | Status: SHIPPED | OUTPATIENT
Start: 2025-04-20

## 2025-04-23 ENCOUNTER — ANESTHESIA EVENT (OUTPATIENT)
Dept: ANESTHESIOLOGY | Facility: HOSPITAL | Age: 60
End: 2025-04-23

## 2025-04-23 ENCOUNTER — ANESTHESIA (OUTPATIENT)
Dept: ANESTHESIOLOGY | Facility: HOSPITAL | Age: 60
End: 2025-04-23

## 2025-04-24 ENCOUNTER — TELEPHONE (OUTPATIENT)
Dept: FAMILY MEDICINE CLINIC | Facility: CLINIC | Age: 60
End: 2025-04-24

## 2025-04-24 NOTE — TELEPHONE ENCOUNTER
Left voicemail for patient to come in at 10:20 to see Dr. Goyal as Deanna had an unexpected emergency today.  Asked patient to confirm by calling back.  Moved patient to the 10:20 time slot

## 2025-04-28 ENCOUNTER — TELEPHONE (OUTPATIENT)
Age: 60
End: 2025-04-28

## 2025-04-28 RX ORDER — MULTIVITAMIN
1 TABLET ORAL DAILY
COMMUNITY

## 2025-04-28 NOTE — TELEPHONE ENCOUNTER
Patient called,    Patient is having up coming cataract surgery   Right eye May 7th and left eye May 21.  Patient stated he got his days mixed up with the appointments he had scheduled.    Tried to warm transfer to office no one available to  the call.    Please reach out to the patient to schedule  Medical clearance - pre -op for May 7th    Please advise, and notify patient, thank you.     Glen- [phone]

## 2025-04-28 NOTE — PRE-PROCEDURE INSTRUCTIONS
Pre-Surgery Instructions:   Medication Instructions    ipratropium (ATROVENT) 0.03 % nasal spray Uses PRN- OK to take day of surgery    Multiple Vitamin (multivitamin) tablet Stop taking 7 days prior to surgery.    omeprazole (PriLOSEC) 20 mg delayed release capsule Uses PRN- OK to take day of surgery    sildenafil (REVATIO) 20 mg tablet Uses PRN- DO NOT take day of surgery     Spoke with pt via phone.    Medication instructions for day of surgery reviewed. Please take all instructed medications with only a sip of water.       You will receive a call one business day prior to surgery with an arrival time and hospital directions. If your surgery is scheduled on a Monday, the hospital will be calling you on the Friday prior to your surgery. If you have not heard from anyone by 8pm, please call the hospital supervisor through the hospital  at [phone]. ([place] [phone] or [place] [phone]).    Do not eat or drink anything after midnight the night before your surgery, including candy, mints, lifesavers, or chewing gum. Do not drink alcohol 24hrs before your surgery. Try not to smoke at least 24hrs before your surgery.       Follow the pre surgery showering instructions as listed in the “My Surgical Experience Booklet” or otherwise provided by your surgeon's office. Do not use a blade to shave the surgical area 1 week before surgery. It is okay to use a clean electric clippers up to 24 hours before surgery. Do not apply any lotions, creams, including makeup, cologne, deodorant, or perfumes after showering on the day of your surgery. Do not use dry shampoo, hair spray, hair gel, or any type of hair products.     No contact lenses, eye make-up, or artificial eyelashes. Remove nail polish, including gel polish, and any artificial, gel, or acrylic nails if possible. Remove all jewelry including rings and body piercing jewelry.     Wear causal clothing that is easy to take on and off. Consider your type  of surgery.    Keep any valuables, jewelry, piercings at home. Please bring any specially ordered equipment (sling, braces) if indicated.    Arrange for a responsible person to drive you to and from the hospital on the day of your surgery. Please confirm the visitor policy for the day of your procedure when you receive your phone call with an arrival time.     Call the surgeon's office with any new illnesses, exposures, or additional questions prior to surgery.    Please reference your “My Surgical Experience Booklet” for additional information to prepare for your upcoming surgery.

## 2025-05-01 ENCOUNTER — CONSULT (OUTPATIENT)
Dept: FAMILY MEDICINE CLINIC | Facility: CLINIC | Age: 60
End: 2025-05-01
Payer: COMMERCIAL

## 2025-05-01 VITALS
SYSTOLIC BLOOD PRESSURE: 120 MMHG | TEMPERATURE: 95.7 F | OXYGEN SATURATION: 98 % | HEIGHT: 65 IN | DIASTOLIC BLOOD PRESSURE: 78 MMHG | HEART RATE: 78 BPM | WEIGHT: 152 LBS | BODY MASS INDEX: 25.33 KG/M2

## 2025-05-01 DIAGNOSIS — Z12.11 SCREENING FOR COLON CANCER: ICD-10-CM

## 2025-05-01 DIAGNOSIS — H25.012 CORTICAL AGE-RELATED CATARACT OF LEFT EYE: Primary | ICD-10-CM

## 2025-05-01 PROCEDURE — G2211 COMPLEX E/M VISIT ADD ON: HCPCS | Performed by: FAMILY MEDICINE

## 2025-05-01 PROCEDURE — 99213 OFFICE O/P EST LOW 20 MIN: CPT | Performed by: FAMILY MEDICINE

## 2025-05-01 NOTE — PROGRESS NOTES
"Assessment/Plan:    No problem-specific Assessment & Plan notes found for this encounter.       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 male.    Pt here for cardiovascular evaluation for left eye cataract surgery        The following portions of the patient's history were reviewed and updated as appropriate: allergies, current medications, past family history, past medical history, past social history, past surgical history, and problem list.    Review of Systems   Constitutional: Negative.  Negative for chills, fatigue and fever.   HENT: Negative.  Negative for hearing loss.    Eyes:  Positive for visual disturbance.   Respiratory:  Negative for shortness of breath and wheezing.    Cardiovascular:  Negative for chest pain and palpitations.   Gastrointestinal:  Negative for abdominal pain, blood in stool, constipation, diarrhea, nausea and vomiting.   Endocrine: Negative.    Genitourinary:  Negative for difficulty urinating and dysuria.   Musculoskeletal:  Negative for arthralgias and myalgias.   Skin: Negative.    Allergic/Immunologic: Negative.    Neurological:  Negative for seizures and syncope.   Hematological:  Negative for adenopathy.   Psychiatric/Behavioral: Negative.           Objective:    /78   Pulse 78   Temp (!) 95.7 °F (35.4 °C)   Ht 5' 5\" (1.651 m)   Wt 68.9 kg (152 lb)   SpO2 98%   BMI 25.29 kg/m²      Physical Exam  Vitals and nursing note reviewed.   Constitutional:       General: He is not in acute distress.     Appearance: Normal appearance. He is " well-developed. He is not ill-appearing, toxic-appearing or diaphoretic.   HENT:      Head: Normocephalic and atraumatic.      Right Ear: Tympanic membrane, ear canal and external ear normal. There is no impacted cerumen.      Left Ear: Tympanic membrane, ear canal and external ear normal. There is no impacted cerumen.      Nose: Nose normal. No congestion or rhinorrhea.      Mouth/Throat:      Mouth: Mucous membranes are moist.      Pharynx: Oropharynx is clear. No oropharyngeal exudate or posterior oropharyngeal erythema.   Eyes:      General: No scleral icterus.        Right eye: No discharge.         Left eye: No discharge.      Extraocular Movements: Extraocular movements intact.      Conjunctiva/sclera: Conjunctivae normal.      Pupils: Pupils are equal, round, and reactive to light.   Cardiovascular:      Rate and Rhythm: Normal rate and regular rhythm.      Pulses: Normal pulses.      Heart sounds: Normal heart sounds. No murmur heard.     No friction rub. No gallop.   Pulmonary:      Effort: Pulmonary effort is normal. No respiratory distress.      Breath sounds: Normal breath sounds. No wheezing, rhonchi or rales.   Abdominal:      General: Bowel sounds are normal. There is no distension.      Palpations: Abdomen is soft. There is no mass.      Tenderness: There is no abdominal tenderness. There is no guarding or rebound.   Musculoskeletal:         General: No swelling or deformity. Normal range of motion.      Cervical back: Normal range of motion and neck supple. No rigidity.      Right lower leg: No edema.      Left lower leg: No edema.   Lymphadenopathy:      Cervical: No cervical adenopathy.   Skin:     General: Skin is warm and dry.      Findings: No rash.   Neurological:      General: No focal deficit present.      Mental Status: He is alert and oriented to person, place, and time.      Sensory: No sensory deficit.      Motor: No weakness.      Coordination: Coordination normal.      Gait: Gait  normal.      Deep Tendon Reflexes: Reflexes are normal and symmetric. Reflexes normal.   Psychiatric:         Mood and Affect: Mood normal.         Behavior: Behavior normal.         Thought Content: Thought content normal.         Judgment: Judgment normal.

## 2025-05-04 ENCOUNTER — PREP FOR PROCEDURE (OUTPATIENT)
Age: 60
End: 2025-05-04

## 2025-05-04 RX ORDER — SODIUM CHLORIDE, SODIUM LACTATE, POTASSIUM CHLORIDE, CALCIUM CHLORIDE 600; 310; 30; 20 MG/100ML; MG/100ML; MG/100ML; MG/100ML
20 INJECTION, SOLUTION INTRAVENOUS CONTINUOUS
Status: CANCELLED | OUTPATIENT
Start: 2025-05-04

## 2025-05-04 RX ORDER — PROPARACAINE HYDROCHLORIDE 5 MG/ML
1 SOLUTION/ DROPS OPHTHALMIC ONCE
Status: CANCELLED | OUTPATIENT
Start: 2025-05-04 | End: 2025-05-04

## 2025-05-04 NOTE — H&P
S:    -60yo male with progressive decline in quality of vision os>od     PMH:  -none    Meds:  -atrovent    O: See Ophth encounter with me dated 3-13-25 for full ophth exam        PHYSICAL EXAMINATION:      Head:  normocephalic / atraumatic ; cataract os>od     Ears:  hearing intact to voice      Nose:  nares clear      Throat:  throat normal      Respiratory:  CTA      Neck:  clear      Cardiovascular:  NSR      Abdomen:  soft      Extremities:  negative edema      Neurologic:  grossly intact      Mental Status/Communication:  within range of normal variation      Physical Disabilities: none            IMPRESSION:      Combined senile Cataract os>od           PLAN:        Ce/iol os - 5-7-25

## 2025-05-06 ENCOUNTER — ANESTHESIA EVENT (OUTPATIENT)
Dept: PERIOP | Facility: AMBULARY SURGERY CENTER | Age: 60
End: 2025-05-06
Payer: COMMERCIAL

## 2025-05-06 NOTE — ANESTHESIA PREPROCEDURE EVALUATION
Procedure:  EXTRACTION EXTRACAPSULAR CATARACT PHACO INTRAOCULAR LENS (IOL) (Left: Eye)    Relevant Problems   ANESTHESIA (within normal limits)      MUSCULOSKELETAL   (+) Back pain      NEURO/PSYCH   (+) Anxiety        Physical Exam    Airway    Mallampati score: III  TM Distance: <3 FB  Neck ROM: full     Dental   Comment: Denies loose teeth     Cardiovascular      Pulmonary      Other Findings        Anesthesia Plan  ASA Score- 2     Anesthesia Type- IV sedation with anesthesia with ASA Monitors.         Additional Monitors:     Airway Plan:            Plan Factors-Exercise tolerance (METS): >4 METS.    Chart reviewed. EKG reviewed.  Existing labs reviewed. Patient summary reviewed.    Patient is a current smoker.              Induction- intravenous.    Postoperative Plan-         Informed Consent- Anesthetic plan and risks discussed with patient.  I personally reviewed this patient with the CRNA. Discussed and agreed on the Anesthesia Plan with the CRNA..      NPO Status:  No vitals data found for the desired time range.

## 2025-05-07 ENCOUNTER — ANESTHESIA EVENT (OUTPATIENT)
Dept: PERIOP | Facility: AMBULARY SURGERY CENTER | Age: 60
End: 2025-05-07
Payer: COMMERCIAL

## 2025-05-07 ENCOUNTER — HOSPITAL ENCOUNTER (OUTPATIENT)
Facility: AMBULARY SURGERY CENTER | Age: 60
Setting detail: OUTPATIENT SURGERY
Discharge: HOME | End: 2025-05-07
Attending: OPHTHALMOLOGY | Admitting: OPHTHALMOLOGY
Payer: COMMERCIAL

## 2025-05-07 ENCOUNTER — ANESTHESIA (OUTPATIENT)
Dept: PERIOP | Facility: AMBULARY SURGERY CENTER | Age: 60
End: 2025-05-07
Payer: COMMERCIAL

## 2025-05-07 VITALS
DIASTOLIC BLOOD PRESSURE: 71 MMHG | RESPIRATION RATE: 20 BRPM | BODY MASS INDEX: 27.29 KG/M2 | HEART RATE: 75 BPM | WEIGHT: 154 LBS | SYSTOLIC BLOOD PRESSURE: 128 MMHG | HEIGHT: 63 IN | OXYGEN SATURATION: 99 % | TEMPERATURE: 97.3 F

## 2025-05-07 DIAGNOSIS — N52.9 ERECTILE DYSFUNCTION, UNSPECIFIED ERECTILE DYSFUNCTION TYPE: ICD-10-CM

## 2025-05-07 DIAGNOSIS — H25.813 COMBINED FORM OF SENILE CATARACT OF BOTH EYES: Primary | ICD-10-CM

## 2025-05-07 PROCEDURE — NC001 PR NO CHARGE: Performed by: OPHTHALMOLOGY

## 2025-05-07 PROCEDURE — V2632 POST CHMBR INTRAOCULAR LENS: HCPCS | Performed by: OPHTHALMOLOGY

## 2025-05-07 PROCEDURE — 66984 XCAPSL CTRC RMVL W/O ECP: CPT | Performed by: OPHTHALMOLOGY

## 2025-05-07 DEVICE — CLAREON ASPHERIC HYDROPHOBIC ACRYLIC IOL WITH THE AUTONOMEAUTOMATED PRE-LOADED DELIVERY SYSTEM
Type: IMPLANTABLE DEVICE | Site: EYE | Status: FUNCTIONAL
Brand: CLAREON™

## 2025-05-07 RX ORDER — SODIUM CHLORIDE, SODIUM LACTATE, POTASSIUM CHLORIDE, CALCIUM CHLORIDE 600; 310; 30; 20 MG/100ML; MG/100ML; MG/100ML; MG/100ML
INJECTION, SOLUTION INTRAVENOUS CONTINUOUS PRN
Status: DISCONTINUED | OUTPATIENT
Start: 2025-05-07 | End: 2025-05-07

## 2025-05-07 RX ORDER — SILDENAFIL CITRATE 20 MG/1
20 TABLET ORAL 3 TIMES DAILY
Qty: 10 TABLET | Refills: 3 | Status: SHIPPED | OUTPATIENT
Start: 2025-05-07

## 2025-05-07 RX ORDER — PROPARACAINE HYDROCHLORIDE 5 MG/ML
1 SOLUTION/ DROPS OPHTHALMIC ONCE
Status: COMPLETED | OUTPATIENT
Start: 2025-05-07 | End: 2025-05-07

## 2025-05-07 RX ORDER — MIDAZOLAM HYDROCHLORIDE 2 MG/2ML
INJECTION, SOLUTION INTRAMUSCULAR; INTRAVENOUS AS NEEDED
Status: DISCONTINUED | OUTPATIENT
Start: 2025-05-07 | End: 2025-05-07

## 2025-05-07 RX ORDER — FENTANYL CITRATE/PF 50 MCG/ML
25 SYRINGE (ML) INJECTION
Status: DISCONTINUED | OUTPATIENT
Start: 2025-05-07 | End: 2025-05-07 | Stop reason: HOSPADM

## 2025-05-07 RX ORDER — LIDOCAINE HYDROCHLORIDE 10 MG/ML
INJECTION, SOLUTION EPIDURAL; INFILTRATION; INTRACAUDAL; PERINEURAL AS NEEDED
Status: DISCONTINUED | OUTPATIENT
Start: 2025-05-07 | End: 2025-05-07 | Stop reason: HOSPADM

## 2025-05-07 RX ORDER — DIPHENHYDRAMINE HYDROCHLORIDE 50 MG/ML
12.5 INJECTION, SOLUTION INTRAMUSCULAR; INTRAVENOUS ONCE AS NEEDED
Status: DISCONTINUED | OUTPATIENT
Start: 2025-05-07 | End: 2025-05-07 | Stop reason: HOSPADM

## 2025-05-07 RX ORDER — DEXAMETHASONE SODIUM PHOSPHATE 10 MG/ML
INJECTION, SOLUTION INTRAMUSCULAR; INTRAVENOUS AS NEEDED
Status: DISCONTINUED | OUTPATIENT
Start: 2025-05-07 | End: 2025-05-07 | Stop reason: HOSPADM

## 2025-05-07 RX ORDER — TETRACAINE HYDROCHLORIDE 5 MG/ML
SOLUTION OPHTHALMIC AS NEEDED
Status: DISCONTINUED | OUTPATIENT
Start: 2025-05-07 | End: 2025-05-07 | Stop reason: HOSPADM

## 2025-05-07 RX ORDER — MOXIFLOXACIN 5 MG/ML
SOLUTION/ DROPS OPHTHALMIC AS NEEDED
Status: DISCONTINUED | OUTPATIENT
Start: 2025-05-07 | End: 2025-05-07 | Stop reason: HOSPADM

## 2025-05-07 RX ORDER — PROPARACAINE HYDROCHLORIDE 5 MG/ML
SOLUTION/ DROPS OPHTHALMIC AS NEEDED
Status: DISCONTINUED | OUTPATIENT
Start: 2025-05-07 | End: 2025-05-07 | Stop reason: HOSPADM

## 2025-05-07 RX ORDER — FENTANYL CITRATE 50 UG/ML
INJECTION, SOLUTION INTRAMUSCULAR; INTRAVENOUS AS NEEDED
Status: DISCONTINUED | OUTPATIENT
Start: 2025-05-07 | End: 2025-05-07

## 2025-05-07 RX ORDER — ACETAMINOPHEN 325 MG/1
650 TABLET ORAL EVERY 4 HOURS PRN
Status: DISCONTINUED | OUTPATIENT
Start: 2025-05-07 | End: 2025-05-07 | Stop reason: HOSPADM

## 2025-05-07 RX ORDER — HYDROMORPHONE HCL/PF 1 MG/ML
0.5 SYRINGE (ML) INJECTION
Status: DISCONTINUED | OUTPATIENT
Start: 2025-05-07 | End: 2025-05-07 | Stop reason: HOSPADM

## 2025-05-07 RX ORDER — TROPICAMIDE 10 MG/ML
1 SOLUTION/ DROPS OPHTHALMIC
Status: COMPLETED | OUTPATIENT
Start: 2025-05-07 | End: 2025-05-07

## 2025-05-07 RX ORDER — PREDNISOLONE ACETATE 10 MG/ML
SUSPENSION/ DROPS OPHTHALMIC
Qty: 5 ML | Refills: 12 | Status: SHIPPED | OUTPATIENT
Start: 2025-05-07 | End: 2025-05-16 | Stop reason: SDUPTHER

## 2025-05-07 RX ORDER — ONDANSETRON 2 MG/ML
4 INJECTION INTRAMUSCULAR; INTRAVENOUS ONCE AS NEEDED
Status: DISCONTINUED | OUTPATIENT
Start: 2025-05-07 | End: 2025-05-07 | Stop reason: HOSPADM

## 2025-05-07 RX ORDER — BALANCED SALT SOLUTION 6.4; .75; .48; .3; 3.9; 1.7 MG/ML; MG/ML; MG/ML; MG/ML; MG/ML; MG/ML
SOLUTION OPHTHALMIC AS NEEDED
Status: DISCONTINUED | OUTPATIENT
Start: 2025-05-07 | End: 2025-05-07 | Stop reason: HOSPADM

## 2025-05-07 RX ORDER — PHENYLEPHRINE HYDROCHLORIDE 25 MG/ML
1 SOLUTION/ DROPS OPHTHALMIC
Status: COMPLETED | OUTPATIENT
Start: 2025-05-07 | End: 2025-05-07

## 2025-05-07 RX ORDER — POVIDONE-IODINE 5 %
SOLUTION, NON-ORAL OPHTHALMIC (EYE) AS NEEDED
Status: DISCONTINUED | OUTPATIENT
Start: 2025-05-07 | End: 2025-05-07 | Stop reason: HOSPADM

## 2025-05-07 RX ORDER — SODIUM CHLORIDE, SODIUM LACTATE, POTASSIUM CHLORIDE, CALCIUM CHLORIDE 600; 310; 30; 20 MG/100ML; MG/100ML; MG/100ML; MG/100ML
20 INJECTION, SOLUTION INTRAVENOUS CONTINUOUS
Status: DISCONTINUED | OUTPATIENT
Start: 2025-05-07 | End: 2025-05-07 | Stop reason: HOSPADM

## 2025-05-07 RX ORDER — CYCLOPENTOLATE HYDROCHLORIDE 10 MG/ML
1 SOLUTION/ DROPS OPHTHALMIC
Status: COMPLETED | OUTPATIENT
Start: 2025-05-07 | End: 2025-05-07

## 2025-05-07 RX ADMIN — CYCLOPENTOLATE HYDROCHLORIDE 1 DROP: 10 SOLUTION OPHTHALMIC at 06:58

## 2025-05-07 RX ADMIN — PHENYLEPHRINE HYDROCHLORIDE 1 DROP: 25 SOLUTION/ DROPS OPHTHALMIC at 07:07

## 2025-05-07 RX ADMIN — TROPICAMIDE 1 DROP: 10 SOLUTION/ DROPS OPHTHALMIC at 07:07

## 2025-05-07 RX ADMIN — SODIUM CHLORIDE, SODIUM LACTATE, POTASSIUM CHLORIDE, AND CALCIUM CHLORIDE: .6; .31; .03; .02 INJECTION, SOLUTION INTRAVENOUS at 07:30

## 2025-05-07 RX ADMIN — TROPICAMIDE 1 DROP: 10 SOLUTION/ DROPS OPHTHALMIC at 07:15

## 2025-05-07 RX ADMIN — CYCLOPENTOLATE HYDROCHLORIDE 1 DROP: 10 SOLUTION OPHTHALMIC at 07:14

## 2025-05-07 RX ADMIN — FENTANYL CITRATE 25 MCG: 50 INJECTION INTRAMUSCULAR; INTRAVENOUS at 07:50

## 2025-05-07 RX ADMIN — FENTANYL CITRATE 25 MCG: 50 INJECTION INTRAMUSCULAR; INTRAVENOUS at 07:45

## 2025-05-07 RX ADMIN — FENTANYL CITRATE 25 MCG: 50 INJECTION INTRAMUSCULAR; INTRAVENOUS at 08:11

## 2025-05-07 RX ADMIN — CYCLOPENTOLATE HYDROCHLORIDE 1 DROP: 10 SOLUTION OPHTHALMIC at 07:06

## 2025-05-07 RX ADMIN — PHENYLEPHRINE HYDROCHLORIDE 1 DROP: 25 SOLUTION/ DROPS OPHTHALMIC at 07:14

## 2025-05-07 RX ADMIN — TROPICAMIDE 1 DROP: 10 SOLUTION/ DROPS OPHTHALMIC at 07:00

## 2025-05-07 RX ADMIN — PROPARACAINE HYDROCHLORIDE 1 DROP: 5 SOLUTION/ DROPS OPHTHALMIC at 06:53

## 2025-05-07 RX ADMIN — PHENYLEPHRINE HYDROCHLORIDE 1 DROP: 25 SOLUTION/ DROPS OPHTHALMIC at 06:59

## 2025-05-07 RX ADMIN — MIDAZOLAM HYDROCHLORIDE 2 MG: 1 INJECTION, SOLUTION INTRAMUSCULAR; INTRAVENOUS at 07:30

## 2025-05-07 RX ADMIN — FENTANYL CITRATE 25 MCG: 50 INJECTION INTRAMUSCULAR; INTRAVENOUS at 08:24

## 2025-05-07 NOTE — OP NOTE
OPERATIVE REPORT  PATIENT NAME: Glen Wong    :  1965  MRN: [id number]  Pt Location: [facility] OR ROOM 01    SURGERY DATE: 2025    Surgeons and Role:     * DO Annette Sifuentes Primary    Preop Diagnosis:  Combined forms of age-related cataract of left eye [H25.812]    Post-Op Diagnosis Codes:     * Combined forms of age-related cataract of left eye [H25.812]    Procedure(s):  Left - EXTRACTION EXTRACAPSULAR CATARACT PHACO INTRAOCULAR LENS (IOL)    Specimen(s):  * No specimens in log *    Estimated Blood Loss:   Minimal    Drains:  * No LDAs found *    Anesthesia Type:   IV Sedation with Anesthesia    Operative Indications:  Combined forms of age-related cataract of left eye [H25.812]      Operative Findings:  Combined forms of age-related cataract of left eye [H25.812]      Complications:   None    Procedure and Technique:  Phaco/pciol os   I was present for the entire procedure.    Patient Disposition:  PACU     OPERATIVE REPORT         Name: Glen Wong     MRN: [id number]  Date: 2025 Time: 8:33 AM   _______________________________________________________________________      Location:  [facility]  Service:    Ophthalmology      Date of Operation: 2025      Pre-op Diagnosis: Combined Senile Cataract of the left eye      Post-op Diagnosis: Combined Senile Cataract of the left eye      Surgeon: Idris Esteban     Assistants: None      Anesthesia: Local anesthesia with IV sedation and monitored anesthesia care     Operation: Phacoemulsification cataract extraction with PCIOL implantation, OS      Findings: Cataract OS  Specimens and Disposition: None  Complications:  None   Indications and History:   Decreased vision due to Combined forms of age related cataract left eye     Description of Operation:      The patient was brought to the preoperative holding area where the operative eye was confirmed both verbally and on the consent form. A venkat was placed on the skin above the left  eye to designate that eye as the correct surgical site. An IV line was placed by the anesthesia staff. Pharmacologic dilating drops were applied to the eye.  The patient was brought to the operating room where the anesthesia team established cardiac monitoring and light IV sedation was administered.  The patient was positioned comfortably supine on an eye bed.  A surgical time-out was performed.   Two drops of Proparacaine were then applied and the eye was then prepped and draped in the usual sterile fashion for intraocular surgery.  A second time-out was performed, confirming the patient’s identity by name, and date of birth and confirming the correct eye and procedure to be performed. We confirmed that the appropriate IOL implants were present in the room.   An eye lid speculum was then applied for exposure of the left globe.       Sterile preservative-free tetracaine was applied to the globe. A paracentesis port was created with a sideport blade at the 1730 limbus.   A small amount of  preservative-free 1% lidocaine  was injected into the anterior chamber.  The anterior chamber was then gently filled with Viscoat. A temporal clear cornea incision was then created with a 2.4 mm keratome in a tunneled fashion.  A continuous curvilinear capsulorrhexis (CCC) was initiated with a cystitome and completed with Utrata forceps.  The CCC was nicely centered on the anterior capsule and measured approximately 5.0mm.  Copious but gentle hydrodissection was performed, allowing the nucleus to rotate freely within the capsular bag.   Additional Viscoat was placed in the anterior chamber.  The nucleus was disassembled using a stop and chop technique and the nuclear quadrants  were emulsifed without incident.  The anterior chamber was kept formed during instrument exchange by injecting Provisc into the anterior chamber.  The residual cortical material was then removed with the irrigation and aspiration handpiece.   Provisc was  injected into the capsular bag and anterior chamber to add adequate volume to each.   The undersurface of the anterior capsule was polished using a rosado sweep for 360 degrees.  An intraocular lens from [employer], model CNA0T0, 20.5 diopters in power, selected after review and interpretation of IOL measurements, was then injected into the capsular bag through the clear corneal incision using the Autonome injector.  The IOL centered nicely within the capsular bag. The residual viscoelastic material was evacuated from the capsular bag and anterior chamber via the irrigation and aspiration handpiece.    Gentle stromal hydration was then performed at each wound site.          The anterior chamber was well-formed and maintained and the IOP was palpated and found to be at approximately 20 mmHg.         All incisions were dried and virgil-tested negative.   The anterior chamber remained well-formed and well-maintained with the IOL very nicely centered within the capsular bag, with approximately 1.0 mm of overlap of the anterior capsular leaflet to the peripheral aspect of the IOL optic for 360 degrees.         An inferonasal subconjunctival injection of Dexamethasone 2mg was then delivered. Two drops of moxifloxacin were applied to the globe.      The eyelid speculum and drapes were carefully removed and the periorbital area was wiped with a sterile, moist sponge followed by a sterile, dry sponge.         The eye was then dressed with  two drops of Moxifloxacin and a casas shield was taped in place over the globe.   The patient tolerated the procedure well and was taken to the recovery room in excellent condition.  I discussed proper care of the eye following surgery.  Typed instructions were provided as well. The patient was provided my cell phone number to contact me should any questions or problems arise and a follow-up appointment was set for the following morning.                           SIGNATURE: Idris Esteban,  DO  DATE: May 7, 2025  TIME: 8:32 AM

## 2025-05-07 NOTE — ANESTHESIA POSTPROCEDURE EVALUATION
Post-Op Assessment Note    CV Status:  Stable  Pain Score: 0    Pain management: adequate       Mental Status:  Alert and awake   Hydration Status:  Euvolemic   PONV Controlled:  Controlled   Airway Patency:  Patent     Post Op Vitals Reviewed: Yes    No anethesia notable event occurred.    Staff: CRNA           Last Filed PACU Vitals:  Vitals Value Taken Time   Temp     Pulse 71 05/07/25 0833   /90 05/07/25 0833   Resp 11 05/07/25 0833   SpO2 98 % 05/07/25 0833   Vitals shown include unfiled device data.

## 2025-05-08 ENCOUNTER — OFFICE VISIT (OUTPATIENT)
Age: 60
End: 2025-05-08

## 2025-05-08 DIAGNOSIS — Z09 POSTOP CHECK: Primary | ICD-10-CM

## 2025-05-08 PROCEDURE — 99024 POSTOP FOLLOW-UP VISIT: CPT | Performed by: OPHTHALMOLOGY

## 2025-05-08 NOTE — PROGRESS NOTES
Name: Glen Wong      : 1965      MRN: [id number]  Encounter Provider: Idris Esteban DO  Encounter Date: 2025   Encounter department: [facility] OPHTHALMOLOGY  :  Assessment & Plan  Postop check  Pod1 s/p [id number]  All looks good today    Plan:  Moxi os q4h wa  Pred os q4h wa-virgen well  Ma shield os qhs  -medication instruction sheet reviewed with and provided to pt    F/u 1 wk or sooner prn - check autorx/k   Return in about 1 week (around 5/15/2025) for postop wk 1 - OS.                 Glen Wong is a 59 y.o. male who presents Today for Post op. Pt reports visual improvement, he reports irritation OS  History obtained from: patient    Review of Systems  Medical History Reviewed by provider this encounter:       HPI     Post-op     Additional comments: Today for Post op. Pt reports visual improvement, he reports irritation OS           Comments    Pt presents for Pod1 s/p [id number]  All seems ok    Meds:  Moxi os q4h wa  Pred os q4h wa  Ma shield os qhs              Last edited by Idris Esteban DO on 2025 10:04 AM.             Past Ocular History: PCIOL OS  Ocular Meds/Drops:   Moxifloxacin Q4h OS- today at 8am  Prednisolone Q4H OD- today at 8am    Base Eye Exam     Visual Acuity (Snellen - Linear)       Right Left    Dist sc  20/20          Tonometry (gat, 10:12 AM)       Right Left    Pressure  24          Pupils       Dark APD    Right 3 -    Left 3 -          Extraocular Movement       Right Left     Full, Ortho Full, Ortho          Neuro/Psych     Oriented x3: Yes            Slit Lamp and Fundus Exam     External Exam       Right Left    External  Normal          Slit Lamp Exam       Right Left    Lids/Lashes  Normal    Conjunctiva/Sclera  White and quiet    Cornea  mechelle neg; tr dmf    Anterior Chamber  +2 cell    Iris  Round and reactive    Lens  PCIOL - clear    Anterior Vitreous  clear          Fundus Exam       Right Left    Disc  sharp, no pallor     C/D Ratio  0.0                  IMAGING:

## 2025-05-14 ENCOUNTER — TELEPHONE (OUTPATIENT)
Dept: FAMILY MEDICINE CLINIC | Facility: CLINIC | Age: 60
End: 2025-05-14

## 2025-05-14 NOTE — TELEPHONE ENCOUNTER
Pt called refill line and is asking if the sildenafil (REVATIO) 20 mg tablet comes in a higher dose like 30 mg's and if he can get a higher quantity than 10 so he doesn't have to go to the pharmacy every 4 days. Please call the pt back regarding this.

## 2025-05-16 ENCOUNTER — OFFICE VISIT (OUTPATIENT)
Age: 60
End: 2025-05-16
Payer: COMMERCIAL

## 2025-05-16 DIAGNOSIS — H25.811 COMBINED FORM OF SENILE CATARACT OF RIGHT EYE: ICD-10-CM

## 2025-05-16 DIAGNOSIS — H25.813 COMBINED FORM OF SENILE CATARACT OF BOTH EYES: ICD-10-CM

## 2025-05-16 DIAGNOSIS — Z09 POSTOP CHECK: Primary | ICD-10-CM

## 2025-05-16 PROCEDURE — 99214 OFFICE O/P EST MOD 30 MIN: CPT | Performed by: OPHTHALMOLOGY

## 2025-05-16 RX ORDER — MOXIFLOXACIN 5 MG/ML
SOLUTION/ DROPS OPHTHALMIC
Qty: 3 ML | Refills: 2 | Status: SHIPPED | OUTPATIENT
Start: 2025-05-16 | End: 2025-05-23 | Stop reason: SDUPTHER

## 2025-05-16 RX ORDER — PREDNISOLONE ACETATE 10 MG/ML
SUSPENSION/ DROPS OPHTHALMIC
Qty: 5 ML | Refills: 6 | Status: SHIPPED | OUTPATIENT
Start: 2025-05-16 | End: 2025-05-23 | Stop reason: SDUPTHER

## 2025-05-16 NOTE — PROGRESS NOTES
Name: Glen Wong      : 1965      MRN: [id number]  Encounter Provider: Idris Esteban DO  Encounter Date: 2025   Encounter department: [facility] OPHTHALMOLOGY  :  Assessment & Plan  Postop check  Postop check  Pod8 s/p [id number]  -has +2 ac rxn (fine, mostly pigm cell) - asymp     Plan:  Moxi os q4h wa - can stop  Pred os:  increase to 6 times per day (shake well)  Ma shield os qhs- can stop  -medication instruction sheet reviewed with and provided to pt     F/u 1 wk or sooner prn - check autorx/k        Combined form of senile cataract of right eye  Patient with visually significant cataract of the RIGHT eye that is interfering with activities of daily living.  Reviewed the risks, benefits, and alternatives to cataract surgery of the RIGHTeye.  After discussing with the patient, the patient voiced a desire to proceed with cataract surgery of the RIGHT eye.  We reviewed IOL options and patient desires a monofocal IOL set for distance vision.  Patient understands the need for reading glasses following this procedure and the possible need for distance glasses following this procedure.          Combined form of senile cataract of both eyes      Glen Wong is a 59 y.o. male who presents for 1 week POV s/p Phaco/PCIOL OS.    Vision improving.  Denies pain.  Using drops as directed.    History obtained from: patient    Review of Systems  Medical History Reviewed by provider this encounter:   HPI     Post-op     Additional comments: 1 week POV s/p Phaco/PCIOL OS  - vision improved  - using drops           Comments    Ocular Meds/Drops:   Moxifloxacin q4hWA OS - ld this AM  Prednisolone q4hWA OS - ld this AM  Shield QHS - did not wear last night  ----------------------------------------------  Postop check  Pod8 s/p [id number]  Va os doing well  Va od with stable blur     Oc med: OS  Moxi os q4h wa  Pred os q4h wa-shake well  Ma shield os qhs            Last edited by Idris  Joseph LouisaharleenDO on 5/16/2025 10:37 AM.                 Ocular Meds/Drops:   Moxifloxacin q4hWA OS - ld this AM  Prednisolone q4hWA OS - ld this AM  Shield QHS - did not wear last night    Base Eye Exam     Visual Acuity (Snellen - Linear)       Right Left    Dist sc 20/50 20/20    Dist ph sc 20/50           Tonometry (Applanation, 10:41 AM)       Right Left    Pressure  15          Neuro/Psych     Oriented x3: Yes            Additional Tests     Keratometry       K1 Axis K2 Axis    Right        Left 41.50 165 42.25 75            Slit Lamp and Fundus Exam     External Exam       Right Left    External  Normal          Slit Lamp Exam       Right Left    Lids/Lashes Normal Normal    Conjunctiva/Sclera White and quiet White and quiet    Cornea Clear tr dmf; +1 endo pigm    Anterior Chamber Deep and quiet +2 cell (fine, pigm)    Iris Round and reactive Round and reactive/no tid    Lens +2nsc, +1psc (central) PCIOL - clear    Anterior Vitreous  clear          Fundus Exam       Right Left    Disc  sharp, no pallor    C/D Ratio  0.0            Refraction     Manifest Refraction (Auto)       Sphere Cylinder Axis    Right       Left +0.50 -0.50 122                  IMAGING:

## 2025-05-17 RX ORDER — TROPICAMIDE 10 MG/ML
1 SOLUTION/ DROPS OPHTHALMIC
Status: CANCELLED | OUTPATIENT
Start: 2025-05-17 | End: 2025-05-17

## 2025-05-17 RX ORDER — PHENYLEPHRINE HYDROCHLORIDE 25 MG/ML
1 SOLUTION/ DROPS OPHTHALMIC
Status: CANCELLED | OUTPATIENT
Start: 2025-05-17 | End: 2025-05-17

## 2025-05-17 RX ORDER — SODIUM CHLORIDE, SODIUM LACTATE, POTASSIUM CHLORIDE, CALCIUM CHLORIDE 600; 310; 30; 20 MG/100ML; MG/100ML; MG/100ML; MG/100ML
20 INJECTION, SOLUTION INTRAVENOUS CONTINUOUS
Status: CANCELLED | OUTPATIENT
Start: 2025-05-17

## 2025-05-17 RX ORDER — PROPARACAINE HYDROCHLORIDE 5 MG/ML
1 SOLUTION/ DROPS OPHTHALMIC ONCE
Status: CANCELLED | OUTPATIENT
Start: 2025-05-17 | End: 2025-05-17

## 2025-05-17 RX ORDER — CYCLOPENTOLATE HYDROCHLORIDE 10 MG/ML
1 SOLUTION/ DROPS OPHTHALMIC
Status: CANCELLED | OUTPATIENT
Start: 2025-05-17 | End: 2025-05-17

## 2025-05-17 NOTE — H&P
S:    -58yo male with progressive decline in quality of vision OD     PMH:  -CATARACT SURGERY OS    Meds:  -atrovent    O: See Ophth encounter with me dated 3-13-25 for full ophth exam        PHYSICAL EXAMINATION:      Head:  normocephalic / atraumatic ; cataract OD     Ears:  hearing intact to voice      Nose:  nares clear      Throat:  throat normal      Respiratory:  CTA      Neck:  clear      Cardiovascular:  NSR      Abdomen:  soft      Extremities:  negative edema      Neurologic:  grossly intact      Mental Status/Communication:  within range of normal variation      Physical Disabilities: none            IMPRESSION:      Combined senile Cataract OD           PLAN:        Ce/iol OD - 5-21-25

## 2025-05-20 NOTE — ANESTHESIA PREPROCEDURE EVALUATION
Procedure:  EXTRACTION EXTRACAPSULAR CATARACT PHACO INTRAOCULAR LENS (IOL) (Right: Eye)    Relevant Problems   ANESTHESIA (within normal limits)      CARDIO (within normal limits)      ENDO (within normal limits)      GI/HEPATIC (within normal limits)      /RENAL (within normal limits)      GYN (within normal limits)      HEMATOLOGY (within normal limits)      MUSCULOSKELETAL   (+) Back pain      NEURO/PSYCH   (+) Anxiety      PULMONARY (within normal limits)        Physical Exam    Airway     Mallampati score: I  TM Distance: >3 FB  Neck ROM: full      Cardiovascular      Dental   No notable dental hx     Pulmonary  Pulmonary exam normal     Neurological  - normal exam    Other Findings        Anesthesia Plan  ASA Score- 2     Anesthesia Type- IV sedation with anesthesia with ASA Monitors.         Additional Monitors:     Airway Plan:            Plan Factors-Exercise tolerance (METS): >4 METS.    Chart reviewed.    Patient summary reviewed.                  Induction- intravenous.    Postoperative Plan- .   Monitoring Plan - Monitoring plan - standard ASA monitoring          Informed Consent- Anesthetic plan and risks discussed with patient.  I personally reviewed this patient with the CRNA. Discussed and agreed on the Anesthesia Plan with the CRNA..      NPO Status:  No vitals data found for the desired time range.

## 2025-05-21 ENCOUNTER — HOSPITAL ENCOUNTER (OUTPATIENT)
Facility: AMBULARY SURGERY CENTER | Age: 60
Setting detail: OUTPATIENT SURGERY
Discharge: HOME | End: 2025-05-21
Attending: OPHTHALMOLOGY | Admitting: OPHTHALMOLOGY
Payer: COMMERCIAL

## 2025-05-21 ENCOUNTER — APPOINTMENT (OUTPATIENT)
Dept: LAB | Facility: AMBULARY SURGERY CENTER | Age: 60
End: 2025-05-21
Payer: COMMERCIAL

## 2025-05-21 ENCOUNTER — ANESTHESIA (OUTPATIENT)
Dept: PERIOP | Facility: AMBULARY SURGERY CENTER | Age: 60
End: 2025-05-21
Payer: COMMERCIAL

## 2025-05-21 VITALS
HEIGHT: 63 IN | HEART RATE: 64 BPM | RESPIRATION RATE: 16 BRPM | WEIGHT: 153 LBS | SYSTOLIC BLOOD PRESSURE: 130 MMHG | TEMPERATURE: 97 F | BODY MASS INDEX: 27.11 KG/M2 | OXYGEN SATURATION: 96 % | DIASTOLIC BLOOD PRESSURE: 82 MMHG

## 2025-05-21 PROCEDURE — NC001 PR NO CHARGE: Performed by: OPHTHALMOLOGY

## 2025-05-21 PROCEDURE — V2787 ASTIGMATISM-CORRECT FUNCTION: HCPCS | Performed by: OPHTHALMOLOGY

## 2025-05-21 PROCEDURE — 66984 XCAPSL CTRC RMVL W/O ECP: CPT | Performed by: OPHTHALMOLOGY

## 2025-05-21 DEVICE — CLAREON BLF TORIC
Type: IMPLANTABLE DEVICE | Site: EYE | Status: FUNCTIONAL
Brand: CLAREON

## 2025-05-21 RX ORDER — LIDOCAINE HYDROCHLORIDE 10 MG/ML
INJECTION, SOLUTION EPIDURAL; INFILTRATION; INTRACAUDAL; PERINEURAL AS NEEDED
Status: DISCONTINUED | OUTPATIENT
Start: 2025-05-21 | End: 2025-05-21 | Stop reason: HOSPADM

## 2025-05-21 RX ORDER — ACETAMINOPHEN 325 MG/1
650 TABLET ORAL EVERY 4 HOURS PRN
Status: CANCELLED | OUTPATIENT
Start: 2025-05-21

## 2025-05-21 RX ORDER — PROPARACAINE HYDROCHLORIDE 5 MG/ML
1 SOLUTION/ DROPS OPHTHALMIC ONCE
Status: COMPLETED | OUTPATIENT
Start: 2025-05-21 | End: 2025-05-21

## 2025-05-21 RX ORDER — SODIUM CHLORIDE, SODIUM LACTATE, POTASSIUM CHLORIDE, CALCIUM CHLORIDE 600; 310; 30; 20 MG/100ML; MG/100ML; MG/100ML; MG/100ML
INJECTION, SOLUTION INTRAVENOUS CONTINUOUS PRN
Status: DISCONTINUED | OUTPATIENT
Start: 2025-05-21 | End: 2025-05-21

## 2025-05-21 RX ORDER — PROPARACAINE HYDROCHLORIDE 5 MG/ML
SOLUTION/ DROPS OPHTHALMIC AS NEEDED
Status: DISCONTINUED | OUTPATIENT
Start: 2025-05-21 | End: 2025-05-21 | Stop reason: HOSPADM

## 2025-05-21 RX ORDER — CYCLOPENTOLATE HYDROCHLORIDE 10 MG/ML
1 SOLUTION/ DROPS OPHTHALMIC
Status: COMPLETED | OUTPATIENT
Start: 2025-05-21 | End: 2025-05-21

## 2025-05-21 RX ORDER — PHENYLEPHRINE HYDROCHLORIDE 25 MG/ML
1 SOLUTION/ DROPS OPHTHALMIC
Status: COMPLETED | OUTPATIENT
Start: 2025-05-21 | End: 2025-05-21

## 2025-05-21 RX ORDER — DEXAMETHASONE SODIUM PHOSPHATE 10 MG/ML
INJECTION, SOLUTION INTRAMUSCULAR; INTRAVENOUS AS NEEDED
Status: DISCONTINUED | OUTPATIENT
Start: 2025-05-21 | End: 2025-05-21 | Stop reason: HOSPADM

## 2025-05-21 RX ORDER — SODIUM CHLORIDE, SODIUM LACTATE, POTASSIUM CHLORIDE, CALCIUM CHLORIDE 600; 310; 30; 20 MG/100ML; MG/100ML; MG/100ML; MG/100ML
20 INJECTION, SOLUTION INTRAVENOUS CONTINUOUS
Status: DISCONTINUED | OUTPATIENT
Start: 2025-05-21 | End: 2025-05-21 | Stop reason: HOSPADM

## 2025-05-21 RX ORDER — MIDAZOLAM HYDROCHLORIDE 2 MG/2ML
INJECTION, SOLUTION INTRAMUSCULAR; INTRAVENOUS AS NEEDED
Status: DISCONTINUED | OUTPATIENT
Start: 2025-05-21 | End: 2025-05-21

## 2025-05-21 RX ORDER — MOXIFLOXACIN 5 MG/ML
SOLUTION/ DROPS OPHTHALMIC AS NEEDED
Status: DISCONTINUED | OUTPATIENT
Start: 2025-05-21 | End: 2025-05-21 | Stop reason: HOSPADM

## 2025-05-21 RX ORDER — FENTANYL CITRATE 50 UG/ML
INJECTION, SOLUTION INTRAMUSCULAR; INTRAVENOUS AS NEEDED
Status: DISCONTINUED | OUTPATIENT
Start: 2025-05-21 | End: 2025-05-21

## 2025-05-21 RX ORDER — BALANCED SALT SOLUTION 6.4; .75; .48; .3; 3.9; 1.7 MG/ML; MG/ML; MG/ML; MG/ML; MG/ML; MG/ML
SOLUTION OPHTHALMIC AS NEEDED
Status: DISCONTINUED | OUTPATIENT
Start: 2025-05-21 | End: 2025-05-21 | Stop reason: HOSPADM

## 2025-05-21 RX ORDER — POVIDONE-IODINE 5 %
SOLUTION, NON-ORAL OPHTHALMIC (EYE) AS NEEDED
Status: DISCONTINUED | OUTPATIENT
Start: 2025-05-21 | End: 2025-05-21 | Stop reason: HOSPADM

## 2025-05-21 RX ORDER — TROPICAMIDE 10 MG/ML
1 SOLUTION/ DROPS OPHTHALMIC
Status: COMPLETED | OUTPATIENT
Start: 2025-05-21 | End: 2025-05-21

## 2025-05-21 RX ADMIN — PHENYLEPHRINE HYDROCHLORIDE 1 DROP: 25 SOLUTION/ DROPS OPHTHALMIC at 07:17

## 2025-05-21 RX ADMIN — CYCLOPENTOLATE HYDROCHLORIDE 1 DROP: 10 SOLUTION/ DROPS OPHTHALMIC at 07:16

## 2025-05-21 RX ADMIN — FENTANYL CITRATE 25 MCG: 50 INJECTION INTRAMUSCULAR; INTRAVENOUS at 08:10

## 2025-05-21 RX ADMIN — PHENYLEPHRINE HYDROCHLORIDE 1 DROP: 25 SOLUTION/ DROPS OPHTHALMIC at 07:22

## 2025-05-21 RX ADMIN — PHENYLEPHRINE HYDROCHLORIDE 1 DROP: 25 SOLUTION/ DROPS OPHTHALMIC at 07:11

## 2025-05-21 RX ADMIN — CYCLOPENTOLATE HYDROCHLORIDE 1 DROP: 10 SOLUTION/ DROPS OPHTHALMIC at 07:11

## 2025-05-21 RX ADMIN — TROPICAMIDE 1 DROP: 10 SOLUTION/ DROPS OPHTHALMIC at 07:11

## 2025-05-21 RX ADMIN — MIDAZOLAM 2 MG: 1 INJECTION INTRAMUSCULAR; INTRAVENOUS at 07:30

## 2025-05-21 RX ADMIN — FENTANYL CITRATE 25 MCG: 50 INJECTION INTRAMUSCULAR; INTRAVENOUS at 08:37

## 2025-05-21 RX ADMIN — CYCLOPENTOLATE HYDROCHLORIDE 1 DROP: 10 SOLUTION/ DROPS OPHTHALMIC at 07:22

## 2025-05-21 RX ADMIN — SODIUM CHLORIDE, SODIUM LACTATE, POTASSIUM CHLORIDE, AND CALCIUM CHLORIDE: .6; .31; .03; .02 INJECTION, SOLUTION INTRAVENOUS at 07:30

## 2025-05-21 RX ADMIN — FENTANYL CITRATE 25 MCG: 50 INJECTION INTRAMUSCULAR; INTRAVENOUS at 08:48

## 2025-05-21 RX ADMIN — FENTANYL CITRATE 25 MCG: 50 INJECTION INTRAMUSCULAR; INTRAVENOUS at 07:30

## 2025-05-21 RX ADMIN — TROPICAMIDE 1 DROP: 10 SOLUTION/ DROPS OPHTHALMIC at 07:17

## 2025-05-21 RX ADMIN — PROPARACAINE HYDROCHLORIDE 1 DROP: 5 SOLUTION/ DROPS OPHTHALMIC at 07:11

## 2025-05-21 RX ADMIN — TROPICAMIDE 1 DROP: 10 SOLUTION/ DROPS OPHTHALMIC at 07:23

## 2025-05-21 NOTE — OP NOTE
OPERATIVE REPORT  PATIENT NAME: Glen Wong    :  1965  MRN: [id number]  Pt Location: [facility] OR ROOM 01    SURGERY DATE: 2025    Surgeons and Role:     * Idris Esteban DO - Primary    Preop Diagnosis:  Combined forms of age-related cataract of right eye [H25.811]  Anterior corneal astigmatism of right eye    Post-Op Diagnosis Codes:     * Combined forms of age-related cataract of right eye [H25.811]  Anterior corneal astigmatism of right eye    Procedure(s):  Right - EXTRACTION EXTRACAPSULAR CATARACT PHACO INTRAOCULAR LENS (IOL)    Specimen(s):  * No specimens in log *    Estimated Blood Loss:   none    Drains:  * No LDAs found *    Anesthesia Type:   IV Sedation with Anesthesia    Operative Indications:  Combined forms of age-related cataract of right eye [H25.811]  Anterior corneal astigmatism of right eye    Operative Findings:  Combined forms of age-related cataract of right eye [H25.811]  Anterior corneal astigmatism of right eye       Complications:   None    Procedure and Technique:  Phaco-toric pciol od   I was present for the entire procedure.    Patient Disposition:  PACU       Location:  [facility]   Service:    Ophthalmology      Date of Operation: 25      Pre-op Diagnosis: 1)Combined Senile Cataract of the right eye   2)Corneal Astigmatism of the right eye      Post-op Diagnosis: 1)Combined Senile Cataract of the right eye   2)Corneal Astigmatism of the right eye      Surgeon: Idris Esteban DO      Assistants: None      Anesthesia: Monitored Local Anesthesia with Sedation      Operation: Phacoemulsification cataract extraction with Toric PCIOL implantation, OD      Findings: Cataract and corneal astigmatism OD   Specimens and Disposition: None   Complications:  None   Indications and History:   Decreased vision due to Combined forms of age related cataract right eye (H25.011)      Description of Operation:      The patient was brought to the preoperative holding area  where the operative eye was confirmed both verbally and on the consent form. A venkat was placed on the skin above the right  eye to designate that eye as the correct surgical site. An IV line was placed by the anesthesia staff. Pharmacologic dilating drops were applied to the eye.  The patient was then seated upright and the 180 degree axis of the cornea was marked using a robomarker.  The patient was brought to the operating room where the anesthesiologist established cardiac monitoring and light IV sedation was administered.  The patient was appropriately positioned supine on an eye bed.  Two drops of Proparacaine were then applied and the eye was then prepped and draped in the usual sterile fashion for intraocular surgery.  A surgical time-out was performed, confirming the patient’s identity by name, and date of birth and confirming the correct eye and procedure to be performed. We confirmed that the appropriate IOL implant was present in the room.   An eye lid speculum was then applied for exposure of the right globe.  Sterile preservative-free tetracaine was applied to the globe. A paracentesis port was created with a sideport blade at the 11:30 limbus.   A small amount of  preservative-free 1% lidocaine  was injected into the anterior chamber.  The anterior chamber was then gently filled with Viscoat. A temporal clear cornea incision was then created with a 2.4 mm keratome in a tunneled fashion.  A continuous curvilinear capsulorrhexis (CCC) was initiated with a cystitome and completed with Utrata forceps.  The CCC was nicely centered on the anterior capsule and measured approximately 5.0mm.  Copious but gentle hydrodissection was performed, allowing the nucleus to rotate freely within the capsular bag.   Additional Viscoat was placed in the anterior chamber.  The nucleus was disassembled using a stop and chop technique and the nuclear quadrants  were emulsifed without incident.  The anterior chamber was kept  formed during instrument exchange by injecting Provisc into the anterior chamber.  The residual cortical material was then removed with the irrigation and aspiration handpiece.   Provisc was injected into the capsular bag and anterior chamber to add adequate volume to each.   The undersurface of the anterior capsule was polished using a rosado sweep for 360 degrees.  An intraocular lens from [employer], model [id number], 22.0 diopters in power, selected after review and interpretation of IOL measurements , was then injected into the capsular bag through the clear corneal incision using the D cartridge on the [place] 3 injector.  The IOL centered nicely within the capsular bag. The IOL was rotated to the 10 degree axis of the cornea.  The residual viscoelastic material was evacuated from the capsular bag and anterior chamber via the irrigation and aspiration handpiece while the elijah spatula was used to prevent the IOL from rotating.  The I/A handpiece was then used as an AC maintainer while the elijah spatula was used to gently rotate the PCIOL so that the toric marks on the optic were aligned with the intended axis of 180 degrees.     Gentle stromal hydration was then performed at each wound site.          The anterior chamber was well-formed and maintained and the IOP was palpated and found to be at approximately 20 mmHg.         All incisions were dried and virgil-tested negative.  The anterior chamber remained well-formed and well-maintained with the IOL very nicely centered within the capsular bag, with approximately 1.0 mm of overlap of the anterior capsular leaflet to the peripheral aspect of the IOL optic for 360 degrees.   The IOL was aligned with the 180 degree axis of the cornea as intended.       An inferonasal subconjunctival injection of Dexamethasone 2mg was then delivered. Two drops of moxifloxacin were applied to the globe.      The eyelid speculum and drapes were carefully removed and the periorbital  area was wiped with a sterile, moist sponge followed by a sterile, dry sponge.         The eye was then dressed with a two drops of moxifloxacin and a casas shield was taped in place over the globe.   The patient tolerated the procedure well and was taken to the recovery room in excellent condition.  I discussed proper care of the eye following surgery.  Typed instructions were provided as well. The patient was provided my cell phone number to contact me should any questions or problems arise and a follow-up appointment was set for the following morning.                                        SIGNATURE: Idris Esteban,   DATE: May 21, 2025  TIME: 7:09 PM

## 2025-05-21 NOTE — ANESTHESIA POSTPROCEDURE EVALUATION
Post-Op Assessment Note    CV Status:  Stable    Pain management: adequate       Mental Status:  Alert and awake   Hydration Status:  Stable   PONV Controlled:  None   Airway Patency:  Patent     Post Op Vitals Reviewed: Yes    No anethesia notable event occurred.    Staff: CRNA           Last Filed PACU Vitals:  Vitals Value Taken Time   Temp     Pulse     BP     Resp     SpO2

## 2025-05-21 NOTE — ANESTHESIA POSTPROCEDURE EVALUATION
Post-Op Assessment Note    CV Status:  Stable    Pain management: adequate       Mental Status:  Alert and awake   Hydration Status:  Euvolemic   PONV Controlled:  Controlled   Airway Patency:  Patent     Post Op Vitals Reviewed: Yes    No anethesia notable event occurred.    Staff: Anesthesiologist, with CRNAs           Last Filed PACU Vitals:  Vitals Value Taken Time   Temp 97.5 °F (36.4 °C) 05/21/25 08:53   Pulse 72 05/21/25 09:12   /80 05/21/25 09:11   Resp 17 05/21/25 09:12   SpO2 99 % 05/21/25 09:12   Vitals shown include unfiled device data.    Modified Audi:     Vitals Value Taken Time   Activity 2 05/21/25 09:10   Respiration 2 05/21/25 09:10   Circulation 2 05/21/25 09:10   Consciousness 2 05/21/25 09:10   Oxygen Saturation 2 05/21/25 09:10     Modified Audi Score: 10

## 2025-05-23 ENCOUNTER — OFFICE VISIT (OUTPATIENT)
Age: 60
End: 2025-05-23

## 2025-05-23 DIAGNOSIS — Z09 POSTOP CHECK: Primary | ICD-10-CM

## 2025-05-23 DIAGNOSIS — H25.813 COMBINED FORM OF SENILE CATARACT OF BOTH EYES: ICD-10-CM

## 2025-05-23 RX ORDER — PREDNISOLONE ACETATE 10 MG/ML
SUSPENSION/ DROPS OPHTHALMIC
Qty: 5 ML | Refills: 4 | Status: SHIPPED | OUTPATIENT
Start: 2025-05-23

## 2025-05-23 RX ORDER — MOXIFLOXACIN 5 MG/ML
SOLUTION/ DROPS OPHTHALMIC
Qty: 3 ML | Refills: 4 | Status: SHIPPED | OUTPATIENT
Start: 2025-05-23

## 2025-05-23 NOTE — PROGRESS NOTES
Name: Glen Wong      : 1965      MRN: [id number]  Encounter Provider: Idris Esteban DO  Encounter Date: 2025   Encounter department: [facility] OPHTHALMOLOGY  :  Assessment & Plan  Combined form of senile cataract of both eyes  -see below  Orders:  •  moxifloxacin (VIGAMOX) 0.5 % ophthalmic solution; Instill 1 drop in right eye 4 times a day after your eye procedure  •  prednisoLONE acetate (PRED FORTE) 1 % ophthalmic suspension; Instill 1 drop in left eye 6 times/day and 1 drop in right eye 4 times/day (shake well)    Postop check    OD:  Pod2 s/p phaco-toric pciol  Missed appt yd  All appears ok, suspect toric iol is well aligned though view of IOL marks is difficult with small pupil    OS: Pod15  s/p phaco-pciol  -less of an ac rxn today than 1 wk ago     Meds:  Moxi od q4h wa  Pred OU q4h wa  Ma shield od qhs   Return in about 1 week (around 2025) for for POW 1 visit with autorx/ k ou.                 Glen Wong is a 59 y.o. male who presents Today for Post op visit. Pt reports improvement in vision OU, Denies pain OU   History obtained from: patient    Review of Systems  Medical History Reviewed by provider this encounter:       HPI     Post-op     Additional comments: Today for Post op visit. Pt reports improvement in vision OU, Denies pain OU            Comments    OD:  Pod2 s/p phaco-toric pciol  All seems ok  Missed appt yd    OS: Pod15  s/p phaco-pciol  -had +2 ac rxn (fine, mostly pigm cell) - asymp at      Meds:  Moxi od q4h wa  Pred od q4h wa  Pred os 6 times per day  Ma shield od qhs           Last edited by Idris Esteban DO on 2025  6:37 PM.               Base Eye Exam     Visual Acuity (Snellen - Linear)       Right Left    Dist sc 20/20 20/20          Tonometry (Applanation, 1000)       Right Left    Pressure 20           Pupils       Pupils Dark APD    Right PERRL 3 -    Left PERRL 3 -          Visual Fields       Left Right     Full Full           Extraocular Movement       Right Left     Full, Ortho Full, Ortho          Neuro/Psych     Oriented x3: Yes            Slit Lamp and Fundus Exam     External Exam       Right Left    External  Normal          Slit Lamp Exam       Right Left    Lids/Lashes Normal Normal    Conjunctiva/Sclera White and quiet White and quiet    Cornea +1 temp cci mce; no dmf; mechelle neg +1 endo pigm    Anterior Chamber +1 cell mild cell    Iris Round and reactive Round and reactive/no tid    Lens Toric PCIOL (aligned at ~177) PCIOL - clear    Anterior Vitreous clear clear          Fundus Exam       Right Left    Disc  sharp, no pallor    C/D Ratio  0.0                  IMAGING:

## (undated) DEVICE — Device

## (undated) DEVICE — SOLUTION BOWL: Brand: KENDALL

## (undated) DEVICE — DECANTER: Brand: UNBRANDED

## (undated) DEVICE — NEEDLE 30 G X 1/2

## (undated) DEVICE — POV-IOD SOLUTION 4OZ BT

## (undated) DEVICE — STRL UNIVERSAL MINOR GENERAL: Brand: CARDINAL HEALTH

## (undated) DEVICE — LIGHT HANDLE COVER SLEEVE DISP BLUE STELLAR

## (undated) DEVICE — NEEDLE 25G X 1 1/2

## (undated) DEVICE — CLEARCUT® HP2 SLIT KNIFE INTREPID MICRO-COAXIAL SYSTEM 2.4 DB: Brand: CLEARCUT®; INTREPID

## (undated) DEVICE — UTILITY MARKER,BLACK WITH LABELS: Brand: DEVON

## (undated) DEVICE — SYRINGE 3ML LL

## (undated) DEVICE — GLOVE INDICATOR PI UNDERGLOVE SZ 7.5 BLUE

## (undated) DEVICE — DISPOSABLE OR TOWEL: Brand: CARDINAL HEALTH

## (undated) DEVICE — CLEARCUT® SIDEPORT KNIFE DUAL BEVEL 1.0MM ANGLED: Brand: CLEARCUT®

## (undated) DEVICE — COTTON TIP APPLICTOR 2 PK

## (undated) DEVICE — PREMIUM DRY TRAY LF: Brand: MEDLINE INDUSTRIES, INC.

## (undated) DEVICE — GLOVE SRG LF STRL BGL SKNSNS 7.5 PF

## (undated) DEVICE — MICROSURGICAL INSTRUMENT HYDRODISSECTION CANNULA 25GA, 8MM BEND: Brand: ALCON

## (undated) DEVICE — SCD SEQUENTIAL COMPRESSION COMFORT SLEEVE MEDIUM KNEE LENGTH: Brand: KENDALL SCD

## (undated) DEVICE — REM POLYHESIVE ADULT PATIENT RETURN ELECTRODE: Brand: VALLEYLAB

## (undated) DEVICE — POOLE SUCTION HANDLE: Brand: CARDINAL HEALTH

## (undated) DEVICE — CHLORAPREP HI-LITE 26ML ORANGE

## (undated) DEVICE — 3M™ STERI-STRIP™ REINFORCED ADHESIVE SKIN CLOSURES, R1547, 1/2 IN X 4 IN (12 MM X 100 MM), 6 STRIPS/ENVELOPE: Brand: 3M™ STERI-STRIP™

## (undated) DEVICE — SPECIMEN CONTAINER STERILE PEEL PACK

## (undated) DEVICE — GLOVE SRG BIOGEL ECLIPSE 7.5

## (undated) DEVICE — MICROSURGICAL INSTRUMENT ANTERIOR CHAMBER CANNULA 27GA: Brand: ALCON

## (undated) DEVICE — COTTON TIP APPLICATOR 12PK 3IN

## (undated) DEVICE — THE MONARCH® "D" CARTRIDGE IS A SINGLE-USE POLYPROPYLENE CARTRIDGE FOR POSTERIOR CHAMBER IOL DELIVERY: Brand: MONARCH® III

## (undated) DEVICE — ACTIVE FMS W/ INTREPID* ULTRA SLEEVES, 0.9MM 45° ABS* INTREPID* BALANCED TIP: Brand: ALCON

## (undated) DEVICE — INTENDED FOR TISSUE SEPARATION, AND OTHER PROCEDURES THAT REQUIRE A SHARP SURGICAL BLADE TO PUNCTURE OR CUT.: Brand: BARD-PARKER SAFETY BLADES SIZE 15, STERILE

## (undated) DEVICE — MICROSURGICAL INSTRUMENT IRR CYSTITOME 25GA REVERSE CUT DEEP-SET EYES: Brand: ALCON

## (undated) DEVICE — 3M™ STERI-STRIP™ REINFORCED ADHESIVE SKIN CLOSURES, R1546, 1/4 IN X 4 IN (6 MM X 100 MM), 10 STRIPS/ENVELOPE: Brand: 3M™ STERI-STRIP™

## (undated) DEVICE — GAUZE SPONGES,USP TYPE VII GAUZE, 12 PLY: Brand: CURITY

## (undated) DEVICE — 3M™ TEGADERM™ TRANSPARENT FILM DRESSING FRAME STYLE, 1626W, 4 IN X 4-3/4 IN (10 CM X 12 CM), 50/CT 4CT/CASE: Brand: 3M™ TEGADERM™